# Patient Record
Sex: MALE | Race: WHITE | Employment: PART TIME | URBAN - METROPOLITAN AREA
[De-identification: names, ages, dates, MRNs, and addresses within clinical notes are randomized per-mention and may not be internally consistent; named-entity substitution may affect disease eponyms.]

---

## 2018-10-26 LAB
ALBUMIN SERPL-MCNC: NORMAL G/DL
ALP BLD-CCNC: NORMAL U/L
ALT SERPL-CCNC: NORMAL U/L
ANION GAP SERPL CALCULATED.3IONS-SCNC: NORMAL MMOL/L
AST SERPL-CCNC: NORMAL U/L
BILIRUB SERPL-MCNC: NORMAL MG/DL (ref 0.1–1.4)
BUN BLDV-MCNC: NORMAL MG/DL
CALCIUM SERPL-MCNC: NORMAL MG/DL
CHLORIDE BLD-SCNC: NORMAL MMOL/L
CO2: NORMAL MMOL/L
CREAT SERPL-MCNC: 1 MG/DL
GFR CALCULATED: NORMAL
GLUCOSE BLD-MCNC: NORMAL MG/DL
POTASSIUM SERPL-SCNC: 4.8 MMOL/L
SODIUM BLD-SCNC: NORMAL MMOL/L
TOTAL PROTEIN: NORMAL

## 2018-10-27 LAB
CHOLESTEROL, TOTAL: 221 MG/DL
CHOLESTEROL/HDL RATIO: NORMAL
HDLC SERPL-MCNC: 60 MG/DL (ref 35–70)
LDL CHOLESTEROL CALCULATED: 145 MG/DL (ref 0–160)
TRIGL SERPL-MCNC: 81 MG/DL
VLDLC SERPL CALC-MCNC: 16 MG/DL

## 2019-04-30 LAB — TESTOSTERONE TOTAL: 335 NG/DL (ref 220–1000)

## 2019-05-03 ENCOUNTER — TELEPHONE (OUTPATIENT)
Dept: FAMILY MEDICINE CLINIC | Age: 69
End: 2019-05-03

## 2019-05-03 DIAGNOSIS — E29.1 HYPOGONADISM IN MALE: ICD-10-CM

## 2019-05-03 DIAGNOSIS — E34.9 TESTOSTERONE DEFICIENCY: Primary | ICD-10-CM

## 2019-05-03 RX ORDER — TESTOSTERONE CYPIONATE 200 MG/ML
200 VIAL (ML) INTRAMUSCULAR
Qty: 2 ML | Refills: 1 | OUTPATIENT
Start: 2019-05-03 | End: 2019-06-05

## 2019-05-03 NOTE — TELEPHONE ENCOUNTER
----- Message from Clotilde Lewis MD sent at 5/1/2019 11:22 AM EDT -----  Please call pt-his T level is low nml. We can try T supplementation to see if it helps him feel better. P: If he'd like-would try injectable testosterone cypionate 200 mg IM q 2 weeks and thne recheck T level in 4-6 weeks. Thxs. PATIENT INFORMED OF LAB PER DR. Nicolle Burks.  VOICE UNDERSTANDING. WOULD LIKE TO TRY TESTOSTERONE INJECTION. ADVISED THAT IN SOME CASES, INSURANCE WILL REJECT RX AND IT MAY REQUIRE A PRIOR AUTHORIZATION. THIS NURSE WILL BE WATCHING FOR THIS. IF THEY COVER IT, PATIENT CAN  FROM PHARMACY AND SCHEDULE A NURSE VISIT FOR US TO ADMINISTER. WILL NEED TO PUT IN ORDER FOR LAB DRAW THEN. PATIENT VOICED UNDERSTANDING. CALLED TESTOSTERONE TO [facility] AT 9:48.

## 2019-05-08 DIAGNOSIS — R79.89 LOW TESTOSTERONE: Primary | ICD-10-CM

## 2019-05-08 NOTE — TELEPHONE ENCOUNTER
INFORMED PATIENT THAT A PRIOR AUTH FOR TESTOSTERONE CYPIONATE WAS DONE AND THE REJECTION MESSAGE STATED THAT HE NEEDS A SECOND TESTOSTERONE LEVEL DRAWN. PATIENT INFORMED AND ORDER FOR TESTOSTERONE LEVEL ENTERED FOR NEXT WEEK.

## 2019-05-08 NOTE — TELEPHONE ENCOUNTER
PLEASE SIGN LAB ORDER. PER INSURANCE, REQUIRES SECOND DRAW FOR INSURANCE PURPOSES BEFORE THEY WILL IMPROVE INJECTIONS.

## 2019-05-10 RX ORDER — LISINOPRIL AND HYDROCHLOROTHIAZIDE 20; 12.5 MG/1; MG/1
TABLET ORAL
Qty: 30 TABLET | Refills: 5 | Status: SHIPPED | OUTPATIENT
Start: 2019-05-10 | End: 2019-11-13

## 2019-05-15 ENCOUNTER — OFFICE VISIT (OUTPATIENT)
Dept: FAMILY MEDICINE CLINIC | Age: 69
End: 2019-05-15
Payer: COMMERCIAL

## 2019-05-15 VITALS
HEART RATE: 65 BPM | HEIGHT: 68 IN | TEMPERATURE: 97.8 F | WEIGHT: 170.4 LBS | OXYGEN SATURATION: 97 % | SYSTOLIC BLOOD PRESSURE: 118 MMHG | BODY MASS INDEX: 25.82 KG/M2 | DIASTOLIC BLOOD PRESSURE: 68 MMHG

## 2019-05-15 DIAGNOSIS — L25.8 CONTACT DERMATITIS DUE TO OTHER AGENT, UNSPECIFIED CONTACT DERMATITIS TYPE: Primary | ICD-10-CM

## 2019-05-15 DIAGNOSIS — E29.1 TESTICULAR HYPOFUNCTION: ICD-10-CM

## 2019-05-15 DIAGNOSIS — I10 ESSENTIAL HYPERTENSION: ICD-10-CM

## 2019-05-15 DIAGNOSIS — E29.1 HYPOGONADISM IN MALE: ICD-10-CM

## 2019-05-15 DIAGNOSIS — R79.89 LOW TESTOSTERONE: ICD-10-CM

## 2019-05-15 DIAGNOSIS — E29.1 TESTICULAR HYPOFUNCTION: Primary | ICD-10-CM

## 2019-05-15 DIAGNOSIS — E34.9 TESTOSTERONE DEFICIENCY: ICD-10-CM

## 2019-05-15 PROCEDURE — 99213 OFFICE O/P EST LOW 20 MIN: CPT | Performed by: FAMILY MEDICINE

## 2019-05-15 RX ORDER — LISINOPRIL 30 MG/1
30 TABLET ORAL DAILY
Qty: 30 TABLET | Refills: 0 | Status: SHIPPED | OUTPATIENT
Start: 2019-05-15 | End: 2019-06-07 | Stop reason: SDUPTHER

## 2019-05-15 RX ORDER — PREDNISONE 1 MG/1
TABLET ORAL
Qty: 15 TABLET | Refills: 0 | Status: SHIPPED | OUTPATIENT
Start: 2019-05-15 | End: 2019-05-31

## 2019-05-15 RX ORDER — LISINOPRIL AND HYDROCHLOROTHIAZIDE 20; 12.5 MG/1; MG/1
TABLET ORAL
Refills: 5 | COMMUNITY
Start: 2019-05-10 | End: 2019-05-15 | Stop reason: SINTOL

## 2019-05-15 ASSESSMENT — PATIENT HEALTH QUESTIONNAIRE - PHQ9
SUM OF ALL RESPONSES TO PHQ QUESTIONS 1-9: 0
2. FEELING DOWN, DEPRESSED OR HOPELESS: 0
SUM OF ALL RESPONSES TO PHQ9 QUESTIONS 1 & 2: 0
1. LITTLE INTEREST OR PLEASURE IN DOING THINGS: 0
SUM OF ALL RESPONSES TO PHQ QUESTIONS 1-9: 0

## 2019-05-15 ASSESSMENT — ENCOUNTER SYMPTOMS
COUGH: 0
SHORTNESS OF BREATH: 0

## 2019-05-15 NOTE — PROGRESS NOTES
5/15/2019     Audrey [place] Bobbi Daley is a 70 y.o. male who presents today with:  Chief Complaint   Patient presents with    Other     rash bilat lower extremeties x 2 weeks , red raised and itching, pt has tried gold bond powder which did help with the itching. no med refills per pt current pharm [facility]    . /68 (Position: Sitting, Cuff Size: Medium Adult)   Pulse 65   Temp 97.8 °F (36.6 °C) (Temporal)   Ht 5' 7.5\" (1.715 m)   Wt 170 lb 6.4 oz (77.3 kg)   SpO2 97%   BMI 26.29 kg/m²     HPI  History was obtained from the pt. he reports that he developed a rash about 3 weeks ago right after he had been in the office to see Dr. Bala Hoskins for routine follow-up. He states that the rash involved in her thighs of both legs, and his lower extremities. Reports that the rash both itches and burns at different times. Has used hydrocortisone cream and states that it made the burning worse. He has applied Gold-bond eczema powder works best to help alleviate his symptoms. He states that the rash is nowhere else on his body including his trunk and bilateral upper extremities chest and face. He denies use of any new soaps, lotions, detergents or creams. Did have his medication change rather than his previous office visit and was started on hydrochlorothiazide. Did speak with Dr. Jazz Glasgow M.D. about this patient and he believes that the patient may have developed photosensitivity to be due to the hydrochlorothiazide. REVIEW OF SYMPTOMS    Review of Systems   Constitutional: Negative for fatigue. Respiratory: Negative for cough and shortness of breath. Cardiovascular: Negative for chest pain, palpitations and leg swelling. Skin: Positive for rash (BLE - inner thighs). Neurological: Negative for dizziness, light-headedness and headaches. PAST MEDICAL HISTORY  No past medical history on file. FAMILY HISTORY  No family history on file.     SOCIAL HISTORY  Social History Socioeconomic History    Marital status:      Spouse name: None    Number of children: None    Years of education: None    Highest education level: None   Occupational History    None   Social Needs    Financial resource strain: None    Food insecurity:     Worry: None     Inability: None    Transportation needs:     Medical: None     Non-medical: None   Tobacco Use    Smoking status: Current Every Day Smoker     Packs/day: 0.50     Years: 15.00     Pack years: 7.50     Types: Cigars     Start date: 5/15/2005    Smokeless tobacco: Never Used   Substance and Sexual Activity    Alcohol use: Yes    Drug use: No    Sexual activity: None   Lifestyle    Physical activity:     Days per week: None     Minutes per session: None    Stress: None   Relationships    Social connections:     Talks on phone: None     Gets together: None     Attends Mu-ism service: None     Active member of club or organization: None     Attends meetings of clubs or organizations: None     Relationship status: None    Intimate partner violence:     Fear of current or ex partner: None     Emotionally abused: None     Physically abused: None     Forced sexual activity: None   Other Topics Concern    None   Social History Narrative    None        SURGICAL HISTORY  No past surgical history on file. CURRENT MEDICATIONS  Current Outpatient Medications   Medication Sig Dispense Refill    lisinopril (PRINIVIL;ZESTRIL) 30 MG tablet Take 1 tablet by mouth daily 30 tablet 0    predniSONE (DELTASONE) 5 MG tablet Take 5 tabs x1 day, then 4 tabs x 1 day, then 3 tabs x 1 day, then 2 tabs x 1 day, then 1 tab x 1 day 15 tablet 0    Testosterone Cypionate 200 MG/ML SOLN Inject 200 mg as directed every 14 days 2 mL 1     No current facility-administered medications for this visit. ALLERGIES  No Known Allergies    PHYSICAL EXAM    Physical Exam   Constitutional: He is oriented to person, place, and time.  He appears current POC for the assessment and plan dictated above. Electronically signed by TED Merida on 5/15/2019      Please note that this chart was generated using dragon dictation software. Although every effort was made to ensure the accuracy of this automated transcription, some errors in transcription may have occurred.

## 2019-05-15 NOTE — PATIENT INSTRUCTIONS
Take medications as prescribed. Stop taking lisinopril/hydrochlorothiazide and take only the lisinopril 30 mg, 1 tablet by mouth daily. Take the prednisone taper as prescribed. Contact this office should you have worsening of symptoms, or your symptoms do not resolve. Follow up in 2 weeks for rash and a blood pressure check. May use Lubriderm, Cera-Ve, or other lotions to keep skin moisturized. Patient Education        Oral Corticosteroids: Care Instructions  Your Care Instructions    Oral corticosteroids are commonly used medicines. They help calm down the body's response to inflammation. Oral means that they are taken by mouth. This is most often in the form of a pill. They are used for treating many conditions. You may take them for asthma, COPD, back pain, or allergic reactions. They are also used for other conditions such as autoimmune diseases and certain types of cancer. You may have side effects from taking this medicine. These include nausea, headache, dizziness, and anxiety. Pregnant women should not take this medicine unless their doctor tells them to. Follow your doctor's instructions on how to take this medicine. If you are taking it for 2 weeks or more, your doctor may give you special instructions to slowly reduce (taper) the amount you take. Slowly cutting down on the medicine over time helps your body adjust to the change. Follow-up care is a key part of your treatment and safety. Be sure to make and go to all appointments, and call your doctor if you are having problems. It's also a good idea to know your test results and keep a list of the medicines you take. How can you care for yourself at home? · Be safe with medicines. Take your medicines exactly as prescribed. Call your doctor if you think you are having a problem with your medicine. You will get more details on the specific medicines your doctor prescribes.   · Take your medicine after a meal. It may cause nausea if you take it on an empty stomach. · Avoid taking nonsteroidal anti-inflammatory drugs (NSAIDs) while you are taking oral corticosteroids. Taking both of these medicines might cause an upset stomach. NSAIDs include ibuprofen (Advil, Motrin) and naproxen (Aleve). · If you have a history of stomach ulcers, you may want to avoid taking this medicine and an NSAID at the same time. This can cause stomach upset or bleeding. · Follow your doctor's instructions for how to stop taking this medicine. You may need to taper it. This means the medicine should be slowly reduced. Do not stop taking the medicine all at once. When should you call for help? Call 911 if:    · You vomit blood or what looks like coffee grounds.    Call your doctor now or seek immediate medical care if:    · Your symptoms are getting worse.     · You are dizzy or lightheaded, or you feel like you may faint.     · You have new or worse nausea or vomiting.     · You have stomach pain that is getting worse.     · Your stools are black.    Watch closely for changes in your health, and be sure to contact your doctor if:    · You do not get better as expected. Where can you learn more? Go to https://Fifty100.Simple-Fill. org and sign in to your [employer] account. Enter C708 in the PolicyBazaar box to learn more about \"Oral Corticosteroids: Care Instructions. \"     If you do not have an account, please click on the \"Sign Up Now\" link. Current as of: September 5, 2018  Content Version: 12.0  © 1510-8173 Health[employer], [employer]orporated. Care instructions adapted under license by [facility] ([facility]). If you have questions about a medical condition or this instruction, always ask your healthcare professional. [place] [address] any warranty or liability for your use of this information.          Patient Education        prednisone  Pronunciation:  PRED mirta duke  Brand:  Steve, Sterapred, Sterapred 12 DAY, Sterapred DS, Sterapred DS 12 DAY  What is the most important information I should know about prednisone? Prednisone treats many different conditions such as allergic disorders, skin conditions, ulcerative colitis, arthritis, lupus, psoriasis, or breathing disorders. You should not take prednisone if you have a fungal infection anywhere in your body. Steroid medication can weaken your immune system, making it easier for you to get an infection. Avoid being near people who are sick or have infections. Do not receive a \"live\" vaccine while using prednisone. Call your doctor at once if you have shortness of breath, severe pain in your upper stomach, bloody or tarry stools, severe depression, changes in personality or behavior, vision problems, or eye pain. You should not stop using prednisone suddenly. Follow your doctor's instructions about tapering your dose. What is prednisone? Prednisone is a steroid. Prednisone prevents the release of substances in the body that cause inflammation. Prednisone also suppresses the immune system. Prednisone is used as an anti-inflammatory or an immunosuppressant medication. Prednisone treats many different conditions such as allergic disorders, skin conditions, ulcerative colitis, arthritis, lupus, psoriasis, or breathing disorders. Prednisone may also be used for purposes not listed in this medication guide. What should I discuss with my healthcare provider before taking prednisone? You should not use this medication if you are allergic to prednisone, or if you have a fungal infection anywhere in your body. Steroid medication can weaken your immune system, making it easier for you to get an infection or worsening an infection you already have or have recently had. Tell your doctor about any illness or infection you have had within the past several weeks.   To make sure prednisone is safe for you, tell your doctor if you have:  · any illness that causes diarrhea;  · liver disease (such as cirrhosis);  · kidney disease;  · heart disease, high blood pressure, low levels of potassium in your blood;  · a thyroid disorder;  · diabetes;  · a history of malaria;  · tuberculosis;  · osteoporosis;  · glaucoma, cataracts, or herpes infection of the eyes;  · stomach ulcers, ulcerative colitis, or a history of stomach bleeding;  · a muscle disorder such as myasthenia gravis; or  · depression or mental illness. Long-term use of steroids may lead to bone loss (osteoporosis), especially if you smoke, if you do not exercise, if you do not get enough vitamin D or calcium in your diet, or if you have a family history of osteoporosis. Talk with your doctor about your risk of osteoporosis. Prednisone can cause low birth weight or birth defects if you take the medicine during your first trimester. Tell your doctor if you are pregnant or plan to become pregnant while using this medication. Use effective birth control. Prednisone can pass into breast milk and may harm a nursing baby. Tell your doctor if you are breast-feeding a baby. Steroids can affect growth in children. Talk with your doctor if you think your child is not growing at a normal rate while using this medication. How should I take prednisone? Follow all directions on your prescription label. Your doctor may occasionally change your dose to make sure you get the best results. Do not take this medicine in larger or smaller amounts or for longer than recommended. Take with food. Your dosage needs may change if you have any unusual stress such as a serious illness, fever or infection, or if you have surgery or a medical emergency. Do not change your medication dose or schedule without your doctor's advice. Measure liquid medicine with a special dose-measuring spoon or medicine cup. If you do not have a dose-measuring device, ask your pharmacist for one. Do not crush, chew, or break a delayed-release tablet. Swallow it whole.   While using prednisone, you may need frequent blood tests at your doctor's office. Your blood pressure may also need to be checked. This medication can cause unusual results with certain medical tests. Tell any doctor who treats you that you are using prednisone. You should not stop using prednisone suddenly. Follow your doctor's instructions about tapering your dose. Wear a medical alert tag or carry an ID card stating that you take prednisone. Any medical care provider who treats you should know that you are using a steroid. Store at room temperature away from moisture and heat. What happens if I miss a dose? Take the missed dose as soon as you remember. Skip the missed dose if it is almost time for your next scheduled dose. Do not take extra medicine to make up the missed dose. What happens if I overdose? Seek emergency medical attention or call the Poison Help line at 1-727.994.4580. An overdose of prednisone is not expected to produce life threatening symptoms. However, long term use of high steroid doses can lead to symptoms such as thinning skin, easy bruising, changes in the shape or location of body fat (especially in your face, neck, back, and waist), increased acne or facial hair, menstrual problems, impotence, or loss of interest in sex. What should I avoid while taking prednisone? Avoid being near people who are sick or have infections. Call your doctor for preventive treatment if you are exposed to chicken pox or measles. These conditions can be serious or even fatal in people who are using a steroid. Do not receive a \"live\" vaccine while using prednisone. Prednisone may increase your risk of harmful effects from a live vaccine. Live vaccines include measles, mumps, rubella (MMR), rotavirus, typhoid, yellow fever, varicella (chickenpox), zoster (shingles), and nasal flu (influenza) vaccine. Avoid drinking alcohol while you are taking prednisone. What are the possible side effects of prednisone?   Get emergency medical help if you have any of these signs of an allergic reaction: hives; difficult breathing; swelling of your face, lips, tongue, or throat. Call your doctor at once if you have:  · blurred vision, eye pain, or seeing halos around lights;  · swelling, rapid weight gain, feeling short of breath;  · severe depression, feelings of extreme happiness or sadness, changes in personality or behavior, seizure (convulsions);  · bloody or tarry stools, coughing up blood;  · pancreatitis (severe pain in your upper stomach spreading to your back, nausea and vomiting, fast heart rate);  · low potassium (confusion, uneven heart rate, extreme thirst, increased urination, leg discomfort, muscle weakness or limp feeling); or  · dangerously high blood pressure (severe headache, blurred vision, buzzing in your ears, anxiety, confusion, chest pain, shortness of breath, uneven heartbeats, seizure). Other common side effects may include:  · sleep problems (insomnia), mood changes;  · increased appetite, gradual weight gain;  · acne, increased sweating, dry skin, thinning skin, bruising or discoloration;  · slow wound healing;  · headache, dizziness, spinning sensation;  · nausea, stomach pain, bloating; or  · changes in the shape or location of body fat (especially in your arms, legs, face, neck, breasts, and waist). This is not a complete list of side effects and others may occur. Call your doctor for medical advice about side effects. You may report side effects to FDA at 3-865-FDA-8559. What other drugs will affect prednisone? Many drugs can interact with prednisone. Not all possible interactions are listed here.  Tell your doctor about all your medications and any you start or stop using during treatment with prednisone, especially:  · amphotericin B;  · cyclosporine;  · digoxin, digitalis;  · [place]'s wort;  · an antibiotic such as clarithromycin or telithromycin;  · antifungal medication such as itraconazole, ketoconazole, posaconazole, voriconazole;  · birth control pills and other hormones;  · a blood thinner such as warfarin, Coumadin;  · a diuretic or \"water pill\";  · the hepatitis C medications boceprevir or telaprevir;  · HIV or AIDS medicine such as atazanavir, delavirdine, efavirenz, fosamprenavir, indinavir, nelfinavir, nevirapine, ritonavir, saquinavir;  · insulin or diabetes medications you take by mouth;  · a non-steroidal anti-inflammatory drug (NSAID) such as aspirin, ibuprofen (Advil, Motrin), naproxen (Aleve), celecoxib, diclofenac, indomethacin, meloxicam, and others;  · seizure medications such as carbamazepine, fosphenytoin, oxcarbazepine, phenobarbital, phenytoin, primidone; or  · the tuberculosis medications isoniazid, rifabutin, rifapentine, or rifampin. This list is not complete and many other drugs can interact with prednisone. This includes prescription and over-the-counter medicines, vitamins, and herbal products. Give a list of all your medicines to any healthcare provider who treats you. Where can I get more information? Your pharmacist can provide more information about prednisone. Remember, keep this and all other medicines out of the reach of children, never share your medicines with others, and use this medication only for the indication prescribed. Every effort has been made to ensure that the information provided by [address]iana Cordon [address] is accurate, up-to-date, and complete, but no guarantee is made to that effect. Drug information contained herein may be time sensitive. [facility] information has been compiled for use by healthcare practitioners and consumers in the [place] and therefore [facility] does not warrant that uses outside of the [place] are appropriate, unless specifically indicated otherwise. [facility]'s drug information does not endorse drugs, diagnose patients or recommend therapy.  [facility]'s drug information is an informational resource designed to assist licensed healthcare practitioners in caring for their patients and/or to serve consumers viewing this service as a supplement to, and not a substitute for, the expertise, skill, knowledge and judgment of healthcare practitioners. The absence of a warning for a given drug or drug combination in no way should be construed to indicate that the drug or drug combination is safe, effective or appropriate for any given patient. [facility] does not assume any responsibility for any aspect of healthcare administered with the aid of information [facility] provides. The information contained herein is not intended to cover all possible uses, directions, precautions, warnings, drug interactions, allergic reactions, or adverse effects. If you have questions about the drugs you are taking, check with your doctor, nurse or pharmacist.  Copyright 0104-4822 [address]. Version: 9.01. Revision date: 2/13/2013. Care instructions adapted under license by [facility] ([facility]). If you have questions about a medical condition or this instruction, always ask your healthcare professional. [place] [address] any warranty or liability for your use of this information. Patient Education        Rash: Care Instructions  Your Care Instructions  A rash is any irritation or inflammation of the skin. Rashes have many possible causes, including allergy, infection, illness, heat, and emotional stress. Follow-up care is a key part of your treatment and safety. Be sure to make and go to all appointments, and call your doctor if you are having problems. It's also a good idea to know your test results and keep a list of the medicines you take. How can you care for yourself at home? · Wash the area with water only. Soap can make dryness and itching worse. Pat dry. · Put cold, wet cloths on the rash to reduce itching. · Keep cool, and stay out of the sun. · Leave the rash open to the air as much of the time as possible.   · Sometimes petroleum jelly Aquaphor, [place], [facility], Bag Balm, Blistex Lip [place], Carmex, CeraVe, [employer], [employer], [employer], [employer], Eucerin, [employer], Peter Kiewit [facility], [facility], [facility], [place], Moisturel, [employer], Neutrogena [place], [place], Chau, S[place]tuc, Phisoderm, Pretty Feet & Hands, Replens, Soft Sense, [place], Vaseline Intensive Care  What is the most important information I should know about topical emollients? Follow all directions on your medicine label and package. Tell each of your healthcare providers about all your medical conditions, allergies, and all medicines you use. What are topical emollients? Emollients are substances that moisten and soften your skin. Topical (for the skin) emollients are used to treat or prevent dry skin. Topical emollients are sometimes contained in products that also treat acne, chapped lips, diaper rash, cold sores, or other minor skin irritation. There are many brands and forms of topical emollients available and not all are listed on this leaflet. Topical emollients may also be used for purposes not listed in this medication guide. What should I discuss with my healthcare provider before using topical emollients? You should not use a topical emollient if you are allergic to it. Topical emollients will not treat or prevent a skin infection. Ask a doctor or pharmacist if it is safe for you to use this medicine if you have:  · deep wounds or open sores;  · swelling, warmth, redness, oozing, or bleeding;  · large areas of skin irritation;  · any type of allergy; or  · if you are pregnant or breast-feeding. How should I use topical emollients? Use exactly as directed on the label, or as prescribed by your doctor. Do not use in larger or smaller amounts or for longer than recommended. Clean the skin where you will apply the topical emollient. It may help to apply this product when your skin is wet or damp.  Follow directions on the product label. Shake the product container if recommended on the label. Apply a small amount of topical emollient to the affected area and rub in gently. If you are using a stick, pad, or soap form of topical emollient, follow directions for use on the product label. Do not use this product over large area of skin. Do not apply a topical emollient to a deep puncture wound or severe burn without medical advice. If your skin appears white or gray and feels soggy, you may be applying too much topical emollient or using it too often. Some forms of topical emollient may be flammable and should not be used near high heat or open flame, or applied while you are smoking. Store as directed away from moisture, heat, and light. Keep the bottle, tube, or other container tightly closed when not in use. What happens if I miss a dose? Since this product is used as needed, it does not have a daily dosing schedule. Seek medical advice if your condition does not improve after using a topical emollient. What happens if I overdose? Seek emergency medical attention or call the Poison Help line at 1-494.890.3214. What should I avoid while taking topical emollients? Avoid getting topical emollients in your eyes, nose, or mouth. If this does happen, rinse with water. Avoid exposure to sunlight or tanning beds. Some topical emollients can make your skin more sensitive to sunlight or UV rays. What are the possible side effects of topical emollients? Get emergency medical help if you have any of these signs of an allergic reaction: hives; difficult breathing; swelling of your face, lips, tongue, or throat. Stop using the topical emollient and call your doctor if you have severe burning, stinging, redness, or irritation where the product was applied. Less serious side effects are more likely, and you may have none at all. This is not a complete list of side effects and others may occur.  Call your doctor for medical advice about side effects. You may report side effects to FDA at 0-976-FDA-0307. What other drugs will affect topical emollients? It is not likely that other drugs you take orally or inject will have an effect on topically applied emollients. But many drugs can interact with each other. Tell each of your health care providers about all medicines you use, including prescription and over-the-counter medicines, vitamins, and herbal products. Where can I get more information? Your pharmacist can provide more information about topical emollients. Remember, keep this and all other medicines out of the reach of children, never share your medicines with others, and use this medication only for the indication prescribed. Every effort has been made to ensure that the information provided by Fabiana Cordon Dr is accurate, up-to-date, and complete, but no guarantee is made to that effect. Drug information contained herein may be time sensitive. [facility] information has been compiled for use by healthcare practitioners and consumers in the [place] and therefore [facility] does not warrant that uses outside of the [place] are appropriate, unless specifically indicated otherwise. [facility]'s drug information does not endorse drugs, diagnose patients or recommend therapy. [facility] drug information is an informational resource designed to assist licensed healthcare practitioners in caring for their patients and/or to serve consumers viewing this service as a supplement to, and not a substitute for, the expertise, skill, knowledge and judgment of healthcare practitioners. The absence of a warning for a given drug or drug combination in no way should be construed to indicate that the drug or drug combination is safe, effective or appropriate for any given patient. [facility] does not assume any responsibility for any aspect of healthcare administered with the aid of information [facility] provides.  The information contained herein is not intended to cover all possible uses, directions, precautions, warnings, drug interactions, allergic reactions, or adverse effects. If you have questions about the drugs you are taking, check with your doctor, nurse or pharmacist.  Copyright 2506-1781 [address]: 4.02. Revision date: 12/10/2013. Care instructions adapted under license by [facility] ([facility]). If you have questions about a medical condition or this instruction, always ask your healthcare professional. [place] [address] any warranty or liability for your use of this information.

## 2019-05-17 LAB — TESTOSTERONE TOTAL: 330 NG/DL (ref 220–1000)

## 2019-05-17 RX ORDER — LANOLIN ALCOHOL/MO/W.PET/CERES
1000 CREAM (GRAM) TOPICAL DAILY
COMMUNITY

## 2019-05-17 RX ORDER — LORAZEPAM 0.5 MG
1200 TABLET ORAL DAILY
COMMUNITY
End: 2019-11-13

## 2019-05-17 RX ORDER — LORATADINE 10 MG/1
10 TABLET ORAL DAILY
COMMUNITY
End: 2019-05-31

## 2019-05-31 ENCOUNTER — OFFICE VISIT (OUTPATIENT)
Dept: FAMILY MEDICINE CLINIC | Age: 69
End: 2019-05-31
Payer: COMMERCIAL

## 2019-05-31 VITALS
SYSTOLIC BLOOD PRESSURE: 140 MMHG | DIASTOLIC BLOOD PRESSURE: 68 MMHG | OXYGEN SATURATION: 98 % | HEART RATE: 71 BPM | WEIGHT: 172.5 LBS | BODY MASS INDEX: 26.14 KG/M2 | HEIGHT: 68 IN

## 2019-05-31 DIAGNOSIS — L30.9 DERMATITIS: Primary | ICD-10-CM

## 2019-05-31 PROCEDURE — 99213 OFFICE O/P EST LOW 20 MIN: CPT | Performed by: PHYSICIAN ASSISTANT

## 2019-05-31 RX ORDER — PREDNISONE 10 MG/1
TABLET ORAL
Qty: 30 TABLET | Refills: 0 | Status: SHIPPED | OUTPATIENT
Start: 2019-05-31 | End: 2019-06-05

## 2019-05-31 RX ORDER — CETIRIZINE HYDROCHLORIDE 10 MG/1
10 TABLET ORAL DAILY
Refills: 0 | COMMUNITY
Start: 2019-05-31 | End: 2019-06-30

## 2019-05-31 NOTE — PROGRESS NOTES
5/31/2019    [facility]. Chief Complaint   Patient presents with    Follow-up     rash still itchy, and has bruising on left arm , has used otc allergy medication with little help        HPI  History was obtained from the patient. Handy Woodall is a 78 y.o. male who presents today with complaints of ongoing itchy rash to bilateral extremities. He was placed on the 20 mg prednisone taper this helped but the rash returned quickly and to the extent that it was before. It is to his lower legs only. He states it is above the ankle and below the groin. It is red and itchy it is not draining. Patient has tried multiple over-the-counter creams without improvement of his symptoms. Patient otherwise with no fever and drainage no other rash anywhere else. No new detergents or new contacts  Follow-up of hypertension. Patient is tolerating the blood pressure medication change. The hydrochlorothiazide was removed at the last visit due to the possibility of photosensitivity. REVIEW OF SYMPTOMS    Constitutional:  Denies fever, chills, weight loss or weakness  Eyes:  Denies photophobia or discharge  ENT:  Denies sore throat or ear pain  Cardiovascular:  Denies chest pain, palpitations or swelling  Respiratory:  Denies cough or shortness of breath  GI:  Denies abdominal pain, nausea, vomiting, or diarrhea  Skin:   Rash as above  Neurologic:  Denies headache, focal weakness, or sensory changes    All symptoms negative except as marked. PAST MEDICAL HISTORY  History reviewed. No pertinent past medical history.     FAMILY HISTORY  Family History   Problem Relation Age of Onset    Dementia Mother     Heart Failure Father        SOCIAL HISTORY  Social History     Socioeconomic History    Marital status:      Spouse name: None    Number of children: 2    Years of education: None    Highest education level: None   Occupational History     Comment: Retired   Social Needs    Financial resource strain: None  Food insecurity:     Worry: None     Inability: None    Transportation needs:     Medical: None     Non-medical: None   Tobacco Use    Smoking status: Current Every Day Smoker     Packs/day: 0.50     Years: 15.00     Pack years: 7.50     Types: Cigars     Start date: 5/15/2005    Smokeless tobacco: Never Used   Substance and Sexual Activity    Alcohol use: Yes    Drug use: No    Sexual activity: None   Lifestyle    Physical activity:     Days per week: None     Minutes per session: None    Stress: None   Relationships    Social connections:     Talks on phone: None     Gets together: None     Attends Confucianism service: None     Active member of club or organization: None     Attends meetings of clubs or organizations: None     Relationship status: None    Intimate partner violence:     Fear of current or ex partner: None     Emotionally abused: None     Physically abused: None     Forced sexual activity: None   Other Topics Concern    None   Social History Narrative    None        SURGICAL HISTORY  History reviewed. No pertinent surgical history. CURRENT MEDICATIONS  Current Outpatient Medications   Medication Sig Dispense Refill    predniSONE (DELTASONE) 10 MG tablet 4qd,3qd,2qd,1qd each dose x 3 days 30 tablet 0    cetirizine (ZYRTEC) 10 MG tablet Take 1 tablet by mouth daily  0    vitamin B-12 (CYANOCOBALAMIN) 1000 MCG tablet Take 1,000 mcg by mouth daily      Testosterone Cypionate 200 MG/ML SOLN Inject 200 mg as directed every 14 days 2 mL 1    loratadine (CLARITIN) 10 MG tablet Take 10 mg by mouth daily      Omega-3-6-9 CAPS Take 1,200 mg by mouth daily      lisinopril (PRINIVIL;ZESTRIL) 30 MG tablet Take 1 tablet by mouth daily 30 tablet 0     No current facility-administered medications for this visit.         ALLERGIES  No Known Allergies    PHYSICAL EXAM    BP (!) 140/68   Pulse 71   Ht 5' 7.5\" (1.715 m)   Wt 172 lb 8 oz (78.2 kg)   SpO2 98%   BMI 26.62 kg/m² Constitutional:  Well developed, well nourished  HENT:  Normocephalic, atraumatic, bilateral external ears normal, oropharynx moist, nose normal  Eyes:  PERRLA, EOMI, conjunctiva normal, no discharge, no scleral icterus  Neck:  Normal range of motion, no tenderness, supple, no thyromegaly no carotid bruits noted  Lymphatic:  No lymphadenopathy noted  Cardiovascular:  Normal heart rate, normal rhythm, no murmurs, gallops or rubs  Thorax & Lungs:  Normal breath sounds, no respiratory distress, no wheezing  Abdomen:  Soft, no tenderness, no masses, no pulsatile masses, not distended, bowel sounds normal  Skin:  Warm, dry,  Erythema which is blanchable to bilateral lower extremity starting below the groin to just above the ankle. No fever no drainage no streaking  Extremities:  No edema, no tenderness, no cyanosis, no clubbing 1+ DP pulses  Neurologic:  Alert & oriented X 3, normal motor function, normal sensory function, no focal deficits noted  Psychiatric:  Affect normal, mood normal    ASSESSMENT & PLAN    Zachariah Hannon was seen today for follow-up. Diagnoses and all orders for this visit:    Dermatitis  -     cetirizine (ZYRTEC) 10 MG tablet; Take 1 tablet by mouth daily    Other orders  -     predniSONE (DELTASONE) 10 MG tablet; 4qd,3qd,2qd,1qd each dose x 3 days         Medications Discontinued During This Encounter   Medication Reason    predniSONE (DELTASONE) 5 MG tablet LIST CLEANUP        Return in about 5 days (around 6/5/2019). Stop the Claritin. He will try Zyrtec 10 mg by mouth every evening. We will also place him on prednisone taper 40 mg, 30 mg, 20 mg, 10 mg, each dose x 3 days. Possible side effects of medications include but are not limited to bruising bleeding GI upset high sugars palpitations anxiety and fatigue. Patient verbalizes understanding and wishes to continue. Patient will follow-up in the office in 5 days. He will call the ER with worsening symptoms.   Patient verbalizes understanding of the above plan and is in agreement. Plan of care reviewed with patient who verbalizes understanding and wishes to continue. Patient verbalizes understanding with the above plan and is in agreement. Patient will call with  worsening of symptoms, questions or concerns. Please note that this chart was generated using dragon dictation software. Although every effort was made to ensure the accuracy of this automated transcription, some errors in transcription may have occurred.     Electronically signed by Vivienne Fermin PA-C on 5/31/2019

## 2019-06-05 ENCOUNTER — OFFICE VISIT (OUTPATIENT)
Dept: FAMILY MEDICINE CLINIC | Age: 69
End: 2019-06-05
Payer: COMMERCIAL

## 2019-06-05 VITALS
HEART RATE: 61 BPM | HEIGHT: 68 IN | WEIGHT: 171.4 LBS | BODY MASS INDEX: 25.98 KG/M2 | DIASTOLIC BLOOD PRESSURE: 74 MMHG | SYSTOLIC BLOOD PRESSURE: 136 MMHG | OXYGEN SATURATION: 97 %

## 2019-06-05 DIAGNOSIS — L30.9 DERMATITIS: Primary | ICD-10-CM

## 2019-06-05 DIAGNOSIS — J30.9 ALLERGIC RHINITIS, UNSPECIFIED SEASONALITY, UNSPECIFIED TRIGGER: ICD-10-CM

## 2019-06-05 DIAGNOSIS — E78.2 MIXED HYPERLIPIDEMIA: ICD-10-CM

## 2019-06-05 DIAGNOSIS — I10 ESSENTIAL HYPERTENSION: ICD-10-CM

## 2019-06-05 PROCEDURE — 99213 OFFICE O/P EST LOW 20 MIN: CPT | Performed by: PHYSICIAN ASSISTANT

## 2019-06-05 NOTE — PROGRESS NOTES
6/5/2019    [facility]. Chief Complaint   Patient presents with    Follow-up    Other     has had second lab done for low t need to see if insurance will cover injections       HPI  History was obtained from the patient. Yony Clarke is a 78 y.o. male who presents today for follow-up of dermatitis bilateral lower extremities. Patient states it is over 75% better. It barely itches just in the evening. He is tolerating the steroids without problems. Follow-up of testosterone levels. He has it has had it drawn twice and both levels were on the low side of normal range. Patient states he has been staying active. REVIEW OF SYMPTOMS    Constitutional:  Denies fever, chills, weight loss or weakness  Cardiovascular:  Denies chest pain, palpitations or swelling  Respiratory:  Denies cough or shortness of breath  GI:  Denies abdominal pain, nausea, vomiting, or diarrhea  Skin:  Red rash bilateral lower extremities are normal  Neurologic:  Denies headache, focal weakness, or sensory changes  Lymphatic:  Denies swollen glands    All symptoms negative except as marked. PAST MEDICAL HISTORY  History reviewed. No pertinent past medical history.     FAMILY HISTORY  Family History   Problem Relation Age of Onset    Dementia Mother     Heart Failure Father        SOCIAL HISTORY  Social History     Socioeconomic History    Marital status:      Spouse name: None    Number of children: 2    Years of education: None    Highest education level: None   Occupational History     Comment: Retired   Social Needs    Financial resource strain: None    Food insecurity:     Worry: None     Inability: None    Transportation needs:     Medical: None     Non-medical: None   Tobacco Use    Smoking status: Current Every Day Smoker     Packs/day: 0.50     Years: 15.00     Pack years: 7.50     Types: Cigars     Start date: 5/15/2005    Smokeless tobacco: Never Used   Substance and Sexual Activity    Alcohol use: Yes    Drug use: No    Sexual activity: None   Lifestyle    Physical activity:     Days per week: None     Minutes per session: None    Stress: None   Relationships    Social connections:     Talks on phone: None     Gets together: None     Attends Restorationist service: None     Active member of club or organization: None     Attends meetings of clubs or organizations: None     Relationship status: None    Intimate partner violence:     Fear of current or ex partner: None     Emotionally abused: None     Physically abused: None     Forced sexual activity: None   Other Topics Concern    None   Social History Narrative    None        SURGICAL HISTORY  History reviewed. No pertinent surgical history. CURRENT MEDICATIONS  Current Outpatient Medications   Medication Sig Dispense Refill    cetirizine (ZYRTEC) 10 MG tablet Take 1 tablet by mouth daily  0    Omega-3-6-9 CAPS Take 1,200 mg by mouth daily      vitamin B-12 (CYANOCOBALAMIN) 1000 MCG tablet Take 1,000 mcg by mouth daily      lisinopril (PRINIVIL;ZESTRIL) 30 MG tablet Take 1 tablet by mouth daily 30 tablet 0    Testosterone Cypionate 200 MG/ML SOLN Inject 200 mg as directed every 14 days 2 mL 1     No current facility-administered medications for this visit.         ALLERGIES  No Known Allergies    PHYSICAL EXAM    /74   Pulse 61   Ht 5' 7.5\" (1.715 m)   Wt 171 lb 6.4 oz (77.7 kg)   SpO2 97%   BMI 26.45 kg/m²     Constitutional:  Well developed, well nourished  HENT:  Normocephalic, atraumatic, bilateral external ears normal, oropharynx moist, nose normal  Eyes:  PERRLA, EOMI, conjunctiva normal, no discharge, no scleral icterus  Cardiovascular:  Normal heart rate, normal rhythm, no murmurs, gallops or rubs  Thorax & Lungs:  Normal breath sounds, no respiratory distress, no wheezing  Skin:  Warm, dry, red rash bilateral lower extremities is 75-85% better  Back:  No tenderness, No CVA tenderness  Extremities:  No edema, no tenderness, no cyanosis, no clubbing  Musculoskeletal:  Good range of motion  all major joints  Neurologic:  Alert & oriented X 3, normal motor function, normal sensory function, no focal deficits noted  Psychiatric:  Affect normal, mood normal    ASSESSMENT & PLAN    Jm Israel was seen today for follow-up and other. Diagnoses and all orders for this visit:    Dermatitis         Medications Discontinued During This Encounter   Medication Reason    predniSONE (DELTASONE) 10 MG tablet LIST CLEANUP        Return for as scheduled. Patient will continue on the steroids as he currently is doing. He is going to start the 20 mg daily tomorrow. He will call Monday if the rash remains and we will restart the prednisone taper. Patient's testosterone levels were both within the normal limits although they were on the low end of normal.  Patient states his insurance would not cover the medicine. Patient verbalizes understanding of the above plan and is in agreement and will call if questions or concerns    Plan of care reviewed with patient who verbalizes understanding and wishes to continue. Patient verbalizes understanding with the above plan and is in agreement. Patient will call with  worsening of symptoms, questions or concerns. Please note that this chart was generated using dragon dictation software. Although every effort was made to ensure the accuracy of this automated transcription, some errors in transcription may have occurred.     Electronically signed by Radha Castillo PA-C on 6/5/2019

## 2019-06-17 ENCOUNTER — TELEPHONE (OUTPATIENT)
Dept: FAMILY MEDICINE CLINIC | Age: 69
End: 2019-06-17

## 2019-06-17 DIAGNOSIS — L25.8 CONTACT DERMATITIS DUE TO OTHER AGENT, UNSPECIFIED CONTACT DERMATITIS TYPE: Primary | ICD-10-CM

## 2019-06-17 RX ORDER — PREDNISONE 10 MG/1
TABLET ORAL
Qty: 30 TABLET | Refills: 0 | Status: SHIPPED | OUTPATIENT
Start: 2019-06-17 | End: 2019-11-13

## 2019-06-17 NOTE — TELEPHONE ENCOUNTER
----- Message from Alexy Palmer sent at 6/17/2019  1:35 PM EDT -----  Contact: kelly  Has rash on legs that has come back was told to call in if it came back

## 2019-06-17 NOTE — TELEPHONE ENCOUNTER
Please have patient restart the prednisone 10 mg tablet 4 daily, 3 daily, 2 daily, 1 daily each dose x 3 days. Please have him follow-up with us does not take care of the problem. Please ask him if he would like to go see dermatology and we can refer him.

## 2019-11-13 ENCOUNTER — OFFICE VISIT (OUTPATIENT)
Dept: FAMILY MEDICINE CLINIC | Age: 69
End: 2019-11-13
Payer: COMMERCIAL

## 2019-11-13 VITALS
OXYGEN SATURATION: 98 % | DIASTOLIC BLOOD PRESSURE: 79 MMHG | WEIGHT: 173.4 LBS | HEART RATE: 63 BPM | SYSTOLIC BLOOD PRESSURE: 130 MMHG | HEIGHT: 67 IN | BODY MASS INDEX: 27.21 KG/M2

## 2019-11-13 DIAGNOSIS — I10 ESSENTIAL HYPERTENSION: Primary | ICD-10-CM

## 2019-11-13 DIAGNOSIS — I10 ESSENTIAL HYPERTENSION: ICD-10-CM

## 2019-11-13 DIAGNOSIS — E78.2 MIXED HYPERLIPIDEMIA: ICD-10-CM

## 2019-11-13 DIAGNOSIS — E34.9 TESTOSTERONE DEFICIENCY: ICD-10-CM

## 2019-11-13 DIAGNOSIS — Z23 NEED FOR INFLUENZA VACCINATION: ICD-10-CM

## 2019-11-13 PROCEDURE — 99213 OFFICE O/P EST LOW 20 MIN: CPT | Performed by: FAMILY MEDICINE

## 2019-11-13 PROCEDURE — 90653 IIV ADJUVANT VACCINE IM: CPT | Performed by: FAMILY MEDICINE

## 2019-11-13 PROCEDURE — G0008 ADMIN INFLUENZA VIRUS VAC: HCPCS | Performed by: FAMILY MEDICINE

## 2019-11-13 ASSESSMENT — ENCOUNTER SYMPTOMS
DIARRHEA: 0
SHORTNESS OF BREATH: 0
VOMITING: 0
COUGH: 0
NAUSEA: 0
ABDOMINAL PAIN: 0

## 2019-11-15 LAB
A/G RATIO: 1.7 (ref 1.1–2.2)
ALBUMIN SERPL-MCNC: 4.4 G/DL (ref 3.4–5)
ALP BLD-CCNC: 70 U/L (ref 40–129)
ALT SERPL-CCNC: 24 U/L (ref 10–40)
ANION GAP SERPL CALCULATED.3IONS-SCNC: 12 MMOL/L (ref 3–16)
AST SERPL-CCNC: 23 U/L (ref 15–37)
BASOPHILS ABSOLUTE: 0.1 K/UL (ref 0–0.2)
BASOPHILS RELATIVE PERCENT: 1.1 %
BILIRUB SERPL-MCNC: <0.2 MG/DL (ref 0–1)
BUN BLDV-MCNC: 14 MG/DL (ref 7–20)
CALCIUM SERPL-MCNC: 9.5 MG/DL (ref 8.3–10.6)
CHLORIDE BLD-SCNC: 102 MMOL/L (ref 99–110)
CHOLESTEROL, TOTAL: 215 MG/DL (ref 0–199)
CO2: 26 MMOL/L (ref 21–32)
CREAT SERPL-MCNC: 0.9 MG/DL (ref 0.8–1.3)
EOSINOPHILS ABSOLUTE: 0.2 K/UL (ref 0–0.6)
EOSINOPHILS RELATIVE PERCENT: 3.3 %
GFR AFRICAN AMERICAN: >60
GFR NON-AFRICAN AMERICAN: >60
GLOBULIN: 2.6 G/DL
GLUCOSE BLD-MCNC: 86 MG/DL (ref 70–99)
HCT VFR BLD CALC: 45.8 % (ref 40.5–52.5)
HDLC SERPL-MCNC: 59 MG/DL (ref 40–60)
HEMOGLOBIN: 15 G/DL (ref 13.5–17.5)
LDL CHOLESTEROL CALCULATED: 139 MG/DL
LYMPHOCYTES ABSOLUTE: 2.3 K/UL (ref 1–5.1)
LYMPHOCYTES RELATIVE PERCENT: 30.5 %
MCH RBC QN AUTO: 30.2 PG (ref 26–34)
MCHC RBC AUTO-ENTMCNC: 32.8 G/DL (ref 31–36)
MCV RBC AUTO: 92.1 FL (ref 80–100)
MONOCYTES ABSOLUTE: 1 K/UL (ref 0–1.3)
MONOCYTES RELATIVE PERCENT: 13.7 %
NEUTROPHILS ABSOLUTE: 3.8 K/UL (ref 1.7–7.7)
NEUTROPHILS RELATIVE PERCENT: 51.4 %
PDW BLD-RTO: 14.3 % (ref 12.4–15.4)
PLATELET # BLD: 233 K/UL (ref 135–450)
PMV BLD AUTO: 8.9 FL (ref 5–10.5)
POTASSIUM SERPL-SCNC: 4.7 MMOL/L (ref 3.5–5.1)
RBC # BLD: 4.97 M/UL (ref 4.2–5.9)
SODIUM BLD-SCNC: 140 MMOL/L (ref 136–145)
TOTAL PROTEIN: 7 G/DL (ref 6.4–8.2)
TRIGL SERPL-MCNC: 85 MG/DL (ref 0–150)
VLDLC SERPL CALC-MCNC: 17 MG/DL
WBC # BLD: 7.5 K/UL (ref 4–11)

## 2019-11-18 ENCOUNTER — TELEPHONE (OUTPATIENT)
Dept: FAMILY MEDICINE CLINIC | Age: 69
End: 2019-11-18

## 2019-11-18 RX ORDER — AMLODIPINE BESYLATE 10 MG/1
10 TABLET ORAL DAILY
COMMUNITY
End: 2019-11-18 | Stop reason: SDUPTHER

## 2019-11-19 ENCOUNTER — TELEPHONE (OUTPATIENT)
Dept: FAMILY MEDICINE CLINIC | Age: 69
End: 2019-11-19

## 2019-11-19 LAB — TESTOSTERONE TOTAL: 467 NG/DL (ref 220–1000)

## 2019-11-19 RX ORDER — AMLODIPINE BESYLATE 10 MG/1
10 TABLET ORAL DAILY
Qty: 30 TABLET | Refills: 5 | Status: SHIPPED | OUTPATIENT
Start: 2019-11-19 | End: 2019-11-20 | Stop reason: CLARIF

## 2019-11-20 ENCOUNTER — TELEPHONE (OUTPATIENT)
Dept: FAMILY MEDICINE CLINIC | Age: 69
End: 2019-11-20

## 2019-11-20 DIAGNOSIS — E78.2 MIXED HYPERLIPIDEMIA: Primary | ICD-10-CM

## 2019-11-20 RX ORDER — ATORVASTATIN CALCIUM 10 MG/1
10 TABLET, FILM COATED ORAL DAILY
Qty: 30 TABLET | Refills: 1 | Status: SHIPPED | OUTPATIENT
Start: 2019-11-20 | End: 2019-12-13 | Stop reason: SDUPTHER

## 2019-12-01 DIAGNOSIS — I10 ESSENTIAL HYPERTENSION: ICD-10-CM

## 2019-12-02 RX ORDER — LISINOPRIL 30 MG/1
TABLET ORAL
Qty: 90 TABLET | Refills: 1 | Status: SHIPPED | OUTPATIENT
Start: 2019-12-02 | End: 2019-12-11

## 2019-12-11 DIAGNOSIS — I10 ESSENTIAL HYPERTENSION: ICD-10-CM

## 2019-12-11 RX ORDER — LISINOPRIL 30 MG/1
TABLET ORAL
Qty: 90 TABLET | Refills: 1 | Status: SHIPPED | OUTPATIENT
Start: 2019-12-11 | End: 2020-05-15 | Stop reason: SDUPTHER

## 2019-12-12 ENCOUNTER — TELEPHONE (OUTPATIENT)
Dept: FAMILY MEDICINE CLINIC | Age: 69
End: 2019-12-12

## 2019-12-12 DIAGNOSIS — I10 ESSENTIAL HYPERTENSION: ICD-10-CM

## 2019-12-13 DIAGNOSIS — E78.2 MIXED HYPERLIPIDEMIA: ICD-10-CM

## 2019-12-13 RX ORDER — ATORVASTATIN CALCIUM 10 MG/1
TABLET, FILM COATED ORAL
Qty: 30 TABLET | Refills: 1 | Status: SHIPPED | OUTPATIENT
Start: 2019-12-13 | End: 2020-01-14

## 2020-05-15 ENCOUNTER — OFFICE VISIT (OUTPATIENT)
Dept: FAMILY MEDICINE CLINIC | Age: 70
End: 2020-05-15
Payer: COMMERCIAL

## 2020-05-15 VITALS
BODY MASS INDEX: 27.1 KG/M2 | DIASTOLIC BLOOD PRESSURE: 60 MMHG | SYSTOLIC BLOOD PRESSURE: 110 MMHG | WEIGHT: 173 LBS | HEART RATE: 59 BPM | TEMPERATURE: 98.3 F | OXYGEN SATURATION: 96 %

## 2020-05-15 DIAGNOSIS — I10 ESSENTIAL HYPERTENSION: ICD-10-CM

## 2020-05-15 DIAGNOSIS — E78.2 MIXED HYPERLIPIDEMIA: ICD-10-CM

## 2020-05-15 LAB
A/G RATIO: 1.4 (ref 1.1–2.2)
ALBUMIN SERPL-MCNC: 4.2 G/DL (ref 3.4–5)
ALP BLD-CCNC: 71 U/L (ref 40–129)
ALT SERPL-CCNC: 23 U/L (ref 10–40)
ANION GAP SERPL CALCULATED.3IONS-SCNC: 8 MMOL/L (ref 3–16)
AST SERPL-CCNC: 21 U/L (ref 15–37)
BILIRUB SERPL-MCNC: 0.5 MG/DL (ref 0–1)
BUN BLDV-MCNC: 16 MG/DL (ref 7–20)
CALCIUM SERPL-MCNC: 9.2 MG/DL (ref 8.3–10.6)
CHLORIDE BLD-SCNC: 104 MMOL/L (ref 99–110)
CHOLESTEROL, TOTAL: 150 MG/DL (ref 0–199)
CO2: 29 MMOL/L (ref 21–32)
CREAT SERPL-MCNC: 0.8 MG/DL (ref 0.8–1.3)
GFR AFRICAN AMERICAN: >60
GFR NON-AFRICAN AMERICAN: >60
GLOBULIN: 2.9 G/DL
GLUCOSE BLD-MCNC: 85 MG/DL (ref 70–99)
HDLC SERPL-MCNC: 65 MG/DL (ref 40–60)
LDL CHOLESTEROL CALCULATED: 71 MG/DL
POTASSIUM SERPL-SCNC: 5.2 MMOL/L (ref 3.5–5.1)
SODIUM BLD-SCNC: 141 MMOL/L (ref 136–145)
TOTAL PROTEIN: 7.1 G/DL (ref 6.4–8.2)
TRIGL SERPL-MCNC: 70 MG/DL (ref 0–150)
VLDLC SERPL CALC-MCNC: 14 MG/DL

## 2020-05-15 PROCEDURE — 99213 OFFICE O/P EST LOW 20 MIN: CPT | Performed by: FAMILY MEDICINE

## 2020-05-15 RX ORDER — LISINOPRIL 30 MG/1
TABLET ORAL
Qty: 90 TABLET | Refills: 1 | Status: SHIPPED | OUTPATIENT
Start: 2020-05-15 | End: 2020-11-06

## 2020-05-15 ASSESSMENT — ENCOUNTER SYMPTOMS
ABDOMINAL PAIN: 0
BLOOD IN STOOL: 0
NAUSEA: 0
CHEST TIGHTNESS: 0
TROUBLE SWALLOWING: 0
WHEEZING: 0
EYE PAIN: 0
SHORTNESS OF BREATH: 0
DIARRHEA: 0
VOMITING: 0

## 2020-05-15 ASSESSMENT — PATIENT HEALTH QUESTIONNAIRE - PHQ9
2. FEELING DOWN, DEPRESSED OR HOPELESS: 0
SUM OF ALL RESPONSES TO PHQ9 QUESTIONS 1 & 2: 0
SUM OF ALL RESPONSES TO PHQ QUESTIONS 1-9: 0
SUM OF ALL RESPONSES TO PHQ QUESTIONS 1-9: 0
1. LITTLE INTEREST OR PLEASURE IN DOING THINGS: 0

## 2020-05-15 NOTE — PROGRESS NOTES
education: None    Highest education level: None   Occupational History     Comment: Retired   Social Needs    Financial resource strain: None    Food insecurity     Worry: None     Inability: None    Transportation needs     Medical: None     Non-medical: None   Tobacco Use    Smoking status: Current Every Day Smoker     Packs/day: 0.50     Years: 54.00     Pack years: 27.00     Types: Cigars     Start date: 5/15/1965    Smokeless tobacco: Never Used   Substance and Sexual Activity    Alcohol use: Yes    Drug use: No    Sexual activity: None   Lifestyle    Physical activity     Days per week: None     Minutes per session: None    Stress: None   Relationships    Social connections     Talks on phone: None     Gets together: None     Attends Lutheran service: None     Active member of club or organization: None     Attends meetings of clubs or organizations: None     Relationship status: None    Intimate partner violence     Fear of current or ex partner: None     Emotionally abused: None     Physically abused: None     Forced sexual activity: None   Other Topics Concern    None   Social History Narrative    None        SURGICAL HISTORY  No past surgical history on file. CURRENT MEDICATIONS  Current Outpatient Medications   Medication Sig Dispense Refill    lisinopril (PRINIVIL;ZESTRIL) 30 MG tablet PLEASE SEE ATTACHED FOR DETAILED DIRECTIONS 90 tablet 1    atorvastatin (LIPITOR) 10 MG tablet TAKE 1 TABLET BY MOUTH EVERY DAY 90 tablet 1    vitamin B-12 (CYANOCOBALAMIN) 1000 MCG tablet Take 1,000 mcg by mouth daily       No current facility-administered medications for this visit. ALLERGIES  No Known Allergies    PHYSICAL EXAM    /60 (Site: Right Upper Arm, Position: Sitting, Cuff Size: Large Adult)   Pulse 59   Temp 98.3 °F (36.8 °C)   Wt 173 lb (78.5 kg)   SpO2 96%   BMI 27.10 kg/m²     Physical Exam  Vitals signs and nursing note reviewed.    Constitutional:

## 2020-07-02 RX ORDER — ATORVASTATIN CALCIUM 10 MG/1
TABLET, FILM COATED ORAL
Qty: 90 TABLET | Refills: 1 | Status: SHIPPED | OUTPATIENT
Start: 2020-07-02 | End: 2020-12-21 | Stop reason: SDUPTHER

## 2020-11-04 ASSESSMENT — LIFESTYLE VARIABLES
HAVE YOU OR SOMEONE ELSE BEEN INJURED AS A RESULT OF YOUR DRINKING: NO
HOW OFTEN DURING THE LAST YEAR HAVE YOU FOUND THAT YOU WERE NOT ABLE TO STOP DRINKING ONCE YOU HAD STARTED: 0
HOW OFTEN DURING THE LAST YEAR HAVE YOU BEEN UNABLE TO REMEMBER WHAT HAPPENED THE NIGHT BEFORE BECAUSE YOU HAD BEEN DRINKING: NEVER
HOW OFTEN DO YOU HAVE A DRINK CONTAINING ALCOHOL: 4
HOW MANY STANDARD DRINKS CONTAINING ALCOHOL DO YOU HAVE ON A TYPICAL DAY: ONE OR TWO
HOW OFTEN DURING THE LAST YEAR HAVE YOU HAD A FEELING OF GUILT OR REMORSE AFTER DRINKING: 0
AUDIT TOTAL SCORE: 5
HOW OFTEN DURING THE LAST YEAR HAVE YOU HAD A FEELING OF GUILT OR REMORSE AFTER DRINKING: NEVER
HOW OFTEN DURING THE LAST YEAR HAVE YOU BEEN UNABLE TO REMEMBER WHAT HAPPENED THE NIGHT BEFORE BECAUSE YOU HAD BEEN DRINKING: 0
AUDIT-C TOTAL SCORE: 5
HAS A RELATIVE, FRIEND, DOCTOR, OR ANOTHER HEALTH PROFESSIONAL EXPRESSED CONCERN ABOUT YOUR DRINKING OR SUGGESTED YOU CUT DOWN: NO
HOW OFTEN DURING THE LAST YEAR HAVE YOU FOUND THAT YOU WERE NOT ABLE TO STOP DRINKING ONCE YOU HAD STARTED: NEVER
HOW OFTEN DURING THE LAST YEAR HAVE YOU FAILED TO DO WHAT WAS NORMALLY EXPECTED FROM YOU BECAUSE OF DRINKING: 0
HOW OFTEN DURING THE LAST YEAR HAVE YOU FAILED TO DO WHAT WAS NORMALLY EXPECTED FROM YOU BECAUSE OF DRINKING: NEVER
HOW OFTEN DURING THE LAST YEAR HAVE YOU NEEDED AN ALCOHOLIC DRINK FIRST THING IN THE MORNING TO GET YOURSELF GOING AFTER A NIGHT OF HEAVY DRINKING: 0
HOW OFTEN DURING THE LAST YEAR HAVE YOU NEEDED AN ALCOHOLIC DRINK FIRST THING IN THE MORNING TO GET YOURSELF GOING AFTER A NIGHT OF HEAVY DRINKING: NEVER
AUDIT TOTAL SCORE: 0
HOW OFTEN DO YOU HAVE SIX OR MORE DRINKS ON ONE OCCASION: LESS THAN MONTHLY
HOW MANY STANDARD DRINKS CONTAINING ALCOHOL DO YOU HAVE ON A TYPICAL DAY: 0
HAS A RELATIVE, FRIEND, DOCTOR, OR ANOTHER HEALTH PROFESSIONAL EXPRESSED CONCERN ABOUT YOUR DRINKING OR SUGGESTED YOU CUT DOWN: 0
HAVE YOU OR SOMEONE ELSE BEEN INJURED AS A RESULT OF YOUR DRINKING: 0
HOW OFTEN DO YOU HAVE A DRINK CONTAINING ALCOHOL: FOUR OR MORE TIMES A WEEK
AUDIT-C TOTAL SCORE: 0
HOW OFTEN DO YOU HAVE SIX OR MORE DRINKS ON ONE OCCASION: 1

## 2020-11-04 ASSESSMENT — PATIENT HEALTH QUESTIONNAIRE - PHQ9
SUM OF ALL RESPONSES TO PHQ QUESTIONS 1-9: 0
SUM OF ALL RESPONSES TO PHQ QUESTIONS 1-9: 0
2. FEELING DOWN, DEPRESSED OR HOPELESS: 0
SUM OF ALL RESPONSES TO PHQ QUESTIONS 1-9: 0
SUM OF ALL RESPONSES TO PHQ9 QUESTIONS 1 & 2: 0
1. LITTLE INTEREST OR PLEASURE IN DOING THINGS: 0

## 2020-11-06 RX ORDER — LISINOPRIL 30 MG/1
TABLET ORAL
Qty: 90 TABLET | Refills: 1 | Status: SHIPPED | OUTPATIENT
Start: 2020-11-06 | End: 2021-05-07

## 2020-11-11 ENCOUNTER — OFFICE VISIT (OUTPATIENT)
Dept: FAMILY MEDICINE CLINIC | Age: 70
End: 2020-11-11
Payer: COMMERCIAL

## 2020-11-11 ENCOUNTER — VIRTUAL VISIT (OUTPATIENT)
Dept: FAMILY MEDICINE CLINIC | Age: 70
End: 2020-11-11
Payer: COMMERCIAL

## 2020-11-11 VITALS
TEMPERATURE: 97.2 F | DIASTOLIC BLOOD PRESSURE: 68 MMHG | HEIGHT: 67 IN | BODY MASS INDEX: 27.91 KG/M2 | OXYGEN SATURATION: 95 % | WEIGHT: 177.8 LBS | SYSTOLIC BLOOD PRESSURE: 122 MMHG | HEART RATE: 65 BPM

## 2020-11-11 VITALS
HEART RATE: 65 BPM | OXYGEN SATURATION: 95 % | HEIGHT: 67 IN | WEIGHT: 177 LBS | BODY MASS INDEX: 27.78 KG/M2 | SYSTOLIC BLOOD PRESSURE: 122 MMHG | DIASTOLIC BLOOD PRESSURE: 68 MMHG | TEMPERATURE: 97.2 F

## 2020-11-11 PROBLEM — F17.200 SMOKING: Status: ACTIVE | Noted: 2020-11-11

## 2020-11-11 PROCEDURE — 90732 PPSV23 VACC 2 YRS+ SUBQ/IM: CPT | Performed by: FAMILY MEDICINE

## 2020-11-11 PROCEDURE — 99214 OFFICE O/P EST MOD 30 MIN: CPT | Performed by: FAMILY MEDICINE

## 2020-11-11 PROCEDURE — G0438 PPPS, INITIAL VISIT: HCPCS | Performed by: FAMILY MEDICINE

## 2020-11-11 PROCEDURE — G0009 ADMIN PNEUMOCOCCAL VACCINE: HCPCS | Performed by: FAMILY MEDICINE

## 2020-11-11 ASSESSMENT — LIFESTYLE VARIABLES
AUDIT TOTAL SCORE: 4
HOW OFTEN DURING THE LAST YEAR HAVE YOU HAD A FEELING OF GUILT OR REMORSE AFTER DRINKING: 0
HAVE YOU OR SOMEONE ELSE BEEN INJURED AS A RESULT OF YOUR DRINKING: 0
HOW OFTEN DURING THE LAST YEAR HAVE YOU BEEN UNABLE TO REMEMBER WHAT HAPPENED THE NIGHT BEFORE BECAUSE YOU HAD BEEN DRINKING: 0
HOW OFTEN DURING THE LAST YEAR HAVE YOU FOUND THAT YOU WERE NOT ABLE TO STOP DRINKING ONCE YOU HAD STARTED: 0
HAS A RELATIVE, FRIEND, DOCTOR, OR ANOTHER HEALTH PROFESSIONAL EXPRESSED CONCERN ABOUT YOUR DRINKING OR SUGGESTED YOU CUT DOWN: 0
HOW OFTEN DO YOU HAVE SIX OR MORE DRINKS ON ONE OCCASION: 0
HOW MANY STANDARD DRINKS CONTAINING ALCOHOL DO YOU HAVE ON A TYPICAL DAY: 0
HOW OFTEN DO YOU HAVE A DRINK CONTAINING ALCOHOL: 4
HOW OFTEN DURING THE LAST YEAR HAVE YOU NEEDED AN ALCOHOLIC DRINK FIRST THING IN THE MORNING TO GET YOURSELF GOING AFTER A NIGHT OF HEAVY DRINKING: 0
AUDIT-C TOTAL SCORE: 4
HOW OFTEN DURING THE LAST YEAR HAVE YOU FAILED TO DO WHAT WAS NORMALLY EXPECTED FROM YOU BECAUSE OF DRINKING: 0

## 2020-11-11 ASSESSMENT — PATIENT HEALTH QUESTIONNAIRE - PHQ9
SUM OF ALL RESPONSES TO PHQ9 QUESTIONS 1 & 2: 0
2. FEELING DOWN, DEPRESSED OR HOPELESS: 0
SUM OF ALL RESPONSES TO PHQ QUESTIONS 1-9: 0
1. LITTLE INTEREST OR PLEASURE IN DOING THINGS: 0

## 2020-11-11 ASSESSMENT — ENCOUNTER SYMPTOMS
SHORTNESS OF BREATH: 0
ABDOMINAL PAIN: 0
WHEEZING: 0
VOMITING: 0
DIARRHEA: 0
TROUBLE SWALLOWING: 0
EYE PAIN: 0
BLOOD IN STOOL: 0
CHEST TIGHTNESS: 0
NAUSEA: 0

## 2020-11-11 NOTE — PROGRESS NOTES
11/11/2020    [facility] [place] Chief Complaint   Patient presents with    6 Month Follow-Up    Wrist Pain     rght,  pt states started over the summer while golfing    Other     dry, scaly patch on rght ankle       HPI  History was obtained from the patient. Fredo Santos is a 69 y.o. male who presents today with follow-up on hypertension and hyperlipidemia. Patient remains active denies acute issues or change. Review of CMP and lipid panel have been essentially normal.  He did have a colonoscopy in February 2019 that was reasonable. On review of immunizations he is to get a Pneumovax 23 today. He received his high-dose flu shot a little over a month ago. Will consider Shingrix shot also. Patient encouraged to continue with healthy lifestyle and to stop all smoking. REVIEW OF SYMPTOMS    Review of Systems   Constitutional: Negative for activity change and fatigue. HENT: Negative for congestion, hearing loss, mouth sores and trouble swallowing. Eyes: Negative for pain and visual disturbance. Respiratory: Negative for chest tightness, shortness of breath and wheezing. Cardiovascular: Negative for chest pain and palpitations. Gastrointestinal: Negative for abdominal pain, blood in stool, diarrhea, nausea and vomiting. Endocrine: Negative. Negative for polydipsia and polyphagia. Genitourinary: Negative for dysuria, frequency and urgency. Musculoskeletal: Negative for arthralgias, gait problem and neck stiffness. Skin: Negative for rash. Allergic/Immunologic: Negative for environmental allergies. Neurological: Negative for dizziness, seizures, speech difficulty and weakness. Hematological: Does not bruise/bleed easily. Psychiatric/Behavioral: Negative for agitation, confusion, hallucinations and suicidal ideas. The patient is not nervous/anxious. PAST MEDICAL HISTORY  No past medical history on file.     FAMILY HISTORY  Family History   Problem Relation Age of Onset    Dementia Mother     Heart Failure Father        SOCIAL HISTORY  Social History     Socioeconomic History    Marital status:      Spouse name: Not on file    Number of children: 2    Years of education: Not on file    Highest education level: Not on file   Occupational History     Comment: Retired   Social Needs    Financial resource strain: Not on file    Food insecurity     Worry: Not on file     Inability: Not on file   [employer] needs     Medical: Not on file     Non-medical: Not on file   Tobacco Use    Smoking status: Current Every Day Smoker     Packs/day: 0.50     Years: 54.00     Pack years: 27.00     Types: Cigars     Start date: 5/15/1965    Smokeless tobacco: Never Used   Substance and Sexual Activity    Alcohol use: Yes    Drug use: No    Sexual activity: Not on file   Lifestyle    Physical activity     Days per week: Not on file     Minutes per session: Not on file    Stress: Not on file   Relationships    Social connections     Talks on phone: Not on file     Gets together: Not on file     Attends Taoism service: Not on file     Active member of club or organization: Not on file     Attends meetings of clubs or organizations: Not on file     Relationship status: Not on file    Intimate partner violence     Fear of current or ex partner: Not on file     Emotionally abused: Not on file     Physically abused: Not on file     Forced sexual activity: Not on file   Other Topics Concern    Not on file   Social History Narrative    Not on file        SURGICAL HISTORY  No past surgical history on file.               CURRENT MEDICATIONS  Current Outpatient Medications   Medication Sig Dispense Refill    lisinopril (PRINIVIL;ZESTRIL) 30 MG tablet PLEASE SEE ATTACHED FOR DETAILED DIRECTIONS 90 tablet 1    atorvastatin (LIPITOR) 10 MG tablet TAKE 1 TABLET BY MOUTH EVERY DAY 90 tablet 1    vitamin B-12 (CYANOCOBALAMIN) 1000 MCG tablet Take 1,000 mcg by mouth daily Pt states he is taking 2500 mcg       No current facility-administered medications for this visit. ALLERGIES  No Known Allergies    PHYSICAL EXAM    /68   Pulse 65   Temp 97.2 °F (36.2 °C)   Ht 5' 7\" (1.702 m)   Wt 177 lb 12.8 oz (80.6 kg)   SpO2 95%   BMI 27.85 kg/m²     Physical Exam  Vitals signs and nursing note reviewed. Constitutional:       General: He is not in acute distress. Appearance: He is well-developed and normal weight. He is not ill-appearing. HENT:      Head: Normocephalic and atraumatic. Nose: Nose normal.      Mouth/Throat:      Mouth: Mucous membranes are moist.      Pharynx: Oropharynx is clear. Eyes:      Pupils: Pupils are equal, round, and reactive to light. Neck:      Musculoskeletal: Normal range of motion and neck supple. Cardiovascular:      Rate and Rhythm: Normal rate and regular rhythm. Heart sounds: Normal heart sounds. Pulmonary:      Effort: Pulmonary effort is normal.      Breath sounds: Normal breath sounds. Abdominal:      Palpations: Abdomen is soft. Musculoskeletal: Normal range of motion. Skin:     General: Skin is warm and dry. Neurological:      General: No focal deficit present. Mental Status: He is alert and oriented to person, place, and time. Mental status is at baseline. Cranial Nerves: No cranial nerve deficit. Motor: No weakness. Psychiatric:         Mood and Affect: Mood normal.         Behavior: Behavior normal.         Thought Content: Thought content normal.         ASSESSMENT & PLAN     Diagnosis Orders   1. Essential hypertension     2. Mixed hyperlipidemia     3. Smoking     Patient received Pneumovax 23 today will consider Shingrix shot. Encouraged to stay physically active stay on regular medication and stop all smoking. He is to continue to socially distance as able. Call with questions or problems. Return in about 6 months (around 5/11/2021).          Electronically signed by Dennis Hough Brayan Han MD on 11/11/2020

## 2020-11-11 NOTE — PROGRESS NOTES
Medicare Annual Wellness Visit  Name: Hernandez Ramirez [id number] Date: 2020   MRN: [id number] Sex: Male   Age: 69 y.o. Ethnicity: Non-/Non    : 1950 Race: Anselmo Cook. is here for Medicare AWV    Screenings for behavioral, psychosocial and functional/safety risks, and cognitive dysfunction are all negative except as indicated below. These results, as well as other patient data from the [address] form, are documented in Flowsheets linked to this Encounter. No Known Allergies    Prior to Visit Medications    Medication Sig Taking? Authorizing Provider   lisinopril (PRINIVIL;ZESTRIL) 30 MG tablet PLEASE SEE ATTACHED FOR DETAILED DIRECTIONS Yes Augusta Correa MD   atorvastatin (LIPITOR) 10 MG tablet TAKE 1 TABLET BY MOUTH EVERY DAY Yes Augusta Correa MD   vitamin B-12 (CYANOCOBALAMIN) 1000 MCG tablet Take 1,000 mcg by mouth daily Pt states he is taking 2500 mcg Yes Historical Provider, MD       No past medical history on file. No past surgical history on file. Family History   Problem Relation Age of Onset    Dementia Mother     Heart Failure Father        CareTeam (Including outside providers/suppliers regularly involved in providing care):   Patient Care Team:  Augusta Correa MD as PCP - General (Family Medicine)  Augusta Correa MD as PCP - [facility] [place]    Wt Readings from Last 3 Encounters:   20 177 lb (80.3 kg)   20 177 lb 12.8 oz (80.6 kg)   05/15/20 173 lb (78.5 kg)     Vitals:    20 1434   BP: 122/68   Pulse: 65   Temp: 97.2 °F (36.2 °C)   SpO2: 95%   Weight: 177 lb (80.3 kg)   Height: 5' 7\" (1.702 m)     Body mass index is 27.72 kg/m². Based upon direct observation of the patient, evaluation of cognition reveals recent and remote memory intact. Patient's complete Health Risk Assessment and screening values have been reviewed and are found in Flowsheets.  The following problems were specialist    Personalized Preventive Plan   Current Health Maintenance Status  Immunization History   Administered Date(s) Administered    Influenza, High Dose (Fluzone 65 yrs and older) 10/26/2018    Influenza, Triv, inactivated, subunit, adjuvanted, IM (Fluad 65 yrs and older) 11/13/2019, 10/02/2020    Pneumococcal Conjugate 13-valent (Jazmine Gamboa) 04/25/2018    Tdap (Boostrix, Adacel) 04/25/2018    Zoster Recombinant (Shingrix) 04/25/2018        Health Maintenance   Topic Date Due    AAA screen  1950    Hepatitis C screen  1950    Shingles Vaccine (2 of 2) 06/20/2018    Pneumococcal 65+ years Vaccine (2 of 2 - PPSV23) 04/25/2019    Annual Wellness Visit (AWV)  05/29/2019    Lipid screen  05/15/2021    Potassium monitoring  05/15/2021    Creatinine monitoring  05/15/2021    DTaP/Tdap/Td vaccine (2 - Td) 04/25/2028    Colon cancer screen colonoscopy  02/20/2029    Flu vaccine  Completed    Hepatitis A vaccine  Aged Out    Hepatitis B vaccine  Aged Out    Hib vaccine  Aged Out    Meningococcal (ACWY) vaccine  Aged Out     Recommendations for tritrue Due: see orders and patient instructions/AVS.  . Recommended screening schedule for the next 5-10 years is provided to the patient in written form: see Patient Instructions/AVS.    Abel Mcdaniels is a 69 y.o. male being evaluated by a Virtual Visit (audio visit) encounter to address concerns as mentioned above. A caregiver was present when appropriate. Due to this being a TeleHealth encounter (During [facility] public health emergency), evaluation of the following organ systems was limited: Vitals/Constitutional/EENT/Resp/CV/GI//MS/Neuro/Skin/Heme-Lymph-Imm.   Pursuant to the emergency declaration under the [facility] waiver authority and the [employer] Act, this Virtual Visit was conducted with patient's (and/or legal guardian's) consent, to reduce the patient's risk of exposure to COVID-19 and provide necessary medical care. The patient (and/or legal guardian) has also been advised to contact this office for worsening conditions or problems, and seek emergency medical treatment and/or call 911 if deemed necessary. Patient identification was verified at the start of the visit: Yes    Total time spent for this encounter: Not billed by time    Services were provided through a audio synchronous discussion virtually to substitute for in-person clinic visit. Patient and provider were located at their individual homes. --Mere Johns on 11/11/2020 at 2:37 PM    An electronic signature was used to authenticate this note. Radhames Artis, 11/11/2020, performed the documented evaluation under the direct supervision of the attending physician. This encounter was performed under Rosa Elena castro MDs, direct supervision, 11/11/2020.

## 2020-12-22 RX ORDER — ATORVASTATIN CALCIUM 10 MG/1
10 TABLET, FILM COATED ORAL DAILY
Qty: 90 TABLET | Refills: 1 | Status: SHIPPED | OUTPATIENT
Start: 2020-12-22 | End: 2021-06-18

## 2021-05-07 DIAGNOSIS — I10 ESSENTIAL HYPERTENSION: ICD-10-CM

## 2021-05-07 RX ORDER — LISINOPRIL 30 MG/1
TABLET ORAL
Qty: 90 TABLET | Refills: 1 | Status: SHIPPED | OUTPATIENT
Start: 2021-05-07 | End: 2021-05-10

## 2021-05-10 DIAGNOSIS — I10 ESSENTIAL HYPERTENSION: ICD-10-CM

## 2021-05-10 RX ORDER — LISINOPRIL 30 MG/1
30 TABLET ORAL DAILY
Qty: 90 TABLET | Refills: 1 | Status: SHIPPED | OUTPATIENT
Start: 2021-05-10 | End: 2021-11-05

## 2021-05-12 ENCOUNTER — OFFICE VISIT (OUTPATIENT)
Dept: FAMILY MEDICINE CLINIC | Age: 71
End: 2021-05-12
Payer: COMMERCIAL

## 2021-05-12 ENCOUNTER — HOSPITAL ENCOUNTER (OUTPATIENT)
Age: 71
Discharge: HOME | End: 2021-05-12
Payer: COMMERCIAL

## 2021-05-12 ENCOUNTER — HOSPITAL ENCOUNTER (OUTPATIENT)
Dept: GENERAL RADIOLOGY | Age: 71
Discharge: HOME | End: 2021-05-12
Payer: COMMERCIAL

## 2021-05-12 VITALS
WEIGHT: 174.7 LBS | OXYGEN SATURATION: 95 % | HEIGHT: 67 IN | HEART RATE: 51 BPM | DIASTOLIC BLOOD PRESSURE: 78 MMHG | SYSTOLIC BLOOD PRESSURE: 120 MMHG | BODY MASS INDEX: 27.42 KG/M2

## 2021-05-12 DIAGNOSIS — E78.5 HYPERLIPIDEMIA, UNSPECIFIED HYPERLIPIDEMIA TYPE: ICD-10-CM

## 2021-05-12 DIAGNOSIS — M54.41 CHRONIC RIGHT-SIDED LOW BACK PAIN WITH RIGHT-SIDED SCIATICA: Primary | ICD-10-CM

## 2021-05-12 DIAGNOSIS — G89.29 CHRONIC LEFT-SIDED LOW BACK PAIN WITH LEFT-SIDED SCIATICA: ICD-10-CM

## 2021-05-12 DIAGNOSIS — J30.9 ALLERGIC RHINITIS, UNSPECIFIED SEASONALITY, UNSPECIFIED TRIGGER: ICD-10-CM

## 2021-05-12 DIAGNOSIS — M54.42 CHRONIC LEFT-SIDED LOW BACK PAIN WITH LEFT-SIDED SCIATICA: ICD-10-CM

## 2021-05-12 DIAGNOSIS — M54.42 LEFT-SIDED LOW BACK PAIN WITH LEFT-SIDED SCIATICA, UNSPECIFIED CHRONICITY: Primary | ICD-10-CM

## 2021-05-12 DIAGNOSIS — I10 ESSENTIAL HYPERTENSION: ICD-10-CM

## 2021-05-12 DIAGNOSIS — G89.29 CHRONIC RIGHT-SIDED LOW BACK PAIN WITH RIGHT-SIDED SCIATICA: Primary | ICD-10-CM

## 2021-05-12 LAB
A/G RATIO: 1.8 (ref 1.1–2.2)
ALBUMIN SERPL-MCNC: 4.5 G/DL (ref 3.4–5)
ALP BLD-CCNC: 58 U/L (ref 40–129)
ALT SERPL-CCNC: 20 U/L (ref 10–40)
ANION GAP SERPL CALCULATED.3IONS-SCNC: 11 MMOL/L (ref 3–16)
AST SERPL-CCNC: 18 U/L (ref 15–37)
BILIRUB SERPL-MCNC: 0.4 MG/DL (ref 0–1)
BUN BLDV-MCNC: 19 MG/DL (ref 7–20)
CALCIUM SERPL-MCNC: 9.9 MG/DL (ref 8.3–10.6)
CHLORIDE BLD-SCNC: 106 MMOL/L (ref 99–110)
CHOLESTEROL, TOTAL: 160 MG/DL (ref 0–199)
CO2: 28 MMOL/L (ref 21–32)
CREAT SERPL-MCNC: 0.8 MG/DL (ref 0.8–1.3)
GFR AFRICAN AMERICAN: >60
GFR NON-AFRICAN AMERICAN: >60
GLOBULIN: 2.5 G/DL
GLUCOSE BLD-MCNC: 81 MG/DL (ref 70–99)
HCT VFR BLD CALC: 46.9 % (ref 40.5–52.5)
HDLC SERPL-MCNC: 63 MG/DL (ref 40–60)
HEMOGLOBIN: 15.7 G/DL (ref 13.5–17.5)
LDL CHOLESTEROL CALCULATED: 85 MG/DL
MCH RBC QN AUTO: 31 PG (ref 26–34)
MCHC RBC AUTO-ENTMCNC: 33.5 G/DL (ref 31–36)
MCV RBC AUTO: 92.6 FL (ref 80–100)
PDW BLD-RTO: 13.6 % (ref 12.4–15.4)
PLATELET # BLD: 216 K/UL (ref 135–450)
PMV BLD AUTO: 9.1 FL (ref 5–10.5)
POTASSIUM SERPL-SCNC: 5.6 MMOL/L (ref 3.5–5.1)
RBC # BLD: 5.06 M/UL (ref 4.2–5.9)
SODIUM BLD-SCNC: 145 MMOL/L (ref 136–145)
TOTAL PROTEIN: 7 G/DL (ref 6.4–8.2)
TRIGL SERPL-MCNC: 62 MG/DL (ref 0–150)
VLDLC SERPL CALC-MCNC: 12 MG/DL
WBC # BLD: 8.4 K/UL (ref 4–11)

## 2021-05-12 PROCEDURE — 99213 OFFICE O/P EST LOW 20 MIN: CPT | Performed by: FAMILY MEDICINE

## 2021-05-12 PROCEDURE — 72100 X-RAY EXAM L-S SPINE 2/3 VWS: CPT

## 2021-05-12 RX ORDER — METHYLPREDNISOLONE 4 MG/1
TABLET ORAL
Qty: 1 KIT | Refills: 0 | Status: SHIPPED | OUTPATIENT
Start: 2021-05-12 | End: 2021-05-18

## 2021-05-12 ASSESSMENT — ENCOUNTER SYMPTOMS
NAUSEA: 0
VOMITING: 0
WHEEZING: 0
SHORTNESS OF BREATH: 0
BLOOD IN STOOL: 0
BACK PAIN: 1
EYE PAIN: 0
CHEST TIGHTNESS: 0
TROUBLE SWALLOWING: 0
ABDOMINAL PAIN: 0
DIARRHEA: 0

## 2021-05-12 ASSESSMENT — PATIENT HEALTH QUESTIONNAIRE - PHQ9
SUM OF ALL RESPONSES TO PHQ9 QUESTIONS 1 & 2: 0
SUM OF ALL RESPONSES TO PHQ QUESTIONS 1-9: 0
1. LITTLE INTEREST OR PLEASURE IN DOING THINGS: 0
2. FEELING DOWN, DEPRESSED OR HOPELESS: 0

## 2021-05-12 NOTE — PROGRESS NOTES
5/12/2021    [address] [place] [address] Chief Complaint   Patient presents with    6 Month Follow-Up    Lower Back Pain     radiates into hips and butt, on going for a while, more severe over the last 3 months, puts topical ointment, also takes ibuprofen in the morning. HPI  History was obtained from the patient. Shital Platt is a 69 y.o. male who presents today with complaints of low back pain and left leg pain pretty much continuously for 3 to 4 months. No definite history of injury. To the point he is unable to golf or do a lot of his normal activities. Bladder and bowel function have been acceptable. No obvious history of injury he has been trying topical ice and ibuprofen and Tylenol with minimal to slight improvement. This patient has had his Covid virus vaccination. Other diagnoses include hypertension hyperlipidemia and allergic rhinitis these are actually doing fairly well at this point for him. On review he does need some refills. Please note his pain is worse with walking certain positions and weightbearing. Sueellen Payment REVIEW OF SYMPTOMS    Review of Systems   Constitutional: Negative for activity change and fatigue. HENT: Negative for congestion, hearing loss, mouth sores and trouble swallowing. Eyes: Negative for pain and visual disturbance. Respiratory: Negative for chest tightness, shortness of breath and wheezing. Cardiovascular: Negative for chest pain and palpitations. Gastrointestinal: Negative for abdominal pain, blood in stool, diarrhea, nausea and vomiting. Endocrine: Negative for cold intolerance, polydipsia and polyuria. Genitourinary: Negative for dysuria, frequency and urgency. Musculoskeletal: Positive for arthralgias, back pain and gait problem. Negative for neck stiffness. Patient with severe low back and especially right low back and right leg pain causing trouble getting out of a chair and limping. Skin: Negative for rash.    Allergic/Immunologic: Negative for environmental allergies. Neurological: Negative for dizziness, seizures, speech difficulty and weakness. Hematological: Does not bruise/bleed easily. Psychiatric/Behavioral: Negative for agitation, confusion and hallucinations. PAST MEDICAL HISTORY  No past medical history on file. FAMILY HISTORY  Family History   Problem Relation Age of Onset    Dementia Mother     Heart Failure Father        SOCIAL HISTORY  Social History     Socioeconomic History    Marital status:      Spouse name: Not on file    Number of children: 2    Years of education: Not on file    Highest education level: Not on file   Occupational History     Comment: Retired   Social Needs    Financial resource strain: Not on file    Food insecurity     Worry: Not on file     Inability: Not on file   [place] needs     Medical: Not on file     Non-medical: Not on file   Tobacco Use    Smoking status: Current Every Day Smoker     Packs/day: 0.50     Years: 54.00     Pack years: 27.00     Types: Cigars     Start date: 5/15/1965    Smokeless tobacco: Never Used   Substance and Sexual Activity    Alcohol use: Yes    Drug use: No    Sexual activity: Not on file   Lifestyle    Physical activity     Days per week: Not on file     Minutes per session: Not on file    Stress: Not on file   Relationships    Social connections     Talks on phone: Not on file     Gets together: Not on file     Attends Gnosticist service: Not on file     Active member of club or organization: Not on file     Attends meetings of clubs or organizations: Not on file     Relationship status: Not on file    Intimate partner violence     Fear of current or ex partner: Not on file     Emotionally abused: Not on file     Physically abused: Not on file     Forced sexual activity: Not on file   Other Topics Concern    Not on file   Social History Narrative    Not on file        SURGICAL HISTORY  No past surgical history on file. CURRENT MEDICATIONS  Current Outpatient Medications   Medication Sig Dispense Refill    methylPREDNISolone (MEDROL DOSEPACK) 4 MG tablet Take by mouth. 1 kit 0    lisinopril (PRINIVIL;ZESTRIL) 30 MG tablet Take 1 tablet by mouth daily 90 tablet 1    atorvastatin (LIPITOR) 10 MG tablet Take 1 tablet by mouth daily 90 tablet 1    vitamin B-12 (CYANOCOBALAMIN) 1000 MCG tablet Take 1,000 mcg by mouth daily Pt states he is taking 2500 mcg       No current facility-administered medications for this visit. ALLERGIES  No Known Allergies    PHYSICAL EXAM    /78 (Site: Right Upper Arm, Position: Sitting, Cuff Size: Medium Adult)   Pulse 51   Ht 5' 7\" (1.702 m)   Wt 174 lb 11.2 oz (79.2 kg)   SpO2 95%   BMI 27.36 kg/m²     Physical Exam  Vitals signs and nursing note reviewed. Constitutional:       General: He is in acute distress. Appearance: Normal appearance. He is well-developed. He is not ill-appearing or toxic-appearing. HENT:      Head: Normocephalic and atraumatic. Nose: Nose normal.   Eyes:      Pupils: Pupils are equal, round, and reactive to light. Neck:      Musculoskeletal: Normal range of motion and neck supple. No neck rigidity. Cardiovascular:      Rate and Rhythm: Normal rate and regular rhythm. Heart sounds: Normal heart sounds. No murmur. Pulmonary:      Effort: Pulmonary effort is normal. No respiratory distress. Breath sounds: Normal breath sounds. No wheezing, rhonchi or rales. Abdominal:      Palpations: Abdomen is soft. Musculoskeletal: Normal range of motion. General: No swelling. Comments: Patient does have pain in low back has definite pain getting out of a chair has an antalgic gait no tenderness to palpation no obvious atrophy or rash seen. Much discomfort in her right lower extremity especially with weightbearing and certain movements. Lymphadenopathy:      Cervical: No cervical adenopathy.    Skin:     General: Skin is warm and dry. Neurological:      General: No focal deficit present. Mental Status: He is alert and oriented to person, place, and time. Mental status is at baseline. Cranial Nerves: No cranial nerve deficit. Psychiatric:         Mood and Affect: Mood normal.         Behavior: Behavior normal.         Thought Content: Thought content normal.         ASSESSMENT & PLAN     Diagnosis Orders   1. Chronic right-sided low back pain with right-sided sciatica  XR LUMBAR SPINE (MIN 6 VIEWS)   2. Essential hypertension  CBC    COMPREHENSIVE METABOLIC PANEL   3. Hyperlipidemia, unspecified hyperlipidemia type  LIPID PANEL   4. Allergic rhinitis, unspecified seasonality, unspecified trigger     Will get LS spine x-rays and is treated with a Medrol 4 mg Dosepak. May do some gentle stretches. Alternate ice with heat to area of pain if it helps him. Use topical lidocaine if he like we will check CBC, CMP, and lipid panel. Follow-up for these results. He is to call with status of back and left leg pain in a few days. Also check on results of the LS spine x-rays. Depending on he is how he is doing we may need to titrate treatment with addition of physical therapy, consider epidural steroid injections, or get MRI(LS spine). Return in about 6 months (around 11/12/2021), or if symptoms worsen or fail to improve.          Electronically signed by Nayana Lynch MD on 5/12/2021

## 2021-06-18 DIAGNOSIS — E78.2 MIXED HYPERLIPIDEMIA: ICD-10-CM

## 2021-06-18 RX ORDER — ATORVASTATIN CALCIUM 10 MG/1
TABLET, FILM COATED ORAL
Qty: 90 TABLET | Refills: 1 | Status: SHIPPED | OUTPATIENT
Start: 2021-06-18 | End: 2022-01-04

## 2021-11-05 DIAGNOSIS — I10 ESSENTIAL HYPERTENSION: ICD-10-CM

## 2021-11-05 RX ORDER — LISINOPRIL 30 MG/1
TABLET ORAL
Qty: 30 TABLET | Refills: 0 | Status: SHIPPED | OUTPATIENT
Start: 2021-11-05 | End: 2021-11-15 | Stop reason: SDUPTHER

## 2021-11-15 ENCOUNTER — OFFICE VISIT (OUTPATIENT)
Dept: FAMILY MEDICINE CLINIC | Age: 71
End: 2021-11-15
Payer: COMMERCIAL

## 2021-11-15 VITALS
SYSTOLIC BLOOD PRESSURE: 110 MMHG | DIASTOLIC BLOOD PRESSURE: 72 MMHG | OXYGEN SATURATION: 99 % | BODY MASS INDEX: 27.47 KG/M2 | HEART RATE: 61 BPM | HEIGHT: 67 IN | WEIGHT: 175 LBS

## 2021-11-15 DIAGNOSIS — J30.9 ALLERGIC RHINITIS, UNSPECIFIED SEASONALITY, UNSPECIFIED TRIGGER: ICD-10-CM

## 2021-11-15 DIAGNOSIS — I10 ESSENTIAL HYPERTENSION: Primary | ICD-10-CM

## 2021-11-15 DIAGNOSIS — E78.2 MIXED HYPERLIPIDEMIA: ICD-10-CM

## 2021-11-15 DIAGNOSIS — M19.041 PRIMARY OSTEOARTHRITIS OF RIGHT HAND: ICD-10-CM

## 2021-11-15 DIAGNOSIS — I10 ESSENTIAL HYPERTENSION: ICD-10-CM

## 2021-11-15 LAB
ANION GAP SERPL CALCULATED.3IONS-SCNC: 13 MMOL/L (ref 3–16)
BUN BLDV-MCNC: 19 MG/DL (ref 7–20)
CALCIUM SERPL-MCNC: 9.6 MG/DL (ref 8.3–10.6)
CHLORIDE BLD-SCNC: 104 MMOL/L (ref 99–110)
CO2: 24 MMOL/L (ref 21–32)
CREAT SERPL-MCNC: 0.9 MG/DL (ref 0.8–1.3)
GFR AFRICAN AMERICAN: >60
GFR NON-AFRICAN AMERICAN: >60
GLUCOSE BLD-MCNC: 82 MG/DL (ref 70–99)
POTASSIUM SERPL-SCNC: 5.5 MMOL/L (ref 3.5–5.1)
SODIUM BLD-SCNC: 141 MMOL/L (ref 136–145)

## 2021-11-15 PROCEDURE — 90694 VACC AIIV4 NO PRSRV 0.5ML IM: CPT | Performed by: FAMILY MEDICINE

## 2021-11-15 PROCEDURE — G0008 ADMIN INFLUENZA VIRUS VAC: HCPCS | Performed by: FAMILY MEDICINE

## 2021-11-15 PROCEDURE — 99213 OFFICE O/P EST LOW 20 MIN: CPT | Performed by: FAMILY MEDICINE

## 2021-11-15 RX ORDER — LISINOPRIL 30 MG/1
TABLET ORAL
Qty: 90 TABLET | Refills: 1 | Status: SHIPPED | OUTPATIENT
Start: 2021-11-15 | End: 2022-05-09

## 2021-11-15 ASSESSMENT — LIFESTYLE VARIABLES
HOW OFTEN DURING THE LAST YEAR HAVE YOU HAD A FEELING OF GUILT OR REMORSE AFTER DRINKING: 0
AUDIT TOTAL SCORE: 0
HOW OFTEN DO YOU HAVE A DRINK CONTAINING ALCOHOL: FOUR OR MORE TIMES A WEEK
AUDIT-C TOTAL SCORE: 4
HOW OFTEN DURING THE LAST YEAR HAVE YOU BEEN UNABLE TO REMEMBER WHAT HAPPENED THE NIGHT BEFORE BECAUSE YOU HAD BEEN DRINKING: NEVER
HOW OFTEN DURING THE LAST YEAR HAVE YOU FOUND THAT YOU WERE NOT ABLE TO STOP DRINKING ONCE YOU HAD STARTED: 0
AUDIT TOTAL SCORE: 4
HOW MANY STANDARD DRINKS CONTAINING ALCOHOL DO YOU HAVE ON A TYPICAL DAY: 0
HOW OFTEN DO YOU HAVE A DRINK CONTAINING ALCOHOL: 4
HOW MANY STANDARD DRINKS CONTAINING ALCOHOL DO YOU HAVE ON A TYPICAL DAY: ONE OR TWO
HOW OFTEN DO YOU HAVE SIX OR MORE DRINKS ON ONE OCCASION: 0
HOW OFTEN DO YOU HAVE SIX OR MORE DRINKS ON ONE OCCASION: NEVER
HAS A RELATIVE, FRIEND, DOCTOR, OR ANOTHER HEALTH PROFESSIONAL EXPRESSED CONCERN ABOUT YOUR DRINKING OR SUGGESTED YOU CUT DOWN: 0
HOW OFTEN DURING THE LAST YEAR HAVE YOU FAILED TO DO WHAT WAS NORMALLY EXPECTED FROM YOU BECAUSE OF DRINKING: NEVER
HOW OFTEN DURING THE LAST YEAR HAVE YOU NEEDED AN ALCOHOLIC DRINK FIRST THING IN THE MORNING TO GET YOURSELF GOING AFTER A NIGHT OF HEAVY DRINKING: 0
HOW OFTEN DURING THE LAST YEAR HAVE YOU HAD A FEELING OF GUILT OR REMORSE AFTER DRINKING: NEVER
AUDIT-C TOTAL SCORE: 0
HOW OFTEN DURING THE LAST YEAR HAVE YOU NEEDED AN ALCOHOLIC DRINK FIRST THING IN THE MORNING TO GET YOURSELF GOING AFTER A NIGHT OF HEAVY DRINKING: NEVER
HOW OFTEN DURING THE LAST YEAR HAVE YOU FOUND THAT YOU WERE NOT ABLE TO STOP DRINKING ONCE YOU HAD STARTED: NEVER
HOW OFTEN DURING THE LAST YEAR HAVE YOU BEEN UNABLE TO REMEMBER WHAT HAPPENED THE NIGHT BEFORE BECAUSE YOU HAD BEEN DRINKING: 0
HAVE YOU OR SOMEONE ELSE BEEN INJURED AS A RESULT OF YOUR DRINKING: NO
HAS A RELATIVE, FRIEND, DOCTOR, OR ANOTHER HEALTH PROFESSIONAL EXPRESSED CONCERN ABOUT YOUR DRINKING OR SUGGESTED YOU CUT DOWN: NO
HAVE YOU OR SOMEONE ELSE BEEN INJURED AS A RESULT OF YOUR DRINKING: 0
HOW OFTEN DURING THE LAST YEAR HAVE YOU FAILED TO DO WHAT WAS NORMALLY EXPECTED FROM YOU BECAUSE OF DRINKING: 0

## 2021-11-15 ASSESSMENT — ENCOUNTER SYMPTOMS
VOMITING: 0
WHEEZING: 0
ABDOMINAL PAIN: 0
BACK PAIN: 1
TROUBLE SWALLOWING: 0
SHORTNESS OF BREATH: 0
DIARRHEA: 0
CHEST TIGHTNESS: 0
EYE PAIN: 0
NAUSEA: 0
BLOOD IN STOOL: 0

## 2021-11-15 ASSESSMENT — PATIENT HEALTH QUESTIONNAIRE - PHQ9
SUM OF ALL RESPONSES TO PHQ QUESTIONS 1-9: 0
2. FEELING DOWN, DEPRESSED OR HOPELESS: 0
SUM OF ALL RESPONSES TO PHQ9 QUESTIONS 1 & 2: 0
1. LITTLE INTEREST OR PLEASURE IN DOING THINGS: 0
SUM OF ALL RESPONSES TO PHQ QUESTIONS 1-9: 0
SUM OF ALL RESPONSES TO PHQ QUESTIONS 1-9: 0

## 2021-11-15 NOTE — PROGRESS NOTES
11/15/2021    [address] [place] [address] Chief Complaint   Patient presents with    6 Month Follow-Up    Hand Problem     right thumb area pain. x2 months. HPI  History was obtained from the patient. Aleksey Cyr is a 66 y.o. male who presents today with follow-up on hypertension, hyperlipidemia, allergies, history of rare smoking cigar has not smoked cigarettes for over 25 years I believe. Low back pain is actually better overall. He is trying to exercise works part-time at [employer] does a lot of walking there. Has some arthritic pain in the base of his right thumb. Slightly tender to the touch range normal range of motion seen. We discussed the importance of ice and Voltaren gel topically. Dinesh Miranda REVIEW OF SYMPTOMS    Review of Systems   Constitutional: Negative for activity change and fatigue. HENT: Negative for congestion, hearing loss, mouth sores and trouble swallowing. Eyes: Negative for pain and visual disturbance. Respiratory: Negative for chest tightness, shortness of breath and wheezing. Cardiovascular: Negative for chest pain and palpitations. Gastrointestinal: Negative for abdominal pain, blood in stool, diarrhea, nausea and vomiting. Genitourinary: Negative for dysuria, frequency and urgency. Musculoskeletal: Positive for arthralgias and back pain. Negative for gait problem and neck stiffness. Skin: Negative for rash. Allergic/Immunologic: Positive for environmental allergies. Neurological: Negative for dizziness, seizures, speech difficulty and weakness. Hematological: Does not bruise/bleed easily. Psychiatric/Behavioral: Negative for agitation, confusion, dysphoric mood, hallucinations and suicidal ideas. The patient is not nervous/anxious. PAST MEDICAL HISTORY  No past medical history on file.     FAMILY HISTORY  Family History   Problem Relation Age of Onset    Dementia Mother     Heart Failure Father        SOCIAL HISTORY  Social History     Socioeconomic History  Marital status:      Spouse name: None    Number of children: 2    Years of education: None    Highest education level: None   Occupational History     Comment: Retired   Tobacco Use    Smoking status: Current Every Day Smoker     Packs/day: 0.50     Years: 54.00     Pack years: 27.00     Types: Cigars     Start date: 5/15/1965    Smokeless tobacco: Never Used   Substance and Sexual Activity    Alcohol use: Yes    Drug use: No    Sexual activity: None   Other Topics Concern    None   Social History Narrative    None     Social Determinants of Health     Financial Resource Strain:     Difficulty of Paying Living Expenses: Not on file   Food Insecurity:     Worried About Running Out of Food in the Last Year: Not on file    Gaby of Food in the Last Year: Not on file   Transportation Needs:     Lack of Transportation (Medical): Not on file    Lack of Transportation (Non-Medical): Not on file   Physical Activity:     Days of Exercise per Week: Not on file    Minutes of Exercise per Session: Not on file   Stress:     Feeling of Stress : Not on file   Social Connections:     Frequency of Communication with Friends and Family: Not on file    Frequency of Social Gatherings with Friends and Family: Not on file    Attends Gnosticism Services: Not on file    Active Member of [address] [place] [address] or Organizations: Not on file    Attends Club or Organization Meetings: Not on file    Marital Status: Not on file   Intimate Partner Violence:     Fear of Current or Ex-Partner: Not on file    Emotionally Abused: Not on file    Physically Abused: Not on file    Sexually Abused: Not on file   Housing Stability:     Unable to Pay for Housing in the Last Year: Not on file    Number of [place] in the Last Year: Not on file    Unstable Housing in the Last Year: Not on file        SURGICAL HISTORY  No past surgical history on file.               CURRENT MEDICATIONS  Current Outpatient Medications Medication Sig Dispense Refill    lisinopril (PRINIVIL;ZESTRIL) 30 MG tablet TAKE 1 TABLET BY MOUTH EVERY DAY 90 tablet 1    LORATADINE PO Take 5 mg by mouth daily as needed      atorvastatin (LIPITOR) 10 MG tablet TAKE 1 TABLET BY MOUTH EVERY DAY 90 tablet 1    vitamin B-12 (CYANOCOBALAMIN) 1000 MCG tablet Take 1,000 mcg by mouth daily Pt states he is taking 2500 mcg       No current facility-administered medications for this visit. ALLERGIES  No Known Allergies    PHYSICAL EXAM    /72 (Site: Right Upper Arm, Position: Sitting, Cuff Size: Medium Adult)   Pulse 61   Ht 5' 7\" (1.702 m)   Wt 175 lb (79.4 kg)   SpO2 99%   BMI 27.41 kg/m²     Physical Exam  Vitals and nursing note reviewed. Constitutional:       General: He is not in acute distress. Appearance: Normal appearance. He is well-developed. He is not ill-appearing or toxic-appearing. HENT:      Head: Normocephalic and atraumatic. Nose: Nose normal. No congestion. Mouth/Throat:      Mouth: Mucous membranes are moist.      Pharynx: Oropharynx is clear. No oropharyngeal exudate. Eyes:      Pupils: Pupils are equal, round, and reactive to light. Cardiovascular:      Rate and Rhythm: Normal rate and regular rhythm. Heart sounds: Normal heart sounds. No murmur heard. Pulmonary:      Effort: Pulmonary effort is normal. No respiratory distress. Breath sounds: Normal breath sounds. No rhonchi or rales. Abdominal:      General: There is no distension. Palpations: Abdomen is soft. Musculoskeletal:         General: No swelling or deformity. Normal range of motion. Cervical back: Normal range of motion and neck supple. No rigidity. Skin:     General: Skin is warm and dry. Coloration: Skin is not jaundiced. Neurological:      General: No focal deficit present. Mental Status: He is alert and oriented to person, place, and time. Mental status is at baseline. Cranial Nerves:  No cranial nerve deficit. Motor: No weakness. Psychiatric:         Mood and Affect: Mood normal.         Behavior: Behavior normal.         Thought Content: Thought content normal.         ASSESSMENT & PLAN     Diagnosis Orders   1. Essential hypertension  lisinopril (PRINIVIL;ZESTRIL) 30 MG tablet    BASIC METABOLIC PANEL   2. Mixed hyperlipidemia     3. Primary osteoarthritis of right hand     4. Allergic rhinitis, unspecified seasonality, unspecified trigger     At this point patient did receive a high-dose flu shot. We will check BMP and med list reviewed with refills provided. Continue to work on healthy lifestyle and regular exercise. Apply ice to the area of pain right thumb and use Voltaren gel over-the-counter Aleve twice daily see how that helps I believe this pain is probably arthritic in nature. He is to call with issues or problems. He is to follow-up for test results. Return in about 6 months (around 5/15/2022).          Electronically signed by Jonel Cheney MD on 11/15/2021

## 2021-11-17 ENCOUNTER — VIRTUAL VISIT (OUTPATIENT)
Dept: FAMILY MEDICINE CLINIC | Age: 71
End: 2021-11-17
Payer: COMMERCIAL

## 2021-11-17 DIAGNOSIS — Z00.00 ROUTINE GENERAL MEDICAL EXAMINATION AT A HEALTH CARE FACILITY: Primary | ICD-10-CM

## 2021-11-17 PROCEDURE — G0439 PPPS, SUBSEQ VISIT: HCPCS | Performed by: FAMILY MEDICINE

## 2021-11-17 SDOH — ECONOMIC STABILITY: FOOD INSECURITY: WITHIN THE PAST 12 MONTHS, YOU WORRIED THAT YOUR FOOD WOULD RUN OUT BEFORE YOU GOT MONEY TO BUY MORE.: NEVER TRUE

## 2021-11-17 SDOH — ECONOMIC STABILITY: FOOD INSECURITY: WITHIN THE PAST 12 MONTHS, THE FOOD YOU BOUGHT JUST DIDN'T LAST AND YOU DIDN'T HAVE MONEY TO GET MORE.: NEVER TRUE

## 2021-11-17 ASSESSMENT — LIFESTYLE VARIABLES
HOW OFTEN DURING THE LAST YEAR HAVE YOU NEEDED AN ALCOHOLIC DRINK FIRST THING IN THE MORNING TO GET YOURSELF GOING AFTER A NIGHT OF HEAVY DRINKING: 0
HOW OFTEN DO YOU HAVE A DRINK CONTAINING ALCOHOL: 3
AUDIT TOTAL SCORE: 3
HOW OFTEN DURING THE LAST YEAR HAVE YOU FOUND THAT YOU WERE NOT ABLE TO STOP DRINKING ONCE YOU HAD STARTED: 0
HOW OFTEN DURING THE LAST YEAR HAVE YOU BEEN UNABLE TO REMEMBER WHAT HAPPENED THE NIGHT BEFORE BECAUSE YOU HAD BEEN DRINKING: 0
HOW OFTEN DURING THE LAST YEAR HAVE YOU FAILED TO DO WHAT WAS NORMALLY EXPECTED FROM YOU BECAUSE OF DRINKING: 0
HAS A RELATIVE, FRIEND, DOCTOR, OR ANOTHER HEALTH PROFESSIONAL EXPRESSED CONCERN ABOUT YOUR DRINKING OR SUGGESTED YOU CUT DOWN: 0
AUDIT-C TOTAL SCORE: 3
HAVE YOU OR SOMEONE ELSE BEEN INJURED AS A RESULT OF YOUR DRINKING: 0
HOW MANY STANDARD DRINKS CONTAINING ALCOHOL DO YOU HAVE ON A TYPICAL DAY: 0
HOW OFTEN DO YOU HAVE SIX OR MORE DRINKS ON ONE OCCASION: 0
HOW OFTEN DURING THE LAST YEAR HAVE YOU HAD A FEELING OF GUILT OR REMORSE AFTER DRINKING: 0

## 2021-11-17 ASSESSMENT — PATIENT HEALTH QUESTIONNAIRE - PHQ9
SUM OF ALL RESPONSES TO PHQ QUESTIONS 1-9: 0
SUM OF ALL RESPONSES TO PHQ QUESTIONS 1-9: 0
1. LITTLE INTEREST OR PLEASURE IN DOING THINGS: 0
SUM OF ALL RESPONSES TO PHQ QUESTIONS 1-9: 0
2. FEELING DOWN, DEPRESSED OR HOPELESS: 0
SUM OF ALL RESPONSES TO PHQ9 QUESTIONS 1 & 2: 0

## 2021-11-17 ASSESSMENT — SOCIAL DETERMINANTS OF HEALTH (SDOH): HOW HARD IS IT FOR YOU TO PAY FOR THE VERY BASICS LIKE FOOD, HOUSING, MEDICAL CARE, AND HEATING?: NOT HARD AT ALL

## 2021-11-17 NOTE — PATIENT INSTRUCTIONS
Personalized Preventive Plan for Nat Martin. - 11/17/2021  Medicare offers a range of preventive health benefits. Some of the tests and screenings are paid in full while other may be subject to a deductible, co-insurance, and/or copay. Some of these benefits include a comprehensive review of your medical history including lifestyle, illnesses that may run in your family, and various assessments and screenings as appropriate. After reviewing your medical record and screening and assessments performed today your provider may have ordered immunizations, labs, imaging, and/or referrals for you. A list of these orders (if applicable) as well as your Preventive Care list are included within your After Visit Summary for your review. Other Preventive Recommendations:    · A preventive eye exam performed by an eye specialist is recommended every 1-2 years to screen for glaucoma; cataracts, macular degeneration, and other eye disorders. · A preventive dental visit is recommended every 6 months. · Try to get at least 150 minutes of exercise per week or 10,000 steps per day on a pedometer . · Order or download the FREE \"Exercise & Physical Activity: Your Everyday Guide\" from [employer]. Call 6-654.802.5621 or search [employer] online. · You need 8621-4957 mg of calcium and 1784-8490 IU of vitamin D per day. It is possible to meet your calcium requirement with diet alone, but a vitamin D supplement is usually necessary to meet this goal.  · When exposed to the sun, use a sunscreen that protects against both UVA and UVB radiation with an SPF of 30 or greater. Reapply every 2 to 3 hours or after sweating, drying off with a towel, or swimming. · Always wear a seat belt when traveling in a car. Always wear a helmet when riding a bicycle or motorcycle.

## 2021-11-17 NOTE — PROGRESS NOTES
Medicare Annual Wellness Visit  Name: Paul MEDINA Date: 2021   MRN: [id number] Sex: Male   Age: 66 y.o. Ethnicity: Non- / Non    : 1950 Race: White (non-)      Paul Melgoza. is here for Medicare AWV    Screenings for behavioral, psychosocial and functional/safety risks, and cognitive dysfunction are all negative except as indicated below. These results, as well as other patient data from the [address] form, are documented in Flowsheets linked to this Encounter. No Known Allergies    Prior to Visit Medications    Medication Sig Taking? Authorizing Provider   lisinopril (PRINIVIL;ZESTRIL) 30 MG tablet TAKE 1 TABLET BY MOUTH EVERY DAY Yes Alejandro Ruelas MD   LORATADINE PO Take 5 mg by mouth daily as needed Yes Historical Provider, MD   atorvastatin (LIPITOR) 10 MG tablet TAKE 1 TABLET BY MOUTH EVERY DAY Yes Alejandro Ruelas MD   vitamin B-12 (CYANOCOBALAMIN) 1000 MCG tablet Take 1,000 mcg by mouth daily Pt states he is taking 2500 mcg Yes Historical Provider, MD       History reviewed. No pertinent past medical history. History reviewed. No pertinent surgical history. Family History   Problem Relation Age of Onset    Dementia Mother     Heart Failure Father        CareTeam (Including outside providers/suppliers regularly involved in providing care):   Patient Care Team:  Alejandro Ruelas MD as PCP - General (Family Medicine)  Alejandro Ruelas MD as PCP - [facility] Empaneled Provider    Wt Readings from Last 3 Encounters:   11/15/21 175 lb (79.4 kg)   21 174 lb 11.2 oz (79.2 kg)   20 177 lb (80.3 kg)     There were no vitals filed for this visit. There is no height or weight on file to calculate BMI. Based upon direct observation of the patient, evaluation of cognition reveals recent and remote memory intact.     Patient's complete Health Risk Assessment and screening values have been reviewed and are found in [address] [facility]. The following problems were reviewed today and where indicated follow up appointments were made and/or referrals ordered. Positive Risk Factor Screenings with Interventions:         Substance History:  Social History     Tobacco History     Smoking Status  Current Every Day Smoker Smoking Start Date  5/15/1965 Smoking Frequency  0.5 packs/day for 47 years (27 pk yrs) Smoking Tobacco Type  Cigars    Smokeless Tobacco Use  Never Used          Alcohol History     Alcohol Use Status  Yes          Drug Use     Drug Use Status  No          Sexual Activity     Sexually Active  Not Asked               Alcohol Screening: Audit-C Score: 3  Total Score: 3    A score of 8 or more is associated with harmful or hazardous drinking. A score of 13 or more in women, and 15 or more in men, is likely to indicate alcohol dependence. Substance Abuse Interventions:  · Tobacco abuse:  patient is not ready to work toward tobacco cessation at this time    [address] [facility] and ACP:  General  In general, how would you say your health is?: Very Good  In the past 7 days, have you experienced any of the following?  New or Increased Pain, New or Increased Fatigue, Loneliness, Social Isolation, Stress or Anger?: None of These  Do you get the social and emotional support that you need?: Yes  Do you have a Living Will?: (!) No  Advance Directives     Power of  Living Will ACP-Advance Directive ACP-Power of     Not on File Not on File Not on File Not on File      General Health Risk Interventions:  · No Living Will: in the process of making Living Will        Personalized Preventive Plan   Current Health Maintenance Status  Immunization History   Administered Date(s) Administered    COVID-19, Pena Peter, PF, 30mcg/0.3mL 02/02/2021, 02/23/2021, 10/27/2021    Influenza, High Dose (Fluzone 65 yrs and older) 10/26/2018    Influenza, Quadv, adjuvanted, 65 yrs +, IM, PF (Fluad) 10/02/2020, 11/15/2021    Influenza, Triv, and Response Supplemental Appropriations Act. Patient identification was verified, and a caregiver was present when appropriate. The patient was located in the state of [place] where the provider was credentialed to provide care. Total time spent for this encounter: Not billed by time    --Luci Carrillo LPN on 26/21/8996 at 2:26 PM    An electronic signature was used to authenticate this note. This encounter was performed under myEugenio MDs, direct supervision, 11/17/2021.

## 2022-01-03 DIAGNOSIS — E78.2 MIXED HYPERLIPIDEMIA: ICD-10-CM

## 2022-01-04 RX ORDER — ATORVASTATIN CALCIUM 10 MG/1
TABLET, FILM COATED ORAL
Qty: 90 TABLET | Refills: 1 | Status: SHIPPED | OUTPATIENT
Start: 2022-01-04 | End: 2022-04-07 | Stop reason: SDUPTHER

## 2022-02-03 ENCOUNTER — VIRTUAL VISIT (OUTPATIENT)
Dept: FAMILY MEDICINE CLINIC | Age: 72
End: 2022-02-03
Payer: COMMERCIAL

## 2022-02-03 DIAGNOSIS — M60.811 MYOSITIS OF RIGHT SHOULDER, UNSPECIFIED MYOSITIS TYPE: Primary | ICD-10-CM

## 2022-02-03 PROCEDURE — 99442 PR PHYS/QHP TELEPHONE EVALUATION 11-20 MIN: CPT | Performed by: FAMILY MEDICINE

## 2022-02-03 NOTE — PROGRESS NOTES
Jeancarlos Vasques is a 66 y.o. male evaluated via telephone on 2/3/2022. Consent:  He and/or health care decision maker is aware that that he may receive a bill for this telephone service, which includes applicable co-pays, depending on his insurance coverage, and has provided verbal consent to proceed. Documentation:  I communicated with the patient and/or health care decision maker about about 1 week of right shoulder girdle pain. A lot of it seems to be centered in the right rhomboid group. No definite history of injury reported. Patient does not have much of a history in the past.  Put some heat on it went to urgent care I think he had Depo-Medrol shot and a Medrol Dosepak helped some but still has persistent pain. Does not get a lot of relief with any position change. No associated cough or shortness of breath. We discussed the situation I think this is still symptomatic I would have him switch to ice for 10 to 15 minutes and have aggressive massage. if possible we will also send referral to orthopedic specialist to see if they feel any other work-up or treatment is indicated. 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 Really severe may even need a local injection. .  Otherwise see him back in 3 to 4 months or as needed. At this point I do not believe any refills are needed. Physicians are up-to-date other than Shingrix shots. He has had Covid shots and booster along with flu shot. He will call with questions or problems. Details of this discussion including any medical advice provided as above. I affirm this is a Patient Initiated Episode with a Patient who has not had a related appointment within my department in the past 7 days or scheduled within the next 24 hours.     Patient identification was verified at the start of the visit: Yes    Total Time: minutes: 11-20 minutes    Jeancarlos Yu. was evaluated through a synchronous (real-time) audio encounter. The patient was located at home in a state where the provider was licensed to provide care.     Note: not billable if this call serves to triage the patient into an appointment for the relevant concern      Lissett Goodman MD

## 2022-02-11 ENCOUNTER — OFFICE VISIT (OUTPATIENT)
Dept: ORTHOPEDIC SURGERY | Age: 72
End: 2022-02-11

## 2022-02-11 VITALS
HEART RATE: 75 BPM | WEIGHT: 175 LBS | OXYGEN SATURATION: 98 % | HEIGHT: 67 IN | BODY MASS INDEX: 27.47 KG/M2 | RESPIRATION RATE: 16 BRPM

## 2022-02-11 DIAGNOSIS — M75.81 ROTATOR CUFF TENDINITIS, RIGHT: Primary | ICD-10-CM

## 2022-02-11 PROCEDURE — 99202 OFFICE O/P NEW SF 15 MIN: CPT | Performed by: PHYSICIAN ASSISTANT

## 2022-02-11 RX ORDER — PREDNISONE 20 MG/1
20 TABLET ORAL 2 TIMES DAILY
Qty: 14 TABLET | Refills: 0 | Status: SHIPPED | OUTPATIENT
Start: 2022-02-11 | End: 2022-02-18

## 2022-02-11 NOTE — PATIENT INSTRUCTIONS
Prednisone called into pharmacy. Start taking before your trip. Continue to weight bear as tolerated  Continue range of motion  Ice and elevate as needed  Tylenol or Motrin for pain  If you have anymore worsen pain. Please call back and make an appointment. Patient Education        Rotator Cuff: Exercises  Introduction  Here are some examples of exercises for you to try. The exercises may be suggested for a condition or for rehabilitation. Start each exercise slowly. Ease off the exercises if you start to have pain. You will be told when to start these exercises and which ones will work best for you. How to do the exercises  Pendulum swing    If you have pain in your back, do not do this exercise. 1. Hold on to a table or the back of a chair with your good arm. Then bend forward a little and let your sore arm hang straight down. This exercise does not use the arm muscles. Rather, use your legs and your hips to create movement that makes your arm swing freely. 2. Use the movement from your hips and legs to guide the slightly swinging arm back and forth like a pendulum (or elephant trunk). Then guide it in circles that start small (about the size of a dinner plate). Make the circles a bit larger each day, as your pain allows. 3. Do this exercise for 5 minutes, 5 to 7 times each day. 4. As you have less pain, try bending over a little farther to do this exercise. This will increase the amount of movement at your shoulder. Posterior stretching exercise    1. Hold the elbow of your injured arm with your other hand. 2. Use your hand to pull your injured arm gently up and across your body. You will feel a gentle stretch across the back of your injured shoulder. 3. Hold for at least 15 to 30 seconds. Then slowly lower your arm. 4. Repeat 2 to 4 times.   Up-the-back stretch    Your doctor or physical therapist may want you to wait to do this stretch until you have regained most of your range of motion and strength. You can do this stretch in different ways. Hold any of these stretches for at least 15 to 30 seconds. Repeat them 2 to 4 times. 1. Light stretch: Put your hand in your back pocket. Let it rest there to stretch your shoulder. 2. Moderate stretch: With your other hand, hold your injured arm (palm outward) behind your back by the wrist. Pull your arm up gently to stretch your shoulder. 3. Advanced stretch: Put a towel over your other shoulder. Put the hand of your injured arm behind your back. Now hold the back end of the towel. With the other hand, hold the front end of the towel in front of your body. Pull gently on the front end of the towel. This will bring your hand farther up your back to stretch your shoulder. Overhead stretch    1. Standing about an arm's length away, grasp onto a solid surface. You could use a countertop, a doorknob, or the back of a sturdy chair. 2. With your knees slightly bent, bend forward with your arms straight. Lower your upper body, and let your shoulders stretch. 3. As your shoulders are able to stretch farther, you may need to take a step or two backward. 4. Hold for at least 15 to 30 seconds. Then stand up and relax. If you had stepped back during your stretch, step forward so you can keep your hands on the solid surface. 5. Repeat 2 to 4 times. Shoulder flexion (lying down)    To make a wand for this exercise, use a piece of PVC pipe or a broom handle with the broom removed. Make the wand about a foot wider than your shoulders. 1. Lie on your back, holding a wand with both hands. Your palms should face down as you hold the wand. 2. Keeping your elbows straight, slowly raise your arms over your head. Raise them until you feel a stretch in your shoulders, upper back, and chest.  3. Hold for 15 to 30 seconds. 4. Repeat 2 to 4 times.   Shoulder rotation (lying down)    To make a wand for this exercise, use a piece of PVC pipe or a broom handle with the broom removed. Make the wand about a foot wider than your shoulders. 1. Lie on your back. Hold a wand with both hands with your elbows bent and palms up. 2. Keep your elbows close to your body, and move the wand across your body toward the sore arm. 3. Hold for 8 to 12 seconds. 4. Repeat 2 to 4 times. Wall climbing (to the side)    Avoid any movement that is straight to your side, and be careful not to arch your back. Your arm should stay about 30 degrees to the front of your side. 1. Stand with your side to a wall so that your fingers can just touch it at an angle about 30 degrees toward the front of your body. 2. Walk the fingers of your injured arm up the wall as high as pain permits. Try not to shrug your shoulder up toward your ear as you move your arm up. 3. Hold that position for a count of at least 15 to 20.  4. Walk your fingers back down to the starting position. 5. Repeat at least 2 to 4 times. Try to reach higher each time. Wall climbing (to the front)    During this stretching exercise, be careful not to arch your back. 1. Face a wall, and stand so your fingers can just touch it. 2. Keeping your shoulder down, walk the fingers of your injured arm up the wall as high as pain permits. (Don't shrug your shoulder up toward your ear.)  3. Hold your arm in that position for at least 15 to 30 seconds. 4. Slowly walk your fingers back down to where you started. 5. Repeat at least 2 to 4 times. Try to reach higher each time. Shoulder blade squeeze    1. Stand with your arms at your sides, and squeeze your shoulder blades together. Do not raise your shoulders up as you squeeze. 2. Hold 6 seconds. 3. Repeat 8 to 12 times. Scapular exercise: Arm reach    1. Lie flat on your back. This exercise is a very slight motion that starts with your arms raised (elbows straight, arms straight). 2. From this position, reach higher toward the jason or ceiling. Keep your elbows straight.  All motion should be from your shoulder blade only. 3. Relax your arms back to where you started. 4. Repeat 8 to 12 times. Arm raise to the side    During this strengthening exercise, your arm should stay about 30 degrees to the front of your side. 1. Slowly raise your injured arm to the side, with your thumb facing up. Raise your arm 60 degrees at the most (shoulder level is 90 degrees). 2. Hold the position for 3 to 5 seconds. Then lower your arm back to your side. If you need to, bring your \"good\" arm across your body and place it under the elbow as you lower your injured arm. Use your good arm to keep your injured arm from dropping down too fast.  3. Repeat 8 to 12 times. 4. When you first start out, don't hold any extra weight in your hand. As you get stronger, you may use a 1-pound to 2-pound dumbbell or a small can of food. Shoulder flexor and extensor exercise    These are isometric exercises. That means you contract your muscles without actually moving. 1. Push forward (flex): Stand facing a wall or doorjamb, about 6 inches or less back. Hold your injured arm against your body. Make a closed fist with your thumb on top. Then gently push your hand forward into the wall with about 25% to 50% of your strength. Don't let your body move backward as you push. Hold for about 6 seconds. Relax for a few seconds. Repeat 8 to 12 times. 2. Push backward (extend): Stand with your back flat against a wall. Your upper arm should be against the wall, with your elbow bent 90 degrees (your hand straight ahead). Push your elbow gently back against the wall with about 25% to 50% of your strength. Don't let your body move forward as you push. Hold for about 6 seconds. Relax for a few seconds. Repeat 8 to 12 times. Scapular exercise: Wall push-ups    This exercise is best done with your fingers somewhat turned out, rather than straight up and down. 1. Stand facing a wall, about 12 inches to 18 inches away.   2. Place your hands on the wall at shoulder height. 3. Slowly bend your elbows and bring your face to the wall. Keep your back and hips straight. 4. Push back to where you started. 5. Repeat 8 to 12 times. 6. When you can do this exercise against a wall comfortably, you can try it against a counter. You can then slowly progress to the end of a couch, then to a sturdy chair, and finally to the floor. Scapular exercise: Retraction    For this exercise, you will need elastic exercise material, such as surgical tubing or Thera-Band. 1. Put the band around a solid object at about waist level. (A bedpost will work well.) Each hand should hold an end of the band. 2. With your elbows at your sides and bent to 90 degrees, pull the band back. Your shoulder blades should move toward each other. Then move your arms back where you started. 3. Repeat 8 to 12 times. 4. If you have good range of motion in your shoulders, try this exercise with your arms lifted out to the sides. Keep your elbows at a 90-degree angle. Raise the elastic band up to about shoulder level. Pull the band back to move your shoulder blades toward each other. Then move your arms back where you started. Internal rotator strengthening exercise    1. Start by tying a piece of elastic exercise material to a doorknob. You can use surgical tubing or Thera-Band. 2. Stand or sit with your shoulder relaxed and your elbow bent 90 degrees. Your upper arm should rest comfortably against your side. Squeeze a rolled towel between your elbow and your body for comfort. This will help keep your arm at your side. 3. Hold one end of the elastic band in the hand of the painful arm. 4. Slowly rotate your forearm toward your body until it touches your belly. Slowly move it back to where you started. 5. Keep your elbow and upper arm firmly tucked against the towel roll or at your side. 6. Repeat 8 to 12 times. External rotator strengthening exercise    1.  Start by tying a piece of elastic exercise material to a doorknob. You can use surgical tubing or Thera-Band. (You may also hold one end of the band in each hand.)  2. Stand or sit with your shoulder relaxed and your elbow bent 90 degrees. Your upper arm should rest comfortably against your side. Squeeze a rolled towel between your elbow and your body for comfort. This will help keep your arm at your side. 3. Hold one end of the elastic band with the hand of the painful arm. 4. Start with your forearm across your belly. Slowly rotate the forearm out away from your body. Keep your elbow and upper arm tucked against the towel roll or the side of your body until you begin to feel tightness in your shoulder. Slowly move your arm back to where you started. 5. Repeat 8 to 12 times. Follow-up care is a key part of your treatment and safety. Be sure to make and go to all appointments, and call your doctor if you are having problems. It's also a good idea to know your test results and keep a list of the medicines you take. Where can you learn more? Go to https://[employer]. org and sign in to your [employer] account. Enter Lorenamikebecka Hank in the [facility] box to learn more about \"Rotator Cuff: Exercises. \"     If you do not have an account, please click on the \"Sign Up Now\" link. Current as of: July 1, 2021               Content Version: 13.1  © 2006-2021 [employer]. Care instructions adapted under license by [facility] ([facility]). If you have questions about a medical condition or this instruction, always ask your healthcare professional. [address] any warranty or liability for your use of this information.

## 2022-02-11 NOTE — PROGRESS NOTES
Review of Systems   Constitutional: Negative. HENT: Negative. Eyes: Negative. Respiratory: Negative. Cardiovascular: Negative. Gastrointestinal: Negative. Genitourinary: Negative. Musculoskeletal: Positive for arthralgias and myalgias. Skin: Negative. Neurological: Negative. Psychiatric/Behavioral: Negative. Vane Canada is a 66 y.o. male who comes then to have his right shoulder evaluated. He states that prior to coming in today he was having more significant pain in the right shoulder with no known injury but it has slowly gotten better. Today he rates his pain at a 2 or 3/10. He did see his family doctor or at least virtually saw his family doctor regarding this pain. He also did go to an urgent care and had an intramuscular injection and also a Medrol Dosepak. He states that that medication actually has helped some. He is just concerned because he is getting ready to go on a trip and the last time he went out of town on a trip he had a flareup of some arthritis and was unable to continue doing activities on the trip. Patient was referred by  Jez Bhat, *  For right shoulder pain    History reviewed. No pertinent past medical history. History reviewed. No pertinent surgical history. Family History   Problem Relation Age of Onset    Dementia Mother     Heart Failure Father        Social History     Socioeconomic History    Marital status:      Spouse name: None    Number of children: 2    Years of education: None    Highest education level: None   Occupational History     Comment: Retired   Tobacco Use    Smoking status: Current Every Day Smoker     Packs/day: 0.50     Years: 54.00     Pack years: 27.00     Types: Cigars     Start date: 5/15/1965    Smokeless tobacco: Never Used   Substance and Sexual Activity    Alcohol use:  Yes    Drug use: No    Sexual activity: None   Other Topics Concern    None   Social History Narrative    None     Social Determinants of Health     Financial Resource Strain: Low Risk     Difficulty of Paying Living Expenses: Not hard at all   Food Insecurity: No Food Insecurity    Worried About Running Out of Food in the Last Year: Never true    Gaby of Food in the Last Year: Never true   Transportation Needs:     Lack of Transportation (Medical): Not on file    Lack of Transportation (Non-Medical): Not on file   Physical Activity:     Days of Exercise per Week: Not on file    Minutes of Exercise per Session: Not on file   Stress:     Feeling of Stress : Not on file   Social Connections:     Frequency of Communication with Friends and Family: Not on file    Frequency of Social Gatherings with Friends and Family: Not on file    Attends [employer] Services: Not on file    Active Member of [address] [place] [address] [place] or Organizations: Not on file    Attends Club or Organization Meetings: Not on file    Marital Status: Not on file   Intimate Partner Violence:     Fear of Current or Ex-Partner: Not on file    Emotionally Abused: Not on file    Physically Abused: Not on file    Sexually Abused: Not on file   Housing Stability:     Unable to Pay for Housing in the Last Year: Not on file    Number of [place] in the Last Year: Not on file    Unstable Housing in the Last Year: Not on file       Current Outpatient Medications   Medication Sig Dispense Refill    atorvastatin (LIPITOR) 10 MG tablet TAKE 1 TABLET BY MOUTH EVERY DAY 90 tablet 1    lisinopril (PRINIVIL;ZESTRIL) 30 MG tablet TAKE 1 TABLET BY MOUTH EVERY DAY 90 tablet 1    LORATADINE PO Take 5 mg by mouth daily as needed      vitamin B-12 (CYANOCOBALAMIN) 1000 MCG tablet Take 1,000 mcg by mouth daily Pt states he is taking 2500 mcg       No current facility-administered medications for this visit.        No Known Allergies    Review of Systems:  See above      Physical Exam:   Pulse 75   Resp 16   Ht 5' 7\" (1.702 m)   Wt 175 lb (79.4 kg) SpO2 98%   BMI 27.41 kg/m²        Gait is Normal.     Gen/Psych:Examination reveals a pleasant individual in no acute distress. The patient is oriented to time, place and person. The patient's mood and affect are appropriate. Patient appears well nourished. Body habitus is normal    Head: Head is atraumatic normocephalic,  ears are symmetric,     Eyes: Eyes show equal pupils bilaterally, extraocular muscles intact    Ears:  Hearing is intact to normal voice at 5 feet    Nose: Nares are patent bilaterally, no epistaxis,no rhinorrhea     Lymph:  No obvious lymphedema in bilateral upper extremities     Skin intact in bilateral upper extremities with no ulcerations, lesions, rash, erythema. Vascular: There are no varicosities in bilateral upper  extremities, sensation intact to light touch over bilateral upper extremities. Right shoulder exam:  Skin:  Clear with no erythema, there is no significant joint effusion  Deformity:  none  Atrophy:  none  Tenderness:  posterior acromial, trapezius muscle  Active ROM:   FE:180    IR side: L2           ER side: 40     Strength: FE:5-/5     ER:5/5 IR:5/5      Elbow flexion: 5/5  Neer:  mildly positive  Costa:  mildly positive  Yergason:not positive  Dwight:  not positive              Outsiderecord review: Office notes reviewed. Imaging studies:  X-rays of the right shoulder were taken and reviewed in the office today. X-rays show mild degenerative changes within the [facility] joint and within the glenohumeral joint. There are no acute fractures or dislocations. The official read and interpretation of these x-rays will be done by the the [facility]         Impression:     Diagnosis Orders   1. Rotator cuff tendinitis, right             Plan: At this point given the fact that he is improving I do not want to put an injection in his shoulder but should he have a flareup of the shoulder while he is on his trip I did give him oral steroid to take. Should his shoulder pain be more persistent then I will have him come back and get a local injection in the subacromial space. Patient Instructions   Prednisone called into pharmacy. Start taking before your trip. Continue to weight bear as tolerated  Continue range of motion  Ice and elevate as needed  Tylenol or Motrin for pain  If you have anymore worsen pain. Please call back and make an appointment. Patient Education        Rotator Cuff: Exercises  Introduction  Here are some examples of exercises for you to try. The exercises may be suggested for a condition or for rehabilitation. Start each exercise slowly. Ease off the exercises if you start to have pain. You will be told when to start these exercises and which ones will work best for you. How to do the exercises  Pendulum swing    If you have pain in your back, do not do this exercise. 1. Hold on to a table or the back of a chair with your good arm. Then bend forward a little and let your sore arm hang straight down. This exercise does not use the arm muscles. Rather, use your legs and your hips to create movement that makes your arm swing freely. 2. Use the movement from your hips and legs to guide the slightly swinging arm back and forth like a pendulum (or elephant trunk). Then guide it in circles that start small (about the size of a dinner plate). Make the circles a bit larger each day, as your pain allows. 3. Do this exercise for 5 minutes, 5 to 7 times each day. 4. As you have less pain, try bending over a little farther to do this exercise. This will increase the amount of movement at your shoulder. Posterior stretching exercise    1. Hold the elbow of your injured arm with your other hand. 2. Use your hand to pull your injured arm gently up and across your body. You will feel a gentle stretch across the back of your injured shoulder. 3. Hold for at least 15 to 30 seconds. Then slowly lower your arm.   4. Repeat 2 to 4 times. Up-the-back stretch    Your doctor or physical therapist may want you to wait to do this stretch until you have regained most of your range of motion and strength. You can do this stretch in different ways. Hold any of these stretches for at least 15 to 30 seconds. Repeat them 2 to 4 times. 1. Light stretch: Put your hand in your back pocket. Let it rest there to stretch your shoulder. 2. Moderate stretch: With your other hand, hold your injured arm (palm outward) behind your back by the wrist. Pull your arm up gently to stretch your shoulder. 3. Advanced stretch: Put a towel over your other shoulder. Put the hand of your injured arm behind your back. Now hold the back end of the towel. With the other hand, hold the front end of the towel in front of your body. Pull gently on the front end of the towel. This will bring your hand farther up your back to stretch your shoulder. Overhead stretch    1. Standing about an arm's length away, grasp onto a solid surface. You could use a countertop, a doorknob, or the back of a sturdy chair. 2. With your knees slightly bent, bend forward with your arms straight. Lower your upper body, and let your shoulders stretch. 3. As your shoulders are able to stretch farther, you may need to take a step or two backward. 4. Hold for at least 15 to 30 seconds. Then stand up and relax. If you had stepped back during your stretch, step forward so you can keep your hands on the solid surface. 5. Repeat 2 to 4 times. Shoulder flexion (lying down)    To make a wand for this exercise, use a piece of PVC pipe or a broom handle with the broom removed. Make the wand about a foot wider than your shoulders. 1. Lie on your back, holding a wand with both hands. Your palms should face down as you hold the wand. 2. Keeping your elbows straight, slowly raise your arms over your head.  Raise them until you feel a stretch in your shoulders, upper back, and chest.  3. Hold for 15 to 30 seconds. 4. Repeat 2 to 4 times. Shoulder rotation (lying down)    To make a wand for this exercise, use a piece of PVC pipe or a broom handle with the broom removed. Make the wand about a foot wider than your shoulders. 1. Lie on your back. Hold a wand with both hands with your elbows bent and palms up. 2. Keep your elbows close to your body, and move the wand across your body toward the sore arm. 3. Hold for 8 to 12 seconds. 4. Repeat 2 to 4 times. Wall climbing (to the side)    Avoid any movement that is straight to your side, and be careful not to arch your back. Your arm should stay about 30 degrees to the front of your side. 1. Stand with your side to a wall so that your fingers can just touch it at an angle about 30 degrees toward the front of your body. 2. Walk the fingers of your injured arm up the wall as high as pain permits. Try not to shrug your shoulder up toward your ear as you move your arm up. 3. Hold that position for a count of at least 15 to 20.  4. Walk your fingers back down to the starting position. 5. Repeat at least 2 to 4 times. Try to reach higher each time. Wall climbing (to the front)    During this stretching exercise, be careful not to arch your back. 1. Face a wall, and stand so your fingers can just touch it. 2. Keeping your shoulder down, walk the fingers of your injured arm up the wall as high as pain permits. (Don't shrug your shoulder up toward your ear.)  3. Hold your arm in that position for at least 15 to 30 seconds. 4. Slowly walk your fingers back down to where you started. 5. Repeat at least 2 to 4 times. Try to reach higher each time. Shoulder blade squeeze    1. Stand with your arms at your sides, and squeeze your shoulder blades together. Do not raise your shoulders up as you squeeze. 2. Hold 6 seconds. 3. Repeat 8 to 12 times. Scapular exercise: Arm reach    1. Lie flat on your back.  This exercise is a very slight motion that starts with your arms raised (elbows straight, arms straight). 2. From this position, reach higher toward the jason or ceiling. Keep your elbows straight. All motion should be from your shoulder blade only. 3. Relax your arms back to where you started. 4. Repeat 8 to 12 times. Arm raise to the side    During this strengthening exercise, your arm should stay about 30 degrees to the front of your side. 1. Slowly raise your injured arm to the side, with your thumb facing up. Raise your arm 60 degrees at the most (shoulder level is 90 degrees). 2. Hold the position for 3 to 5 seconds. Then lower your arm back to your side. If you need to, bring your \"good\" arm across your body and place it under the elbow as you lower your injured arm. Use your good arm to keep your injured arm from dropping down too fast.  3. Repeat 8 to 12 times. 4. When you first start out, don't hold any extra weight in your hand. As you get stronger, you may use a 1-pound to 2-pound dumbbell or a small can of food. Shoulder flexor and extensor exercise    These are isometric exercises. That means you contract your muscles without actually moving. 1. Push forward (flex): Stand facing a wall or doorjamb, about 6 inches or less back. Hold your injured arm against your body. Make a closed fist with your thumb on top. Then gently push your hand forward into the wall with about 25% to 50% of your strength. Don't let your body move backward as you push. Hold for about 6 seconds. Relax for a few seconds. Repeat 8 to 12 times. 2. Push backward (extend): Stand with your back flat against a wall. Your upper arm should be against the wall, with your elbow bent 90 degrees (your hand straight ahead). Push your elbow gently back against the wall with about 25% to 50% of your strength. Don't let your body move forward as you push. Hold for about 6 seconds. Relax for a few seconds. Repeat 8 to 12 times.   Scapular exercise: Wall push-ups    This exercise is best done with your fingers somewhat turned out, rather than straight up and down. 1. Stand facing a wall, about 12 inches to 18 inches away. 2. Place your hands on the wall at shoulder height. 3. Slowly bend your elbows and bring your face to the wall. Keep your back and hips straight. 4. Push back to where you started. 5. Repeat 8 to 12 times. 6. When you can do this exercise against a wall comfortably, you can try it against a counter. You can then slowly progress to the end of a couch, then to a sturdy chair, and finally to the floor. Scapular exercise: Retraction    For this exercise, you will need elastic exercise material, such as surgical tubing or Thera-Band. 1. Put the band around a solid object at about waist level. (A bedpost will work well.) Each hand should hold an end of the band. 2. With your elbows at your sides and bent to 90 degrees, pull the band back. Your shoulder blades should move toward each other. Then move your arms back where you started. 3. Repeat 8 to 12 times. 4. If you have good range of motion in your shoulders, try this exercise with your arms lifted out to the sides. Keep your elbows at a 90-degree angle. Raise the elastic band up to about shoulder level. Pull the band back to move your shoulder blades toward each other. Then move your arms back where you started. Internal rotator strengthening exercise    1. Start by tying a piece of elastic exercise material to a doorknob. You can use surgical tubing or Thera-Band. 2. Stand or sit with your shoulder relaxed and your elbow bent 90 degrees. Your upper arm should rest comfortably against your side. Squeeze a rolled towel between your elbow and your body for comfort. This will help keep your arm at your side. 3. Hold one end of the elastic band in the hand of the painful arm. 4. Slowly rotate your forearm toward your body until it touches your belly. Slowly move it back to where you started.   5. Keep your elbow and upper arm firmly tucked against the towel roll or at your side. 6. Repeat 8 to 12 times. External rotator strengthening exercise    1. Start by tying a piece of elastic exercise material to a doorknob. You can use surgical tubing or Thera-Band. (You may also hold one end of the band in each hand.)  2. Stand or sit with your shoulder relaxed and your elbow bent 90 degrees. Your upper arm should rest comfortably against your side. Squeeze a rolled towel between your elbow and your body for comfort. This will help keep your arm at your side. 3. Hold one end of the elastic band with the hand of the painful arm. 4. Start with your forearm across your belly. Slowly rotate the forearm out away from your body. Keep your elbow and upper arm tucked against the towel roll or the side of your body until you begin to feel tightness in your shoulder. Slowly move your arm back to where you started. 5. Repeat 8 to 12 times. Follow-up care is a key part of your treatment and safety. Be sure to make and go to all appointments, and call your doctor if you are having problems. It's also a good idea to know your test results and keep a list of the medicines you take. Where can you learn more? Go to https://[employer].Medlert. org and sign in to your [employer] account. Enter Hemant Flores in the [facility] box to learn more about \"Rotator Cuff: Exercises. \"     If you do not have an account, please click on the \"Sign Up Now\" link. Current as of: July 1, 2021               Content Version: 13.1  © 2006-2021 [employer]. Care instructions adapted under license by [facility] ([facility]). If you have questions about a medical condition or this instruction, always ask your healthcare professional. [place] [address] any warranty or liability for your use of this information.                      Occupation: [employer] [facility] [employer]  Dominant Hand: Right

## 2022-02-24 ASSESSMENT — ENCOUNTER SYMPTOMS
RESPIRATORY NEGATIVE: 1
EYES NEGATIVE: 1
GASTROINTESTINAL NEGATIVE: 1

## 2022-04-07 DIAGNOSIS — E78.2 MIXED HYPERLIPIDEMIA: ICD-10-CM

## 2022-04-08 RX ORDER — ATORVASTATIN CALCIUM 10 MG/1
10 TABLET, FILM COATED ORAL DAILY
Qty: 90 TABLET | Refills: 1 | Status: SHIPPED | OUTPATIENT
Start: 2022-04-08 | End: 2022-10-07

## 2022-05-08 DIAGNOSIS — I10 ESSENTIAL HYPERTENSION: ICD-10-CM

## 2022-05-09 RX ORDER — LISINOPRIL 30 MG/1
TABLET ORAL
Qty: 90 TABLET | Refills: 1 | Status: SHIPPED | OUTPATIENT
Start: 2022-05-09

## 2022-05-16 ENCOUNTER — OFFICE VISIT (OUTPATIENT)
Dept: FAMILY MEDICINE CLINIC | Age: 72
End: 2022-05-16
Payer: COMMERCIAL

## 2022-05-16 VITALS
HEART RATE: 59 BPM | WEIGHT: 176.8 LBS | BODY MASS INDEX: 27.75 KG/M2 | OXYGEN SATURATION: 100 % | HEIGHT: 67 IN | SYSTOLIC BLOOD PRESSURE: 128 MMHG | DIASTOLIC BLOOD PRESSURE: 68 MMHG

## 2022-05-16 DIAGNOSIS — M25.511 CHRONIC PAIN OF BOTH SHOULDERS: ICD-10-CM

## 2022-05-16 DIAGNOSIS — J30.9 ALLERGIC RHINITIS, UNSPECIFIED SEASONALITY, UNSPECIFIED TRIGGER: ICD-10-CM

## 2022-05-16 DIAGNOSIS — I10 ESSENTIAL HYPERTENSION: Primary | ICD-10-CM

## 2022-05-16 DIAGNOSIS — G89.29 CHRONIC MIDLINE LOW BACK PAIN WITHOUT SCIATICA: ICD-10-CM

## 2022-05-16 DIAGNOSIS — E87.5 HYPERKALEMIA: ICD-10-CM

## 2022-05-16 DIAGNOSIS — E78.2 MIXED HYPERLIPIDEMIA: ICD-10-CM

## 2022-05-16 DIAGNOSIS — M25.512 CHRONIC PAIN OF BOTH SHOULDERS: ICD-10-CM

## 2022-05-16 DIAGNOSIS — I10 ESSENTIAL HYPERTENSION: ICD-10-CM

## 2022-05-16 DIAGNOSIS — M54.50 CHRONIC MIDLINE LOW BACK PAIN WITHOUT SCIATICA: ICD-10-CM

## 2022-05-16 DIAGNOSIS — G89.29 CHRONIC PAIN OF BOTH SHOULDERS: ICD-10-CM

## 2022-05-16 LAB
A/G RATIO: 1.5 (ref 1.1–2.2)
ALBUMIN SERPL-MCNC: 4.3 G/DL (ref 3.4–5)
ALP BLD-CCNC: 68 U/L (ref 40–129)
ALT SERPL-CCNC: 16 U/L (ref 10–40)
ANION GAP SERPL CALCULATED.3IONS-SCNC: 11 MMOL/L (ref 3–16)
AST SERPL-CCNC: 15 U/L (ref 15–37)
BILIRUB SERPL-MCNC: 0.5 MG/DL (ref 0–1)
BUN BLDV-MCNC: 14 MG/DL (ref 7–20)
CALCIUM SERPL-MCNC: 9.9 MG/DL (ref 8.3–10.6)
CHLORIDE BLD-SCNC: 101 MMOL/L (ref 99–110)
CHOLESTEROL, TOTAL: 174 MG/DL (ref 0–199)
CO2: 27 MMOL/L (ref 21–32)
CREAT SERPL-MCNC: 1.1 MG/DL (ref 0.8–1.3)
GFR AFRICAN AMERICAN: >60
GFR NON-AFRICAN AMERICAN: >60
GLUCOSE BLD-MCNC: 85 MG/DL (ref 70–99)
HCT VFR BLD CALC: 45.2 % (ref 40.5–52.5)
HDLC SERPL-MCNC: 63 MG/DL (ref 40–60)
HEMOGLOBIN: 14.8 G/DL (ref 13.5–17.5)
LDL CHOLESTEROL CALCULATED: 90 MG/DL
MCH RBC QN AUTO: 30.3 PG (ref 26–34)
MCHC RBC AUTO-ENTMCNC: 32.6 G/DL (ref 31–36)
MCV RBC AUTO: 92.7 FL (ref 80–100)
PDW BLD-RTO: 13.7 % (ref 12.4–15.4)
PLATELET # BLD: 214 K/UL (ref 135–450)
PMV BLD AUTO: 8.3 FL (ref 5–10.5)
POTASSIUM SERPL-SCNC: 5.1 MMOL/L (ref 3.5–5.1)
RBC # BLD: 4.88 M/UL (ref 4.2–5.9)
SODIUM BLD-SCNC: 139 MMOL/L (ref 136–145)
TOTAL PROTEIN: 7.1 G/DL (ref 6.4–8.2)
TRIGL SERPL-MCNC: 104 MG/DL (ref 0–150)
VLDLC SERPL CALC-MCNC: 21 MG/DL
WBC # BLD: 8.4 K/UL (ref 4–11)

## 2022-05-16 PROCEDURE — 99214 OFFICE O/P EST MOD 30 MIN: CPT | Performed by: FAMILY MEDICINE

## 2022-05-16 ASSESSMENT — ENCOUNTER SYMPTOMS
NAUSEA: 0
ABDOMINAL PAIN: 0
EYE PAIN: 0
DIARRHEA: 0
BLOOD IN STOOL: 0
SHORTNESS OF BREATH: 0
BACK PAIN: 1
TROUBLE SWALLOWING: 0
VOMITING: 0
WHEEZING: 0
CHEST TIGHTNESS: 0

## 2022-05-16 NOTE — PROGRESS NOTES
5/16/2022    [address] [place] [address] Chief Complaint   Patient presents with    6 Month Follow-Up    Shoulder Pain     Shoulder pain. Left worse. X 2 weeks ago began hurting bad. Takes prednisone as needed.  Back Pain     Back pain still. HPI  History was obtained from the patient. Babita Kim is a 66 y.o. male who presents today with follow-up on hypertension, allergies, hyperlipidemia, and chronic low back pain. He saw orthopedics for shoulder pain took a burst of steroids when he was on a golf trip that seemed to help still have a little bit of bilateral shoulder pain now. Patient is being good on potassium in his diet. He has had COVID shot x2 and a COVID booster x1 back pain still comes and goes a little bit. Flared currently he is trying to golf about 2 times / week. Denies mood disturbance. Allergies are mild meds are overall well-tolerated. On review he will need labs and COVID booster    REVIEW OF SYMPTOMS    Review of Systems   Constitutional: Negative for activity change and fatigue. HENT: Negative for congestion, hearing loss, mouth sores and trouble swallowing. Eyes: Negative for pain and visual disturbance. Respiratory: Negative for chest tightness, shortness of breath and wheezing. Cardiovascular: Negative for chest pain and palpitations. Gastrointestinal: Negative for abdominal pain, blood in stool, diarrhea, nausea and vomiting. Genitourinary: Negative for dysuria, frequency and urgency. Musculoskeletal: Positive for arthralgias and back pain. Negative for gait problem and neck stiffness. Skin: Negative for rash. Allergic/Immunologic: Positive for environmental allergies. Neurological: Negative for dizziness, seizures, speech difficulty and weakness. Hematological: Does not bruise/bleed easily. Psychiatric/Behavioral: Negative for agitation, confusion, dysphoric mood, hallucinations and suicidal ideas. The patient is not nervous/anxious.         PAST MEDICAL HISTORY  No past medical history on file. FAMILY HISTORY  Family History   Problem Relation Age of Onset    Dementia Mother     Heart Failure Father        SOCIAL HISTORY  Social History     Socioeconomic History    Marital status:      Spouse name: Not on file    Number of children: 2    Years of education: Not on file    Highest education level: Not on file   Occupational History     Comment: Retired   Tobacco Use    Smoking status: Current Every Day Smoker     Packs/day: 0.50     Years: 54.00     Pack years: 27.00     Types: Cigars     Start date: 5/15/1965    Smokeless tobacco: Never Used   Substance and Sexual Activity    Alcohol use: Yes    Drug use: No    Sexual activity: Not on file   Other Topics Concern    Not on file   Social History Narrative    Not on file     Social Determinants of Health     Financial Resource Strain: Low Risk     Difficulty of Paying Living Expenses: Not hard at all   Food Insecurity: No Food Insecurity    Worried About Running Out of Food in the Last Year: Never true    [address] in the Last Year: Never true   Transportation Needs:     Lack of Transportation (Medical): Not on file    Lack of Transportation (Non-Medical):  Not on file   Physical Activity:     Days of Exercise per Week: Not on file    Minutes of Exercise per Session: Not on file   Stress:     Feeling of Stress : Not on file   Social Connections:     Frequency of Communication with Friends and Family: Not on file    Frequency of Social Gatherings with Friends and Family: Not on file    Attends Restoration Services: Not on file    Active Member of Clubs or Organizations: Not on file    Attends Club or Organization Meetings: Not on file    Marital Status: Not on file   Intimate Partner Violence:     Fear of Current or Ex-Partner: Not on file    Emotionally Abused: Not on file    Physically Abused: Not on file    Sexually Abused: Not on file   Housing Stability:     Unable to Pay for Housing in the Last Year: Not on file    Number of Places Lived in the Last Year: Not on file    Unstable Housing in the Last Year: Not on file        SURGICAL HISTORY  No past surgical history on file. CURRENT MEDICATIONS  Current Outpatient Medications   Medication Sig Dispense Refill    lisinopril (PRINIVIL;ZESTRIL) 30 MG tablet TAKE 1 TABLET BY MOUTH EVERY DAY 90 tablet 1    atorvastatin (LIPITOR) 10 MG tablet Take 1 tablet by mouth daily TAKE 1 TABLET BY MOUTH EVERY DAY 90 tablet 1    LORATADINE PO Take 5 mg by mouth daily as needed      vitamin B-12 (CYANOCOBALAMIN) 1000 MCG tablet Take 1,000 mcg by mouth daily Pt states he is taking 2500 mcg       No current facility-administered medications for this visit. ALLERGIES  Allergies   Allergen Reactions    Seasonal        PHYSICAL EXAM    /68 (Site: Right Upper Arm, Position: Sitting, Cuff Size: Medium Adult)   Pulse 59   Ht 5' 7\" (1.702 m)   Wt 176 lb 12.8 oz (80.2 kg)   SpO2 100%   BMI 27.69 kg/m²     Physical Exam  Vitals and nursing note reviewed. Constitutional:       General: He is not in acute distress. Appearance: Normal appearance. He is well-developed. He is not ill-appearing or toxic-appearing. HENT:      Head: Normocephalic and atraumatic. Nose: Nose normal.      Mouth/Throat:      Mouth: Mucous membranes are moist.      Pharynx: Oropharynx is clear. Eyes:      Pupils: Pupils are equal, round, and reactive to light. Cardiovascular:      Rate and Rhythm: Normal rate and regular rhythm. Heart sounds: Normal heart sounds. No murmur heard. No gallop. Pulmonary:      Effort: Pulmonary effort is normal. No respiratory distress. Breath sounds: Normal breath sounds. No wheezing or rales. Abdominal:      Palpations: Abdomen is soft. Musculoskeletal:         General: No deformity. Normal range of motion. Cervical back: Normal range of motion and neck supple. No rigidity. Comments: Patient with current back pain and bilateral shoulder pain with limited range of motion. Especially painful with elevation external rotation of shoulders   Lymphadenopathy:      Cervical: No cervical adenopathy. Skin:     General: Skin is warm and dry. Coloration: Skin is not jaundiced. Findings: No bruising or lesion. Neurological:      General: No focal deficit present. Mental Status: He is alert and oriented to person, place, and time. Mental status is at baseline. Cranial Nerves: No cranial nerve deficit. Motor: No weakness. Gait: Gait normal.   Psychiatric:         Mood and Affect: Mood normal.         Behavior: Behavior normal.         Thought Content: Thought content normal.         ASSESSMENT & PLAN     Diagnosis Orders   1. Essential hypertension  CBC    Comprehensive Metabolic Panel   2. Mixed hyperlipidemia  LIPID PANEL   3. Allergic rhinitis, unspecified seasonality, unspecified trigger     4. Chronic midline low back pain without sciatica     5. Hyperkalemia     6. Chronic pain of both shoulders     Advised that the patient get COVID booster in near future. We will check CBC, CMP and lipid panel-he is to follow-up for these results. He was given back stretches to do ,alternate ice with heat to area of pain,and he is to use the prednisone that he has at home and an as-needed basis if shoulder pain persists he is to recheck with orthopedics. Return in about 6 months (around 11/16/2022).          Electronically signed by Alejandra Rebollar MD on 5/16/2022

## 2022-05-23 ENCOUNTER — OFFICE VISIT (OUTPATIENT)
Dept: ORTHOPEDIC SURGERY | Age: 72
End: 2022-05-23
Payer: COMMERCIAL

## 2022-05-23 VITALS
RESPIRATION RATE: 16 BRPM | DIASTOLIC BLOOD PRESSURE: 66 MMHG | BODY MASS INDEX: 27.5 KG/M2 | SYSTOLIC BLOOD PRESSURE: 149 MMHG | HEIGHT: 67 IN | WEIGHT: 175.2 LBS | HEART RATE: 65 BPM | OXYGEN SATURATION: 97 %

## 2022-05-23 DIAGNOSIS — M75.42 ROTATOR CUFF IMPINGEMENT SYNDROME OF LEFT SHOULDER: Primary | ICD-10-CM

## 2022-05-23 PROCEDURE — 20610 DRAIN/INJ JOINT/BURSA W/O US: CPT | Performed by: PHYSICIAN ASSISTANT

## 2022-05-23 PROCEDURE — 99212 OFFICE O/P EST SF 10 MIN: CPT | Performed by: PHYSICIAN ASSISTANT

## 2022-05-23 ASSESSMENT — ENCOUNTER SYMPTOMS
GASTROINTESTINAL NEGATIVE: 1
EYES NEGATIVE: 1
RESPIRATORY NEGATIVE: 1

## 2022-05-23 NOTE — PROGRESS NOTES
[facility]      HPI:  Moira Jain is a 66 y.o. male that presents the office today complaining of left shoulder pain. He has been seen in this office before for his right shoulder back in February and at that time he was already improving so he did not get a steroid injection. He got an oral steroid to take just in case it flared up on him while he was on vacation. He states that the left shoulder has began to hurt and he rates his pain today at a 3/10 but with motion he can get up to 8/10. I reviewed and agree with the portions of the HPI, review of systems, vital documentation and plan performed by my staff and have added/addended where appropriate. No past medical history on file. No past surgical history on file. Family History   Problem Relation Age of Onset    Dementia Mother     Heart Failure Father        Social History     Socioeconomic History    Marital status:      Spouse name: None    Number of children: 2    Years of education: None    Highest education level: None   Occupational History     Comment: Retired   Tobacco Use    Smoking status: Current Every Day Smoker     Packs/day: 0.50     Years: 54.00     Pack years: 27.00     Types: Cigars     Start date: 5/15/1965    Smokeless tobacco: Never Used   Substance and Sexual Activity    Alcohol use: Yes    Drug use: No    Sexual activity: None   Other Topics Concern    None   Social History Narrative    None     Social Determinants of Health     Financial Resource Strain: Low Risk     Difficulty of Paying Living Expenses: Not hard at all   Food Insecurity: No Food Insecurity    Worried About Running Out of Food in the Last Year: Never true    Gaby of Food in the Last Year: Never true   Transportation Needs:     Lack of Transportation (Medical): Not on file    Lack of Transportation (Non-Medical):  Not on file   Physical Activity:     Days of Exercise per Week: Not on file  Minutes of Exercise per Session: Not on file   Stress:     Feeling of Stress : Not on file   Social Connections:     Frequency of Communication with Friends and Family: Not on file    Frequency of Social Gatherings with Friends and Family: Not on file    Attends Qu[employer] Services: Not on file    Active Member of [address] [place] [address] or Organizations: Not on file    Attends Club or Organization Meetings: Not on file    Marital Status: Not on file   Intimate Partner Violence:     Fear of Current or Ex-Partner: Not on file    Emotionally Abused: Not on file    Physically Abused: Not on file    Sexually Abused: Not on file   Housing Stability:     Unable to Pay for Housing in the Last Year: Not on file    Number of [place] in the Last Year: Not on file    Unstable Housing in the Last Year: Not on file       Current Outpatient Medications   Medication Sig Dispense Refill    lisinopril (PRINIVIL;ZESTRIL) 30 MG tablet TAKE 1 TABLET BY MOUTH EVERY DAY 90 tablet 1    atorvastatin (LIPITOR) 10 MG tablet Take 1 tablet by mouth daily TAKE 1 TABLET BY MOUTH EVERY DAY 90 tablet 1    LORATADINE PO Take 5 mg by mouth daily as needed      vitamin B-12 (CYANOCOBALAMIN) 1000 MCG tablet Take 1,000 mcg by mouth daily Pt states he is taking 2500 mcg       No current facility-administered medications for this visit. Allergies   Allergen Reactions    Seasonal        Vitals:    05/23/22 1417   BP: (!) 149/66   Pulse: 65   Resp: 16   SpO2: 97%   Weight: 175 lb 3.2 oz (79.5 kg)   Height: 5' 7\" (1.702 m)         Review of Systems:   Review of Systems   Constitutional: Negative. HENT: Negative. Eyes: Negative. Respiratory: Negative. Cardiovascular: Negative. Gastrointestinal: Negative. Genitourinary: Negative. Musculoskeletal: Positive for arthralgias and myalgias. Skin: Negative. Neurological: Negative. Psychiatric/Behavioral: Negative.            Physical Exam:   Gen/Psych:Examination reveals a pleasant individual in no acute distress. The patient is oriented to time, place and person. The patient's mood and affect are appropriate. Patient appears well nourished. Body habitus is overweight    HEENT: Head is atraumatic normocephalic, ears are symmetric, eyes show equal pupils bilaterally, extraocular muscles intact. Hearing is intact to normal voice at 5 feet. Nares are patent bilaterally, no epistaxis, no rhinorrhea. Lymph: no obvious lymphedema in bilateral upper extremities     Skin: Intact in bilateral upper extremities with no ulcerations, lesions, rash, erythema. Vascular: There are no varicosities in bilateral upper  extremities, sensation intact to light touch over bilateral upper extremities. Musculoskeletal:  Left shoulder exam:  Skin:  Clear with no erythema, there is no significant joint effusion  Deformity:  none  Atrophy:  none  Tenderness:  posterior acromial, anterior shoulder, subacromial space  Active ROM:   FE:150    IR side: L4           ER side: 50    Ad/Abduction: 160        Strength: FE:5/5     ER:5/5 IR:5/5      Elbow flexion: 5/5  Neer:  moderately positive  Costa:  mildly positive     Imagin views of the left shoulder taken and reviewed in the office today show mild to moderate degenerative change of the [facility] joint, no significant degenerative change glenohumeral joint, no acute fractures or dislocations. The official read and interpretation of these x-rays will be done by the the [facility]       Assessment:    Diagnosis Orders   1. Rotator cuff impingement syndrome of left shoulder  [place] ARTHROCENTESIS ASPIR&/INJ MAJOR JT/BURSA W/O US         Plan:   Patient Instructions   Left shoulder injection given today  Follow-up in 6 weeks but if improved then he may cancel appointment. LEft Subacromial Bursa Aspiration / Injection Procedure:  Multiple treatment options were discussed.   This injection was recommended as a part of the overall treatment plan.  Details of the procedure, potential risks, and potential benefits were discussed. Patient's questions were answered. Patient elected to proceed with procedure. Medication: 1 mL Kenalog 40 mg/ML, 1 mL 0.5% bupivacaine, 1 mL 1% lidocaine  Procedure:  Sterile technique was used as the skin over the injection site was prepped with alcohol. The left subacromial bursa was then injected with the above listed medication. A sterile bandage was placed over the injection site. The patient tolerated the procedure well without complication. *Please note this report has been partially produced using speech recognition Dragon software and may contain errors related to that system including errors in grammar, punctuation, and spelling, as well as words and phrases that may be inappropriate.  If there are any questions or concerns please feel free to contact the dictating provider for clarification

## 2022-05-23 NOTE — PROGRESS NOTES
Cha Goncalves is a 66 y.o. male returning to the office complaining of bilateral shoulder pain L>R. Pt was seen in the office on 2.11.22 for is right should and states that he is having minimal pain since the visit. He states the onset of left shoulder pain in 3 weeks ago. He states the pain is located on ine anterior aspect of with intermittent posterior pain. The pain is aching and constant in nature. Pt states he is an avid golfer and played baseball and softball for many year. He denies any new injuries or surgeries.

## 2022-07-03 DIAGNOSIS — E78.2 MIXED HYPERLIPIDEMIA: ICD-10-CM

## 2022-07-05 RX ORDER — ATORVASTATIN CALCIUM 10 MG/1
10 TABLET, FILM COATED ORAL DAILY
Qty: 90 TABLET | Refills: 1 | OUTPATIENT
Start: 2022-07-05

## 2022-08-04 ENCOUNTER — OFFICE VISIT (OUTPATIENT)
Dept: ORTHOPEDIC SURGERY | Age: 72
End: 2022-08-04
Payer: COMMERCIAL

## 2022-08-04 VITALS
WEIGHT: 178.4 LBS | DIASTOLIC BLOOD PRESSURE: 72 MMHG | BODY MASS INDEX: 27.94 KG/M2 | OXYGEN SATURATION: 95 % | SYSTOLIC BLOOD PRESSURE: 138 MMHG | HEART RATE: 80 BPM

## 2022-08-04 DIAGNOSIS — M75.42 ROTATOR CUFF IMPINGEMENT SYNDROME OF LEFT SHOULDER: Primary | ICD-10-CM

## 2022-08-04 PROCEDURE — 20610 DRAIN/INJ JOINT/BURSA W/O US: CPT | Performed by: PHYSICIAN ASSISTANT

## 2022-08-04 RX ORDER — PREDNISONE 20 MG/1
20 TABLET ORAL 2 TIMES DAILY
Qty: 14 TABLET | Refills: 0 | Status: SHIPPED | OUTPATIENT
Start: 2022-08-04 | End: 2022-08-11

## 2022-08-04 ASSESSMENT — ENCOUNTER SYMPTOMS
RESPIRATORY NEGATIVE: 1
GASTROINTESTINAL NEGATIVE: 1
EYES NEGATIVE: 1

## 2022-08-04 NOTE — PROGRESS NOTES
[place] [address] [facility]      HPI:  Randy Min is a 67 y.o. male that presents the office today complaining of left shoulder pain. He has been seen in this office before for his right shoulder back in February and at that time he was already improving so he did not get a steroid injection. He got an oral steroid to take just in case it flared up on him while he was on vacation. His last steroid injection in his left shoulder did not give him any relief and that was given through the posterior approach. Today he is wondering if we could try from the anterior approach which I said would be okay. His right shoulder is not bothering him however when it does bother him it is usually along the medial scapular border. I reviewed and agree with the portions of the HPI, review of systems, vital documentation and plan performed by my staff and have added/addended where appropriate. No past medical history on file. No past surgical history on file. Family History   Problem Relation Age of Onset    Dementia Mother     Heart Failure Father        Social History     Socioeconomic History    Marital status:     Number of children: 2   Occupational History     Comment: Retired   Tobacco Use    Smoking status: Every Day     Packs/day: 0.50     Years: 54.00     Pack years: 27.00     Types: Cigars, Cigarettes     Start date: 5/15/1965    Smokeless tobacco: Never   Substance and Sexual Activity    Alcohol use: Yes    Drug use: No     Social Determinants of Health     Financial Resource Strain: Low Risk     Difficulty of Paying Living Expenses: Not hard at all   Food Insecurity: No Food Insecurity    Worried About Running Out of Food in the Last Year: Never true    Ran Out of Food in the Last Year: Never true       Current Outpatient Medications   Medication Sig Dispense Refill    predniSONE (DELTASONE) 20 MG tablet Take 1 tablet by mouth in the morning and 1 tablet before bedtime.  Do all this for 7 days. 14 tablet 0    lisinopril (PRINIVIL;ZESTRIL) 30 MG tablet TAKE 1 TABLET BY MOUTH EVERY DAY 90 tablet 1    atorvastatin (LIPITOR) 10 MG tablet Take 1 tablet by mouth daily TAKE 1 TABLET BY MOUTH EVERY DAY 90 tablet 1    LORATADINE PO Take 5 mg by mouth daily as needed      vitamin B-12 (CYANOCOBALAMIN) 1000 MCG tablet Take 1,000 mcg by mouth daily Pt states he is taking 2500 mcg       No current facility-administered medications for this visit. Allergies   Allergen Reactions    Seasonal        Vitals:    08/04/22 0923   BP: 138/72   Site: Right Lower Arm   Position: Sitting   Cuff Size: Medium Adult   Pulse: 80   SpO2: 95%   Weight: 178 lb 6.4 oz (80.9 kg)         Review of Systems:   Review of Systems   Constitutional: Negative. HENT: Negative. Eyes: Negative. Respiratory: Negative. Cardiovascular: Negative. Gastrointestinal: Negative. Genitourinary: Negative. Musculoskeletal:  Positive for arthralgias and myalgias. Skin: Negative. Neurological: Negative. Psychiatric/Behavioral: Negative. Physical Exam:   Gen/Psych:Examination reveals a pleasant individual in no acute distress. The patient is oriented to time, place and person. The patient's mood and affect are appropriate. Patient appears well nourished. Body habitus is overweight    HEENT: Head is atraumatic normocephalic, ears are symmetric, eyes show equal pupils bilaterally, extraocular muscles intact. Hearing is intact to normal voice at 5 feet. Nares are patent bilaterally, no epistaxis, no rhinorrhea. Lymph: no obvious lymphedema in bilateral upper extremities     Skin: Intact in bilateral upper extremities with no ulcerations, lesions, rash, erythema. Vascular: There are no varicosities in bilateral upper  extremities, sensation intact to light touch over bilateral upper extremities.     Musculoskeletal:  Left shoulder exam:  Skin:  Clear with no erythema, there is no significant joint effusion  Deformity:  none  Atrophy:  none  Tenderness:  posterior acromial, anterior shoulder, subacromial space  Active ROM:   FE:150    IR side: L4           ER side: 50    Ad/Abduction: 160        Strength: FE:5/5     ER:5/5 IR:5/5      Elbow flexion: 5/5  Neer:  moderately positive  Costa:  mildly positive     Imagin views of the left shoulder reviewed in the office today show mild to moderate degenerative change of the [facility] joint, no significant degenerative change glenohumeral joint, no acute fractures or dislocations. Assessment:    Diagnosis Orders   1. Rotator cuff impingement syndrome of left shoulder  [place] ARTHROCENTESIS ASPIR&/INJ MAJOR JT/BURSA W/O US            Plan:   Patient Instructions   Follow-up as needed. If no relief from this injection in the left shoulder then call the office and we will order an MRI for the left shoulder. Left shoulder injection given to the anterior shoulder. LEft Subacromial Bursa Aspiration / Injection Procedure:  Multiple treatment options were discussed. This injection was recommended as a part of the overall treatment plan. Details of the procedure, potential risks, and potential benefits were discussed. Patient's questions were answered. Patient elected to proceed with procedure. Medication: 1 mL Kenalog 40 mg/ML, 1 mL 0.5% bupivacaine, 1 mL 1% lidocaine  Procedure:  Sterile technique was used as the skin over the injection site was prepped with alcohol. The left subacromial bursa was then injected with the above listed medication through an anterior approach. A sterile bandage was placed over the injection site. The patient tolerated the procedure well without complication. *Please note this report has been partially produced using speech recognition Dragon software and may contain errors related to that system including errors in grammar, punctuation, and spelling, as well as words and phrases that may be inappropriate.  If there are any questions or concerns please feel free to contact the dictating provider for clarification

## 2022-08-04 NOTE — PATIENT INSTRUCTIONS
Follow-up as needed. If no relief from this injection in the left shoulder then call the office and we will order an MRI for the left shoulder. Left shoulder injection given to the anterior shoulder.

## 2022-10-07 DIAGNOSIS — E78.2 MIXED HYPERLIPIDEMIA: ICD-10-CM

## 2022-10-07 RX ORDER — ATORVASTATIN CALCIUM 10 MG/1
TABLET, FILM COATED ORAL
Qty: 90 TABLET | Refills: 1 | Status: SHIPPED | OUTPATIENT
Start: 2022-10-07

## 2022-11-12 DIAGNOSIS — I10 ESSENTIAL HYPERTENSION: ICD-10-CM

## 2022-11-14 RX ORDER — LISINOPRIL 30 MG/1
TABLET ORAL
Qty: 90 TABLET | Refills: 1 | Status: SHIPPED | OUTPATIENT
Start: 2022-11-14

## 2022-11-21 ENCOUNTER — OFFICE VISIT (OUTPATIENT)
Dept: FAMILY MEDICINE CLINIC | Age: 72
End: 2022-11-21
Payer: COMMERCIAL

## 2022-11-21 VITALS
OXYGEN SATURATION: 99 % | HEIGHT: 67 IN | BODY MASS INDEX: 27.8 KG/M2 | HEART RATE: 48 BPM | DIASTOLIC BLOOD PRESSURE: 68 MMHG | WEIGHT: 177.1 LBS | SYSTOLIC BLOOD PRESSURE: 111 MMHG

## 2022-11-21 DIAGNOSIS — M25.512 CHRONIC LEFT SHOULDER PAIN: Primary | ICD-10-CM

## 2022-11-21 DIAGNOSIS — G89.29 CHRONIC LEFT SHOULDER PAIN: Primary | ICD-10-CM

## 2022-11-21 DIAGNOSIS — I10 ESSENTIAL HYPERTENSION: ICD-10-CM

## 2022-11-21 DIAGNOSIS — E78.2 MIXED HYPERLIPIDEMIA: ICD-10-CM

## 2022-11-21 PROCEDURE — 3078F DIAST BP <80 MM HG: CPT | Performed by: FAMILY MEDICINE

## 2022-11-21 PROCEDURE — G0008 ADMIN INFLUENZA VIRUS VAC: HCPCS | Performed by: FAMILY MEDICINE

## 2022-11-21 PROCEDURE — 3074F SYST BP LT 130 MM HG: CPT | Performed by: FAMILY MEDICINE

## 2022-11-21 PROCEDURE — 99213 OFFICE O/P EST LOW 20 MIN: CPT | Performed by: FAMILY MEDICINE

## 2022-11-21 PROCEDURE — 90694 VACC AIIV4 NO PRSRV 0.5ML IM: CPT | Performed by: FAMILY MEDICINE

## 2022-11-21 PROCEDURE — 1123F ACP DISCUSS/DSCN MKR DOCD: CPT | Performed by: FAMILY MEDICINE

## 2022-11-21 SDOH — ECONOMIC STABILITY: FOOD INSECURITY: WITHIN THE PAST 12 MONTHS, THE FOOD YOU BOUGHT JUST DIDN'T LAST AND YOU DIDN'T HAVE MONEY TO GET MORE.: NEVER TRUE

## 2022-11-21 SDOH — HEALTH STABILITY: PHYSICAL HEALTH: ON AVERAGE, HOW MANY DAYS PER WEEK DO YOU ENGAGE IN MODERATE TO STRENUOUS EXERCISE (LIKE A BRISK WALK)?: 5 DAYS

## 2022-11-21 SDOH — HEALTH STABILITY: PHYSICAL HEALTH: ON AVERAGE, HOW MANY MINUTES DO YOU ENGAGE IN EXERCISE AT THIS LEVEL?: 30 MIN

## 2022-11-21 SDOH — ECONOMIC STABILITY: FOOD INSECURITY: WITHIN THE PAST 12 MONTHS, YOU WORRIED THAT YOUR FOOD WOULD RUN OUT BEFORE YOU GOT MONEY TO BUY MORE.: NEVER TRUE

## 2022-11-21 ASSESSMENT — PATIENT HEALTH QUESTIONNAIRE - PHQ9
SUM OF ALL RESPONSES TO PHQ QUESTIONS 1-9: 0
SUM OF ALL RESPONSES TO PHQ9 QUESTIONS 1 & 2: 0
SUM OF ALL RESPONSES TO PHQ QUESTIONS 1-9: 0
1. LITTLE INTEREST OR PLEASURE IN DOING THINGS: 0
2. FEELING DOWN, DEPRESSED OR HOPELESS: 0
SUM OF ALL RESPONSES TO PHQ QUESTIONS 1-9: 0
SUM OF ALL RESPONSES TO PHQ QUESTIONS 1-9: 0
2. FEELING DOWN, DEPRESSED OR HOPELESS: 0
1. LITTLE INTEREST OR PLEASURE IN DOING THINGS: 0
SUM OF ALL RESPONSES TO PHQ QUESTIONS 1-9: 0
SUM OF ALL RESPONSES TO PHQ9 QUESTIONS 1 & 2: 0

## 2022-11-21 ASSESSMENT — SOCIAL DETERMINANTS OF HEALTH (SDOH): HOW HARD IS IT FOR YOU TO PAY FOR THE VERY BASICS LIKE FOOD, HOUSING, MEDICAL CARE, AND HEATING?: NOT HARD AT ALL

## 2022-11-21 ASSESSMENT — ENCOUNTER SYMPTOMS
EYE PAIN: 0
CHEST TIGHTNESS: 0
TROUBLE SWALLOWING: 0
DIARRHEA: 0
BLOOD IN STOOL: 0
ABDOMINAL PAIN: 0
VOMITING: 0
NAUSEA: 0
SHORTNESS OF BREATH: 0
WHEEZING: 0

## 2022-11-21 ASSESSMENT — LIFESTYLE VARIABLES
HOW OFTEN DURING THE LAST YEAR HAVE YOU FOUND THAT YOU WERE NOT ABLE TO STOP DRINKING ONCE YOU HAD STARTED: 0
HOW OFTEN DURING THE LAST YEAR HAVE YOU NEEDED AN ALCOHOLIC DRINK FIRST THING IN THE MORNING TO GET YOURSELF GOING AFTER A NIGHT OF HEAVY DRINKING: NEVER
HAVE YOU OR SOMEONE ELSE BEEN INJURED AS A RESULT OF YOUR DRINKING: NO
HOW OFTEN DO YOU HAVE A DRINK CONTAINING ALCOHOL: 5
HAS A RELATIVE, FRIEND, DOCTOR, OR ANOTHER HEALTH PROFESSIONAL EXPRESSED CONCERN ABOUT YOUR DRINKING OR SUGGESTED YOU CUT DOWN: 0
HOW OFTEN DURING THE LAST YEAR HAVE YOU FAILED TO DO WHAT WAS NORMALLY EXPECTED FROM YOU BECAUSE OF DRINKING: 0
HOW OFTEN DURING THE LAST YEAR HAVE YOU FAILED TO DO WHAT WAS NORMALLY EXPECTED FROM YOU BECAUSE OF DRINKING: NEVER
HOW OFTEN DURING THE LAST YEAR HAVE YOU FOUND THAT YOU WERE NOT ABLE TO STOP DRINKING ONCE YOU HAD STARTED: NEVER
HOW OFTEN DO YOU HAVE SIX OR MORE DRINKS ON ONE OCCASION: 1
HOW MANY STANDARD DRINKS CONTAINING ALCOHOL DO YOU HAVE ON A TYPICAL DAY: 1 OR 2
HAS A RELATIVE, FRIEND, DOCTOR, OR ANOTHER HEALTH PROFESSIONAL EXPRESSED CONCERN ABOUT YOUR DRINKING OR SUGGESTED YOU CUT DOWN: NO
HOW MANY STANDARD DRINKS CONTAINING ALCOHOL DO YOU HAVE ON A TYPICAL DAY: 1
HOW OFTEN DO YOU HAVE A DRINK CONTAINING ALCOHOL: 4 OR MORE TIMES A WEEK
HAVE YOU OR SOMEONE ELSE BEEN INJURED AS A RESULT OF YOUR DRINKING: 0
HOW OFTEN DURING THE LAST YEAR HAVE YOU HAD A FEELING OF GUILT OR REMORSE AFTER DRINKING: NEVER
HOW OFTEN DURING THE LAST YEAR HAVE YOU BEEN UNABLE TO REMEMBER WHAT HAPPENED THE NIGHT BEFORE BECAUSE YOU HAD BEEN DRINKING: NEVER
HOW OFTEN DURING THE LAST YEAR HAVE YOU HAD A FEELING OF GUILT OR REMORSE AFTER DRINKING: 0
HOW OFTEN DURING THE LAST YEAR HAVE YOU NEEDED AN ALCOHOLIC DRINK FIRST THING IN THE MORNING TO GET YOURSELF GOING AFTER A NIGHT OF HEAVY DRINKING: 0
HOW OFTEN DURING THE LAST YEAR HAVE YOU BEEN UNABLE TO REMEMBER WHAT HAPPENED THE NIGHT BEFORE BECAUSE YOU HAD BEEN DRINKING: 0

## 2022-11-21 NOTE — PROGRESS NOTES
11/21/2022    Audrey Martines Chief Complaint   Patient presents with    6 Month Follow-Up    Shoulder Pain     States you are aware of this issue. Going to [place] at ortho. Flu Vaccine       HPI  History was obtained from the patient. Kerri Loya is a 67 y.o. male who presents today with routine recheck on hyperlipidemia, hypertension, allergies, and smoking. Also having increasing left shoulder pain status post Ortho visit with injection with only limited help. Starting to interfere with his ADLs and interfere with his golfing. X-rays of left shoulder showed only arthritic changes. He had labs in May 2022 these include a CBC, CMP and lipid panel and they look good. In regards to smoking-he quit 1992. Smokes only a rare cigar and does not inhale. No increase shortness of breath reported at this time. Patient does need a high-dose flu shot today. He has had fairly recent pneumonia shot and his most recent COVID booster. REVIEW OF SYMPTOMS    Review of Systems   Constitutional:  Negative for activity change and fatigue. HENT:  Negative for congestion, hearing loss, mouth sores and trouble swallowing. Eyes:  Negative for pain and visual disturbance. Respiratory:  Negative for chest tightness, shortness of breath and wheezing. Cardiovascular:  Negative for chest pain and palpitations. Gastrointestinal:  Negative for abdominal pain, blood in stool, diarrhea, nausea and vomiting. Endocrine: Negative for polydipsia and polyuria. Genitourinary:  Negative for dysuria, frequency and urgency. Musculoskeletal:  Negative for arthralgias, gait problem and neck stiffness. Patient with intractable left shoulder pain increased pain with abduction and elevation. No true radicular symptoms at this point. Skin:  Negative for rash. Allergic/Immunologic: Negative for environmental allergies. Neurological:  Negative for dizziness, seizures, speech difficulty and weakness.    Hematological: Does not bruise/bleed easily. Psychiatric/Behavioral:  Negative for agitation, confusion, hallucinations, self-injury and suicidal ideas. PAST MEDICAL HISTORY  No past medical history on file. FAMILY HISTORY  Family History   Problem Relation Age of Onset    Dementia Mother     Heart Failure Father        SOCIAL HISTORY  Social History     Socioeconomic History    Marital status:     Number of children: 2   Occupational History     Comment: Retired   Tobacco Use    Smoking status: Every Day     Packs/day: 0.50     Years: 54.00     Pack years: 27.00     Types: Cigars, Cigarettes     Start date: 5/15/1965    Smokeless tobacco: Never   Substance and Sexual Activity    Alcohol use: Yes    Drug use: No     Social Determinants of Health     Financial Resource Strain: Low Risk     Difficulty of Paying Living Expenses: Not hard at all   Food Insecurity: No Food Insecurity    Worried About Running Out of Food in the Last Year: Never true    Ran Out of Food in the Last Year: Never true   Physical Activity: Sufficiently Active    Days of Exercise per Week: 5 days    Minutes of Exercise per Session: 30 min        SURGICAL HISTORY  No past surgical history on file. CURRENT MEDICATIONS  Current Outpatient Medications   Medication Sig Dispense Refill    VITAMIN D PO Take by mouth      FIBER PO Take by mouth in the morning and at bedtime      lisinopril (PRINIVIL;ZESTRIL) 30 MG tablet TAKE 1 TABLET BY MOUTH EVERY DAY 90 tablet 1    atorvastatin (LIPITOR) 10 MG tablet TAKE 1 TABLET BY MOUTH EVERY DAY 90 tablet 1    LORATADINE PO Take 5 mg by mouth daily as needed      vitamin B-12 (CYANOCOBALAMIN) 1000 MCG tablet Take 1,000 mcg by mouth daily Pt states he is taking 2500 mcg       No current facility-administered medications for this visit.        ALLERGIES  Allergies   Allergen Reactions    Seasonal        PHYSICAL EXAM    /68 (Site: Right Upper Arm, Position: Sitting, Cuff Size: Medium Adult) Pulse (!) 48   Ht 5' 7\" (1.702 m)   Wt 177 lb 1.6 oz (80.3 kg)   SpO2 99%   BMI 27.74 kg/m²     Physical Exam  Vitals and nursing note reviewed. Constitutional:       General: He is not in acute distress. Appearance: Normal appearance. He is well-developed. He is not ill-appearing or toxic-appearing. HENT:      Head: Normocephalic and atraumatic. Nose: Nose normal.      Mouth/Throat:      Mouth: Mucous membranes are moist.   Eyes:      Pupils: Pupils are equal, round, and reactive to light. Cardiovascular:      Rate and Rhythm: Normal rate and regular rhythm. Heart sounds: Normal heart sounds. No murmur heard. No gallop. Pulmonary:      Effort: Pulmonary effort is normal. No respiratory distress. Breath sounds: Normal breath sounds. No wheezing, rhonchi or rales. Abdominal:      Palpations: Abdomen is soft. Musculoskeletal:         General: Normal range of motion. Cervical back: Normal range of motion and neck supple. No rigidity or tenderness. Comments: Patient with painful range of motion especially with elevation and abduction. No rash or atrophy noted at this point.-   Lymphadenopathy:      Cervical: No cervical adenopathy. Skin:     General: Skin is warm and dry. Neurological:      General: No focal deficit present. Mental Status: He is alert and oriented to person, place, and time. Cranial Nerves: No cranial nerve deficit. Motor: No weakness. Gait: Gait normal.   Psychiatric:         Mood and Affect: Mood normal.         Behavior: Behavior normal.         Thought Content: Thought content normal.       ASSESSMENT & PLAN     Diagnosis Orders   1. Chronic left shoulder pain  MRI SHOULDER LEFT WO CONTRAST      2. Essential hypertension        3. Mixed hyperlipidemia        Will get MRI of the left shoulder and then plan treatment and further eval from there. He did receive high-dose flu shot today.   And would keep him on the same regimen. Return in about 6 months (around 5/21/2023), or if symptoms worsen or fail to improve.          Electronically signed by Susan Alfaro MD on 11/21/2022

## 2022-11-23 ENCOUNTER — TELEMEDICINE (OUTPATIENT)
Dept: FAMILY MEDICINE CLINIC | Age: 72
End: 2022-11-23
Payer: COMMERCIAL

## 2022-11-23 DIAGNOSIS — Z00.00 MEDICARE ANNUAL WELLNESS VISIT, SUBSEQUENT: Primary | ICD-10-CM

## 2022-11-23 PROCEDURE — G0439 PPPS, SUBSEQ VISIT: HCPCS | Performed by: FAMILY MEDICINE

## 2022-11-23 PROCEDURE — 1123F ACP DISCUSS/DSCN MKR DOCD: CPT | Performed by: FAMILY MEDICINE

## 2022-11-23 ASSESSMENT — LIFESTYLE VARIABLES
HOW OFTEN DURING THE LAST YEAR HAVE YOU FAILED TO DO WHAT WAS NORMALLY EXPECTED FROM YOU BECAUSE OF DRINKING: 0
HOW MANY STANDARD DRINKS CONTAINING ALCOHOL DO YOU HAVE ON A TYPICAL DAY: 1 OR 2
HAS A RELATIVE, FRIEND, DOCTOR, OR ANOTHER HEALTH PROFESSIONAL EXPRESSED CONCERN ABOUT YOUR DRINKING OR SUGGESTED YOU CUT DOWN: 0
HOW OFTEN DURING THE LAST YEAR HAVE YOU NEEDED AN ALCOHOLIC DRINK FIRST THING IN THE MORNING TO GET YOURSELF GOING AFTER A NIGHT OF HEAVY DRINKING: 0
HOW OFTEN DURING THE LAST YEAR HAVE YOU BEEN UNABLE TO REMEMBER WHAT HAPPENED THE NIGHT BEFORE BECAUSE YOU HAD BEEN DRINKING: 0
HOW OFTEN DURING THE LAST YEAR HAVE YOU HAD A FEELING OF GUILT OR REMORSE AFTER DRINKING: 0
HOW OFTEN DO YOU HAVE A DRINK CONTAINING ALCOHOL: 4 OR MORE TIMES A WEEK
HOW OFTEN DURING THE LAST YEAR HAVE YOU FOUND THAT YOU WERE NOT ABLE TO STOP DRINKING ONCE YOU HAD STARTED: 0
HAVE YOU OR SOMEONE ELSE BEEN INJURED AS A RESULT OF YOUR DRINKING: 0

## 2022-11-23 ASSESSMENT — PATIENT HEALTH QUESTIONNAIRE - PHQ9
SUM OF ALL RESPONSES TO PHQ QUESTIONS 1-9: 0
SUM OF ALL RESPONSES TO PHQ9 QUESTIONS 1 & 2: 0
1. LITTLE INTEREST OR PLEASURE IN DOING THINGS: 0
SUM OF ALL RESPONSES TO PHQ QUESTIONS 1-9: 0
SUM OF ALL RESPONSES TO PHQ QUESTIONS 1-9: 0
2. FEELING DOWN, DEPRESSED OR HOPELESS: 0
SUM OF ALL RESPONSES TO PHQ QUESTIONS 1-9: 0

## 2022-11-23 NOTE — PATIENT INSTRUCTIONS
Personalized Preventive Plan for Levy Chopra - 11/23/2022  Medicare offers a range of preventive health benefits. Some of the tests and screenings are paid in full while other may be subject to a deductible, co-insurance, and/or copay. Some of these benefits include a comprehensive review of your medical history including lifestyle, illnesses that may run in your family, and various assessments and screenings as appropriate. After reviewing your medical record and screening and assessments performed today your provider may have ordered immunizations, labs, imaging, and/or referrals for you. A list of these orders (if applicable) as well as your Preventive Care list are included within your After Visit Summary for your review. Other Preventive Recommendations:    A preventive eye exam performed by an eye specialist is recommended every 1-2 years to screen for glaucoma; cataracts, macular degeneration, and other eye disorders. A preventive dental visit is recommended every 6 months. Try to get at least 150 minutes of exercise per week or 10,000 steps per day on a pedometer . Order or download the FREE \"Exercise & Physical Activity: Your Everyday Guide\" from The link [employer]. Call 2-996.440.7527 or search The link [employer] online. You need 4556-2173 mg of calcium and 3980-8681 IU of vitamin D per day. It is possible to meet your calcium requirement with diet alone, but a vitamin D supplement is usually necessary to meet this goal.  When exposed to the sun, use a sunscreen that protects against both UVA and UVB radiation with an SPF of 30 or greater. Reapply every 2 to 3 hours or after sweating, drying off with a towel, or swimming. Always wear a seat belt when traveling in a car. Always wear a helmet when riding a bicycle or motorcycle.

## 2022-11-23 NOTE — PROGRESS NOTES
Medicare Annual Wellness Visit    Abida Quezada is here for Medicare AWV    Assessment & Plan   Medicare annual wellness visit, subsequent    Recommendations for Preventive Services Due: see orders and patient instructions/AVS.  Recommended screening schedule for the next 5-10 years is provided to the patient in written form: see Patient Instructions/AVS.     No follow-ups on file. Subjective       Patient's complete Health Risk Assessment and screening values have been reviewed and are found in Flowsheets. The following problems were reviewed today and where indicated follow up appointments were made and/or referrals ordered. Positive Risk Factor Screenings with Interventions:       Tobacco Use:  Tobacco Use: High Risk    Smoking Tobacco Use: Some Days    Smokeless Tobacco Use: Never    Passive Exposure: Not on file     E-cigarette/Vaping       Questions Responses    E-cigarette/Vaping Use     Start Date     Passive Exposure     Quit Date     Counseling Given     Comments           Substance Use - Tobacco Interventions:  patient is not ready to work toward tobacco cessation at this time, patient states he quit smoking cigarettes 30 years ago and did not smoke very much. States he will have a cigar on occasion, but not daily. General Health and ACP:  General  In general, how would you say your health is?: Very Good  In the past 7 days, have you experienced any of the following: New or Increased Pain, New or Increased Fatigue, Loneliness, Social Isolation, Stress or Anger?: No  Do you get the social and emotional support that you need?: Yes  Do you have a Living Will?: (!) No    Advance Directives       Power of [address] Will ACP-Advance Directive ACP-Power of     Not on File Not on File Not on File Not on File          General Health Risk Interventions:  No Living Will: Advance Care Planning addressed with patient today-states he has the forms, just needs to complete them. billable service, which includes applicable co-pays. This Virtual Visit was conducted with patient's (and/or legal guardian's) consent. The visit was conducted pursuant to the emergency declaration under the [facility], [address] [place] [address] authority and the [employer] and [employer] Act. Patient identification was verified, and a caregiver was present when appropriate. The patient was located at Home: [address] [place] [address] [phone]. Provider was located at [facility] ([address] [place] [address]): [address] [facility]. [place] [phone]. Total time spent for this encounter: Not billed by time    --Shaunna Kennedy LPN on 94/30/5369 at 4:12 PM    An electronic signature was used to authenticate this note. This encounter was performed under Ragini castro MDs, direct supervision, 11/23/2022.

## 2022-12-05 DIAGNOSIS — I10 ESSENTIAL HYPERTENSION: ICD-10-CM

## 2022-12-06 ENCOUNTER — HOSPITAL ENCOUNTER (OUTPATIENT)
Dept: MRI IMAGING | Age: 72
Discharge: HOME | End: 2022-12-06
Payer: COMMERCIAL

## 2022-12-06 DIAGNOSIS — M25.512 CHRONIC LEFT SHOULDER PAIN: ICD-10-CM

## 2022-12-06 DIAGNOSIS — G89.29 CHRONIC LEFT SHOULDER PAIN: ICD-10-CM

## 2022-12-06 PROCEDURE — 73221 MRI JOINT UPR EXTREM W/O DYE: CPT

## 2022-12-06 RX ORDER — LISINOPRIL 30 MG/1
TABLET ORAL
Qty: 90 TABLET | Refills: 1 | OUTPATIENT
Start: 2022-12-06

## 2022-12-07 ENCOUNTER — TELEPHONE (OUTPATIENT)
Dept: FAMILY MEDICINE CLINIC | Age: 72
End: 2022-12-07

## 2022-12-07 DIAGNOSIS — M25.512 CHRONIC LEFT SHOULDER PAIN: Primary | ICD-10-CM

## 2022-12-07 DIAGNOSIS — G89.29 CHRONIC LEFT SHOULDER PAIN: Primary | ICD-10-CM

## 2022-12-07 NOTE — TELEPHONE ENCOUNTER
----- Message from Nathalie Ibarra MD sent at 12/6/2022  4:51 PM EST -----  Please inform patient that the MRI of his left shoulder shows tearing of the long head of the biceps tendon, also has diffuse changes from osteoarthritis, and even the acromioclavicular joint has significant arthritic change. In addition to this he has evidence of rotator cuff injury and tears. As well as tendinosis. I believe he would benefit from referral to orthopod of his choice.

## 2023-01-09 DIAGNOSIS — E78.2 MIXED HYPERLIPIDEMIA: ICD-10-CM

## 2023-01-09 RX ORDER — ATORVASTATIN CALCIUM 10 MG/1
TABLET, FILM COATED ORAL
Qty: 90 TABLET | Refills: 1 | Status: SHIPPED | OUTPATIENT
Start: 2023-01-09

## 2023-01-12 ENCOUNTER — OFFICE VISIT (OUTPATIENT)
Dept: ORTHOPEDIC SURGERY | Age: 73
End: 2023-01-12
Payer: COMMERCIAL

## 2023-01-12 VITALS
SYSTOLIC BLOOD PRESSURE: 148 MMHG | WEIGHT: 177 LBS | DIASTOLIC BLOOD PRESSURE: 76 MMHG | BODY MASS INDEX: 27.78 KG/M2 | HEIGHT: 67 IN | HEART RATE: 70 BPM

## 2023-01-12 DIAGNOSIS — M75.42 ROTATOR CUFF IMPINGEMENT SYNDROME OF LEFT SHOULDER: Primary | ICD-10-CM

## 2023-01-12 DIAGNOSIS — M75.112 NONTRAUMATIC INCOMPLETE TEAR OF LEFT ROTATOR CUFF: ICD-10-CM

## 2023-01-12 DIAGNOSIS — S46.112A STRAIN OF LONG HEAD OF LEFT BICEPS: ICD-10-CM

## 2023-01-12 DIAGNOSIS — M19.012 OSTEOARTHRITIS, LOCALIZED, SHOULDER, LEFT: ICD-10-CM

## 2023-01-12 PROCEDURE — 3078F DIAST BP <80 MM HG: CPT | Performed by: ORTHOPAEDIC SURGERY

## 2023-01-12 PROCEDURE — 99214 OFFICE O/P EST MOD 30 MIN: CPT | Performed by: ORTHOPAEDIC SURGERY

## 2023-01-12 PROCEDURE — 1123F ACP DISCUSS/DSCN MKR DOCD: CPT | Performed by: ORTHOPAEDIC SURGERY

## 2023-01-12 PROCEDURE — 3074F SYST BP LT 130 MM HG: CPT | Performed by: ORTHOPAEDIC SURGERY

## 2023-01-12 NOTE — PROGRESS NOTES
Follow up for his L SHOULDER. Chronic pain has decreased. Injection in Aug, helped for 3 weeks     Pain 3/10    MRI Results     1. Shallow multifocal partial-thickness articular-surface and interstitial   tearing of mid and posterior supraspinatus and anterior superior   infraspinatus between critical zone and footplate. Mild underlying   supraspinatus and infraspinatus tendinosis. 2. Severe thinning and interstitial tearing of the intra-articular long head   of biceps tendon. 3. Mild glenohumeral chondromalacia. Mild diffuse labral degeneration. 4. Mild degenerative change of the left AC joint.

## 2023-01-12 NOTE — PATIENT INSTRUCTIONS
We will schedule surgery at soonest convenience.  If you have any questions regarding your surgery please call our office and ask to speak with Galindo Muller [phone]

## 2023-01-16 ENCOUNTER — TELEPHONE (OUTPATIENT)
Dept: ORTHOPEDIC SURGERY | Age: 73
End: 2023-01-16

## 2023-01-16 PROBLEM — M75.42 ROTATOR CUFF IMPINGEMENT SYNDROME OF LEFT SHOULDER: Status: ACTIVE | Noted: 2023-01-16

## 2023-01-16 PROBLEM — M75.112 NONTRAUMATIC INCOMPLETE TEAR OF LEFT ROTATOR CUFF: Status: ACTIVE | Noted: 2023-01-16

## 2023-01-16 PROBLEM — M19.012 OSTEOARTHRITIS, LOCALIZED, SHOULDER, LEFT: Status: ACTIVE | Noted: 2023-01-16

## 2023-01-16 PROBLEM — S46.112A STRAIN OF LONG HEAD OF LEFT BICEPS: Status: ACTIVE | Noted: 2023-01-16

## 2023-01-16 ASSESSMENT — ENCOUNTER SYMPTOMS
WHEEZING: 0
SHORTNESS OF BREATH: 0
VOMITING: 0
EYE REDNESS: 0
EYE PAIN: 0
COLOR CHANGE: 0
CHEST TIGHTNESS: 0

## 2023-01-16 NOTE — PROGRESS NOTES
1/12/2023   Chief Complaint   Patient presents with    Follow-up     L SHOULDER MRI         History of Present Illness:                             María Leo is a 67 y.o. male who presents today for evaluation of his ongoing left shoulder pain and weakness. He has had symptoms for about a year and has been through extensive treatments in the past.  Has been through 2 rounds of steroid injections. Last injection performed in August gave him some relief initially but this lasted only a few weeks. He continues to have weakness with rotational movements and trouble getting his arm out away from his body or up overhead. He has sharp pains deep in the shoulder joints occasional feelings of popping or catching with rotational and overhead movements. Pain occasionally ranges on the lateral aspect of his arm. Pain is better with rest and avoidance of strenuous activities. He has had trouble keeping up with his normal golf activities because of pain in the shoulder while golfing. Follow up for his L SHOULDER. Chronic pain has decreased. Injection in Aug, helped for 3 weeks      Pain 3/10               Medical History  Patient's medications, allergies, past medical, surgical, social and family histories were reviewed and updated as appropriate. No past medical history on file. No past surgical history on file.   Family History   Problem Relation Age of Onset    Dementia Mother     Heart Failure Father     Cancer Sister         hysterectomy-current chemo 11/23/22    No Known Problems Sister     No Known Problems Sister      Social History     Socioeconomic History    Marital status:     Number of children: 2   Occupational History     Comment: Retired   Tobacco Use    Smoking status: Some Days     Packs/day: 0.15     Years: 57.00     Pack years: 8.55     Types: Cigars, Cigarettes     Start date: 5/15/1965    Smokeless tobacco: Never    Tobacco comments:     Pt states he quit smoking cigs 30 yrs ago and did not smoke much then. States he will have a cigar on occasion, but not daily   Substance and Sexual Activity    Alcohol use: Yes    Drug use: No     Social Determinants of Health     Financial Resource Strain: Low Risk     Difficulty of Paying Living Expenses: Not hard at all   Food Insecurity: No Food Insecurity    Worried About Running Out of Food in the Last Year: Never true    Ran Out of Food in the Last Year: Never true   Physical Activity: Sufficiently Active    Days of Exercise per Week: 5 days    Minutes of Exercise per Session: 30 min     Current Outpatient Medications   Medication Sig Dispense Refill    atorvastatin (LIPITOR) 10 MG tablet TAKE 1 TABLET BY MOUTH EVERY DAY 90 tablet 1    VITAMIN D PO Take by mouth      FIBER PO Take by mouth in the morning and at bedtime      lisinopril (PRINIVIL;ZESTRIL) 30 MG tablet TAKE 1 TABLET BY MOUTH EVERY DAY 90 tablet 1    LORATADINE PO Take 5 mg by mouth daily as needed      vitamin B-12 (CYANOCOBALAMIN) 1000 MCG tablet Take 1,000 mcg by mouth daily Pt states he is taking 2500 mcg       No current facility-administered medications for this visit. Allergies   Allergen Reactions    Seasonal          Review of Systems   Constitutional:  Negative for chills and fever. HENT:  Negative for congestion and sneezing. Eyes:  Negative for pain and redness. Respiratory:  Negative for chest tightness, shortness of breath and wheezing. Cardiovascular:  Negative for chest pain and palpitations. Gastrointestinal:  Negative for vomiting. Musculoskeletal:  Positive for arthralgias. Skin:  Negative for color change and rash. Neurological:  Negative for weakness and numbness. Psychiatric/Behavioral:  Negative for agitation. The patient is not nervous/anxious.                                               Examination:  General Exam:  Vitals: BP (!) 148/76 (Site: Right Upper Arm)   Pulse 70   Ht 5' 7\" (1.702 m)   Wt 177 lb (80.3 kg)   BMI 27.72 kg/m²    Physical Exam  Vitals and nursing note reviewed. Constitutional:       Appearance: Normal appearance. HENT:      Head: Normocephalic and atraumatic. Eyes:      Conjunctiva/sclera: Conjunctivae normal.      Pupils: Pupils are equal, round, and reactive to light. Pulmonary:      Effort: Pulmonary effort is normal.   Musculoskeletal:      Right shoulder: No swelling, deformity, laceration, tenderness or bony tenderness. Normal range of motion. Normal strength. Right elbow: Normal.      Left elbow: Normal. No swelling, deformity, effusion or lacerations. Normal range of motion. No tenderness. Cervical back: Normal range of motion. Comments: Left Upper Extremity:    There is moderate tenderness diffusely throughout the shoulder. Tenderness to palpation is maximal over the anterior and lateral aspects of the shoulder specifically along the greater tuberosity and proximal biceps tendon. Active range of motion is limited due to pain. Forward elevation present to 100 degrees, abduction present to 80 degrees, external rotation 15 degrees. Passive range of motion is moderately restricted and limited due to pain and apprehension. Forward elevation 120 degrees, abduction 100 degrees. Rotator cuff testing is difficult due to pain. Strength 4/5 forward elevation and abduction of the shoulder. There is breakaway to strength testing due to pain. Positive empty can test.  Positive drop arm sign. Positive Costa and Neer signs. Moderate pain at the acromioclavicular joint with crossarm test.    Skin is intact. Sensation is intact in the upper extremity. 2+ radial pulse. No edema. No muscle atrophy. Skin:     General: Skin is warm and dry. Neurological:      Mental Status: He is alert and oriented to person, place, and time.    Psychiatric:         Mood and Affect: Mood normal.         Behavior: Behavior normal.          Diagnostic testing:    X-ray and MRI images of the left shoulder reviewed by myself discussed with the patient today:  MRI Results      1. Shallow multifocal partial-thickness articular-surface and interstitial   tearing of mid and posterior supraspinatus and anterior superior   infraspinatus between critical zone and footplate. Mild underlying   supraspinatus and infraspinatus tendinosis. 2. Severe thinning and interstitial tearing of the intra-articular long head   of biceps tendon. 3. Mild glenohumeral chondromalacia. Mild diffuse labral degeneration. 4. Mild degenerative change of the left AC joint. Office Procedures:  No orders of the defined types were placed in this encounter. Assessment and Plan  1. Left shoulder partial-thickness rotator cuff tear    2. Left shoulder proximal biceps tendon partial-thickness tear    3. Left shoulder impingement syndrome    4. Left shoulder mild primary osteoarthritis    We reviewed the MRI findings in detail. The patient has been having worsening symptoms for several months and has failed extensive conservative treatments. He is not interested in any further injections at this time. He feels that his shoulder has continued to worsen despite conservative treatments and he is interested in discussing surgical intervention. We discussed the extent of the injury to the biceps tendon as well as the rotator cuff. The rotator cuff appears to have a high-grade partial tear and there is significant pathology of the biceps tendon as well. I reviewed the findings of the MRI with the patient. We discussed the severity of the injury to the rotator cuff and proximal biceps tendon. Given the patient's symptoms, failure of conservative treatments, and the findings on the MRI I have recommended that we proceed with shoulder arthroscopy for rotator cuff repair and subacromial decompression.   Additionally we will address the biceps tendon and consider biceps tenotomy versus tenodesis at the time of surgery. We discussed the risks and benefits of surgery. We discussed the postoperative course and need for initial immobilization followed by subsequent rehabilitation and physical therapy. We discussed the potential risks with surgery including possible incomplete repair or rerupture after successful repair. We discussed the potential for residual pain, weakness, or stiffness at the shoulder despite appropriate surgical intervention. We discussed the goals of surgery to restore range of motion and strength which may take months to return.     The patient understands and would like to proceed with shoulder arthroscopy      Electronically signed by Jose Villanueva MD on 1/16/2023 at 10:38 AM

## 2023-01-16 NOTE — TELEPHONE ENCOUNTER
Scheduled patient in office for LT RTCR/SAD/Biceps Tenodesis on 1/25/23 at [facility]. Patient voiced understanding of date/time and instructions. Procedure request faxed. PCP Clearance sent. Insurance ( [employer]) contacted on 1/16/23 via forms and clinicals faxed to [phone]. CPT [id number]; [id number]; [id number] are pending clinical review.

## 2023-01-17 ENCOUNTER — TELEPHONE (OUTPATIENT)
Dept: ORTHOPEDIC SURGERY | Age: 73
End: 2023-01-17

## 2023-01-17 RX ORDER — MULTIVIT WITH MINERALS/LUTEIN
250 TABLET ORAL DAILY
COMMUNITY

## 2023-01-17 NOTE — PROGRESS NOTES
.Surgery @ [facility] on 1/25/23 you will be called 1/24/23 with times               1. Do not eat or drink anything after midnight - unless instructed by your doctor prior to surgery. This includes                   no water, chewing gum or mints.   2. Follow your directions as prescribed by the doctor for your procedure and medications.    3. Check with your Doctor regarding stopping vitamins, supplements, blood thinners and follow their instructions.  Stop vitamins, supplements and NSAIDS: 1/18/2023   4. Do not smoke, vape or use chewing tobacco morning of surgery. Do not drink any alcoholic beverages 24 hours prior to surgery.                   This includes NA Beer.  No street drugs 7 days prior to surgery.   5. You may brush your teeth and gargle the morning of surgery.  DO NOT SWALLOW WATER   6. You MUST make arrangements for a responsible adult to take you home after your surgery and be able to check on you every couple                   hours for the day. You will not be allowed to leave alone or drive yourself home.  It is strongly suggested someone stay with you the first 24                   hrs. Your surgery will be cancelled if you do not have a ride home.   7. Please wear simple, loose fitting clothing to the hospital.  Do not bring valuables (money, credit cards, checkbooks, etc.) Do not wear any                   makeup (including no eye makeup) or nail polish on your fingers or toes.             8. DO NOT wear any jewelry or piercings on day of surgery.  All body piercing jewelry must be removed.             9. If you have dentures, they will be removed before going to the OR; we will provide you a container.  If you wear contact lenses or glasses,                  they will be removed; please bring a case for them.           10. If you  have a Living Will and Durable Power of  for Healthcare, please bring in a copy.           11. Please bring picture ID,  insurance card, paperwork from the  doctors office    (H & P, Consent, & card for implantable devices). 12. Take a shower with Hibiclens or an antibacterial soap the night before your surgery (put clean sheets on your bed). Take a 2nd shower with the antibacterial soap the morning of surgery. Do not apply any make-up, deodorant, lotion, oil or powder. 15.  Enter thru the main entrance on the day of surgery.

## 2023-01-17 NOTE — TELEPHONE ENCOUNTER
Received fax letter for approval for CPT 30706; [id number]. Surgery is scheduled for 1/25/23.     Authz#: [id number]  Date Span: 1/25/23-3/26/23

## 2023-01-18 ENCOUNTER — HOSPITAL ENCOUNTER (OUTPATIENT)
Dept: GENERAL RADIOLOGY | Age: 73
Discharge: HOME | End: 2023-01-18
Payer: COMMERCIAL

## 2023-01-18 ENCOUNTER — HOSPITAL ENCOUNTER (OUTPATIENT)
Age: 73
Discharge: HOME | End: 2023-01-18
Payer: COMMERCIAL

## 2023-01-18 DIAGNOSIS — Z01.818 PRE-OP TESTING: ICD-10-CM

## 2023-01-18 LAB
ANION GAP SERPL CALCULATED.3IONS-SCNC: 6 MMOL/L (ref 4–16)
BASOPHILS ABSOLUTE: 0 K/CU MM
BASOPHILS RELATIVE PERCENT: 0.5 % (ref 0–1)
BUN BLDV-MCNC: 18 MG/DL (ref 6–23)
CALCIUM SERPL-MCNC: 9.1 MG/DL (ref 8.3–10.6)
CHLORIDE BLD-SCNC: 106 MMOL/L (ref 99–110)
CO2: 27 MMOL/L (ref 21–32)
CREAT SERPL-MCNC: 0.9 MG/DL (ref 0.9–1.3)
DIFFERENTIAL TYPE: ABNORMAL
EOSINOPHILS ABSOLUTE: 0.3 K/CU MM
EOSINOPHILS RELATIVE PERCENT: 3.7 % (ref 0–3)
GFR SERPL CREATININE-BSD FRML MDRD: >60 ML/MIN/1.73M2
GLUCOSE BLD-MCNC: 93 MG/DL (ref 70–99)
HCT VFR BLD CALC: 44.7 % (ref 42–52)
HEMOGLOBIN: 14.5 GM/DL (ref 13.5–18)
IMMATURE NEUTROPHIL %: 0.3 % (ref 0–0.43)
LYMPHOCYTES ABSOLUTE: 2.7 K/CU MM
LYMPHOCYTES RELATIVE PERCENT: 34.4 % (ref 24–44)
MCH RBC QN AUTO: 30.1 PG (ref 27–31)
MCHC RBC AUTO-ENTMCNC: 32.4 % (ref 32–36)
MCV RBC AUTO: 92.9 FL (ref 78–100)
MONOCYTES ABSOLUTE: 0.7 K/CU MM
MONOCYTES RELATIVE PERCENT: 8.6 % (ref 0–4)
NUCLEATED RBC %: 0 %
PDW BLD-RTO: 12.9 % (ref 11.7–14.9)
PLATELET # BLD: 247 K/CU MM (ref 140–440)
PMV BLD AUTO: 9.8 FL (ref 7.5–11.1)
POTASSIUM SERPL-SCNC: 4.9 MMOL/L (ref 3.5–5.1)
RBC # BLD: 4.81 M/CU MM (ref 4.6–6.2)
SEGMENTED NEUTROPHILS ABSOLUTE COUNT: 4.2 K/CU MM
SEGMENTED NEUTROPHILS RELATIVE PERCENT: 52.5 % (ref 36–66)
SODIUM BLD-SCNC: 139 MMOL/L (ref 135–145)
TOTAL IMMATURE NEUTOROPHIL: 0.02 K/CU MM
TOTAL NUCLEATED RBC: 0 K/CU MM
WBC # BLD: 7.9 K/CU MM (ref 4–10.5)

## 2023-01-18 PROCEDURE — 71046 X-RAY EXAM CHEST 2 VIEWS: CPT

## 2023-01-18 PROCEDURE — 80048 BASIC METABOLIC PNL TOTAL CA: CPT

## 2023-01-18 PROCEDURE — 85025 COMPLETE CBC W/AUTO DIFF WBC: CPT

## 2023-01-18 PROCEDURE — 36415 COLL VENOUS BLD VENIPUNCTURE: CPT

## 2023-01-18 PROCEDURE — 93005 ELECTROCARDIOGRAM TRACING: CPT | Performed by: ANESTHESIOLOGY

## 2023-01-19 ENCOUNTER — ANESTHESIA EVENT (OUTPATIENT)
Dept: OPERATING ROOM | Age: 73
End: 2023-01-19
Payer: COMMERCIAL

## 2023-01-19 LAB
EKG ATRIAL RATE: 64 BPM
EKG DIAGNOSIS: NORMAL
EKG P AXIS: 65 DEGREES
EKG P-R INTERVAL: 154 MS
EKG Q-T INTERVAL: 376 MS
EKG QRS DURATION: 74 MS
EKG QTC CALCULATION (BAZETT): 387 MS
EKG R AXIS: 60 DEGREES
EKG T AXIS: 47 DEGREES
EKG VENTRICULAR RATE: 64 BPM

## 2023-01-19 ASSESSMENT — LIFESTYLE VARIABLES: SMOKING_STATUS: 1

## 2023-01-19 NOTE — ANESTHESIA PRE PROCEDURE
Department of Anesthesiology  Preprocedure Note       Name:  Sylvie Alford. Age:  67 y.o.  :  1950                                          MRN:  [id number]         Date:  2023      Surgeon: Meme Fofana):  Konrad Javed MD    Procedure: Procedure(s):  LEFT SHOULDER ARTHROSCOPY ROTATOR CUFF REPAIR WITH SUBACROMIAL DECOMPRESSION  LEFT BICEPS TENODESIS    Medications prior to admission:   Prior to Admission medications    Medication Sig Start Date End Date Taking? Authorizing Provider   Ascorbic Acid (VITAMIN C) 250 MG tablet Take 250 mg by mouth daily   Yes Historical Provider, MD   atorvastatin (LIPITOR) 10 MG tablet TAKE 1 TABLET BY MOUTH EVERY DAY 23   Jeremiah Cardoso PA-C   VITAMIN D PO Take by mouth    Historical Provider, MD   FIBER PO Take by mouth in the morning and at bedtime    Historical Provider, MD   lisinopril (PRINIVIL;ZESTRIL) 30 MG tablet TAKE 1 TABLET BY MOUTH EVERY DAY 22   Buddy Ty MD   LORATADINE PO Take 5 mg by mouth daily as needed    Historical Provider, MD   vitamin B-12 (CYANOCOBALAMIN) 1000 MCG tablet Take 1,000 mcg by mouth daily Pt states he is taking 2500 mcg    Historical Provider, MD       Current medications:    No current facility-administered medications for this encounter. Current Outpatient Medications   Medication Sig Dispense Refill    Ascorbic Acid (VITAMIN C) 250 MG tablet Take 250 mg by mouth daily      atorvastatin (LIPITOR) 10 MG tablet TAKE 1 TABLET BY MOUTH EVERY DAY 90 tablet 1    VITAMIN D PO Take by mouth      FIBER PO Take by mouth in the morning and at bedtime      lisinopril (PRINIVIL;ZESTRIL) 30 MG tablet TAKE 1 TABLET BY MOUTH EVERY DAY 90 tablet 1    LORATADINE PO Take 5 mg by mouth daily as needed      vitamin B-12 (CYANOCOBALAMIN) 1000 MCG tablet Take 1,000 mcg by mouth daily Pt states he is taking 2500 mcg         Allergies:     Allergies   Allergen Reactions    Seasonal        Problem List:    Patient Active Problem List   Diagnosis Code    Mixed hyperlipidemia E78.2    Essential hypertension I10    Allergic rhinitis J30.9    Smoking-quit 1993 F17.200    Nontraumatic incomplete tear of left rotator cuff M75.112    Strain of long head of left biceps S46.112A    Rotator cuff impingement syndrome of left shoulder M75.42    Osteoarthritis, localized, shoulder, left M19.012       Past Medical History:        Diagnosis Date    Hyperlipidemia     Hypertension        Past Surgical History:        Procedure Laterality Date    COLONOSCOPY      EYE SURGERY Right 1957       Social History:    Social History     Tobacco Use    Smoking status: Some Days     Packs/day: 0.15     Years: 57.00     Pack years: 8.55     Types: Cigars, Cigarettes     Start date: 5/15/1965    Smokeless tobacco: Never    Tobacco comments:     Pt states he quit smoking cigs 30 yrs ago and did not smoke much then. States he will have a cigar on occasion, but not daily   Substance Use Topics    Alcohol use: Yes     Alcohol/week: 1.0 standard drink     Types: 1 Cans of beer per week     Comment: daily                                Ready to quit: Not Answered  Counseling given: Not Answered  Tobacco comments: Pt states he quit smoking cigs 30 yrs ago and did not smoke much then. States he will have a cigar on occasion, but not daily      Vital Signs (Current):   Vitals:    01/17/23 1231   Weight: 175 lb (79.4 kg)   Height: 5' 7\" (1.702 m)                                              BP Readings from Last 3 Encounters:   01/12/23 (!) 148/76   11/21/22 111/68   08/04/22 138/72       NPO Status:                                                                                 BMI:   Wt Readings from Last 3 Encounters:   01/12/23 177 lb (80.3 kg)   11/21/22 177 lb 1.6 oz (80.3 kg)   08/04/22 178 lb 6.4 oz (80.9 kg)     Body mass index is 27.41 kg/m².     CBC:   Lab Results   Component Value Date/Time    WBC 7.9 01/18/2023 07:50 AM    RBC 4.81 01/18/2023 07:50 AM    HGB 14.5 01/18/2023 07:50 AM    HCT 44.7 01/18/2023 07:50 AM    MCV 92.9 01/18/2023 07:50 AM    RDW 12.9 01/18/2023 07:50 AM     01/18/2023 07:50 AM       CMP:   Lab Results   Component Value Date/Time     01/18/2023 07:50 AM    K 4.9 01/18/2023 07:50 AM     01/18/2023 07:50 AM    CO2 27 01/18/2023 07:50 AM    BUN 18 01/18/2023 07:50 AM    CREATININE 0.9 01/18/2023 07:50 AM    GFRAA >60 05/16/2022 08:43 AM    AGRATIO 1.5 05/16/2022 08:43 AM    LABGLOM >60 01/18/2023 07:50 AM    GLUCOSE 93 01/18/2023 07:50 AM    PROT 7.1 05/16/2022 08:43 AM    CALCIUM 9.1 01/18/2023 07:50 AM    BILITOT 0.5 05/16/2022 08:43 AM    ALKPHOS 68 05/16/2022 08:43 AM    AST 15 05/16/2022 08:43 AM    ALT 16 05/16/2022 08:43 AM       POC Tests: No results for input(s): POCGLU, POCNA, POCK, POCCL, POCBUN, POCHEMO, POCHCT in the last 72 hours.     Coags: No results found for: PROTIME, INR, APTT    HCG (If Applicable): No results found for: PREGTESTUR, PREGSERUM, HCG, HCGQUANT     ABGs: No results found for: PHART, PO2ART, NMY7ZTX, EEW0GNL, BEART, W4XIFDXY     Type & Screen (If Applicable):  No results found for: LABABO, LABRH    Drug/Infectious Status (If Applicable):  No results found for: HIV, HEPCAB    COVID-19 Screening (If Applicable): No results found for: COVID19        Anesthesia Evaluation  Patient summary reviewed no history of anesthetic complications:   Airway: Mallampati: III  TM distance: >3 FB   Neck ROM: full  Mouth opening: > = 3 FB   Dental: normal exam         Pulmonary: breath sounds clear to auscultation  (+) current smoker                           Cardiovascular:  Exercise tolerance: good (>4 METS),   (+) hypertension:, hyperlipidemia      ECG reviewed  Rhythm: regular  Rate: normal                 ROS comment:    Sinus rhythm with marked sinus arrhythmia   Otherwise normal ECG   When compared with ECG of 27-MAR-2018 23:21,   premature atrial complexes are no longer present       1/2023 Neuro/Psych:   Negative Neuro/Psych ROS              GI/Hepatic/Renal: Neg GI/Hepatic/Renal ROS            Endo/Other:    (+) : arthritis: OA., .                 Abdominal:             Vascular: negative vascular ROS. Other Findings:           Anesthesia Plan      general and regional     ASA 2       Induction: intravenous. MIPS: Postoperative opioids intended and Prophylactic antiemetics administered. Anesthetic plan and risks discussed with patient. Plan discussed with CRNA. Pre Anesthesia Evaluation complete. Anesthesia plan, risks, benefits, alternatives, and personnel involved discussed with patient. Patients and/or legal guardian verbalized an understanding and agreed to proceed.   Beatriz Garcia DO  1/25/2023

## 2023-01-24 ENCOUNTER — TELEPHONE (OUTPATIENT)
Dept: ORTHOPEDIC SURGERY | Age: 73
End: 2023-01-24

## 2023-01-24 NOTE — TELEPHONE ENCOUNTER
Patient contacted to advise Dr Lawrence Powers and [facility] would go ahead as scheduled for 1/25/2023 surgery regardless of impending severe winter weather. Patient appreciated call and confirmed he would be there. Patient has no questions or concerns at this time.

## 2023-01-24 NOTE — PROGRESS NOTES
Patient notified of surgery time at [facility] 1/25/2023 1000 with arrival at 0800, understanding verbalized

## 2023-01-25 ENCOUNTER — ANESTHESIA (OUTPATIENT)
Dept: OPERATING ROOM | Age: 73
End: 2023-01-25
Payer: COMMERCIAL

## 2023-01-25 ENCOUNTER — HOSPITAL ENCOUNTER (OUTPATIENT)
Age: 73
Setting detail: OUTPATIENT SURGERY
Discharge: HOME | End: 2023-01-25
Attending: ORTHOPAEDIC SURGERY | Admitting: ORTHOPAEDIC SURGERY
Payer: COMMERCIAL

## 2023-01-25 VITALS
SYSTOLIC BLOOD PRESSURE: 141 MMHG | WEIGHT: 175 LBS | DIASTOLIC BLOOD PRESSURE: 69 MMHG | RESPIRATION RATE: 16 BRPM | OXYGEN SATURATION: 95 % | TEMPERATURE: 96.9 F | HEART RATE: 60 BPM | BODY MASS INDEX: 27.47 KG/M2 | HEIGHT: 67 IN

## 2023-01-25 DIAGNOSIS — M19.012 OSTEOARTHRITIS, LOCALIZED, SHOULDER, LEFT: ICD-10-CM

## 2023-01-25 DIAGNOSIS — S46.112A STRAIN OF LONG HEAD OF LEFT BICEPS: ICD-10-CM

## 2023-01-25 DIAGNOSIS — Z01.818 PRE-OP TESTING: Primary | ICD-10-CM

## 2023-01-25 DIAGNOSIS — M75.42 ROTATOR CUFF IMPINGEMENT SYNDROME OF LEFT SHOULDER: ICD-10-CM

## 2023-01-25 PROCEDURE — 3700000001 HC ADD 15 MINUTES (ANESTHESIA): Performed by: ORTHOPAEDIC SURGERY

## 2023-01-25 PROCEDURE — 2709999900 HC NON-CHARGEABLE SUPPLY: Performed by: ORTHOPAEDIC SURGERY

## 2023-01-25 PROCEDURE — 7100000010 HC PHASE II RECOVERY - FIRST 15 MIN: Performed by: ORTHOPAEDIC SURGERY

## 2023-01-25 PROCEDURE — 6360000002 HC RX W HCPCS: Performed by: ORTHOPAEDIC SURGERY

## 2023-01-25 PROCEDURE — 7100000011 HC PHASE II RECOVERY - ADDTL 15 MIN: Performed by: ORTHOPAEDIC SURGERY

## 2023-01-25 PROCEDURE — 2580000003 HC RX 258: Performed by: ORTHOPAEDIC SURGERY

## 2023-01-25 PROCEDURE — 64415 NJX AA&/STRD BRCH PLXS IMG: CPT | Performed by: ANESTHESIOLOGY

## 2023-01-25 PROCEDURE — 7100000001 HC PACU RECOVERY - ADDTL 15 MIN: Performed by: ORTHOPAEDIC SURGERY

## 2023-01-25 PROCEDURE — L3650 SO 8 ABD RESTRAINT PRE OTS: HCPCS | Performed by: ORTHOPAEDIC SURGERY

## 2023-01-25 PROCEDURE — 7100000000 HC PACU RECOVERY - FIRST 15 MIN: Performed by: ORTHOPAEDIC SURGERY

## 2023-01-25 PROCEDURE — 2500000003 HC RX 250 WO HCPCS

## 2023-01-25 PROCEDURE — 6360000002 HC RX W HCPCS

## 2023-01-25 PROCEDURE — 2580000003 HC RX 258: Performed by: ANESTHESIOLOGY

## 2023-01-25 PROCEDURE — 3700000000 HC ANESTHESIA ATTENDED CARE: Performed by: ORTHOPAEDIC SURGERY

## 2023-01-25 PROCEDURE — 6360000002 HC RX W HCPCS: Performed by: ANESTHESIOLOGY

## 2023-01-25 PROCEDURE — 2720000010 HC SURG SUPPLY STERILE: Performed by: ORTHOPAEDIC SURGERY

## 2023-01-25 PROCEDURE — 3600000004 HC SURGERY LEVEL 4 BASE: Performed by: ORTHOPAEDIC SURGERY

## 2023-01-25 PROCEDURE — C1713 ANCHOR/SCREW BN/BN,TIS/BN: HCPCS | Performed by: ORTHOPAEDIC SURGERY

## 2023-01-25 PROCEDURE — 3600000014 HC SURGERY LEVEL 4 ADDTL 15MIN: Performed by: ORTHOPAEDIC SURGERY

## 2023-01-25 DEVICE — SYSTEM IMPL DST BICEPS REP DEL W/ BICEPS BTTN 7X10MM PEEK: Type: IMPLANTABLE DEVICE | Site: SHOULDER | Status: FUNCTIONAL

## 2023-01-25 RX ORDER — HALOPERIDOL 5 MG/ML
1 INJECTION INTRAMUSCULAR
Status: DISCONTINUED | OUTPATIENT
Start: 2023-01-25 | End: 2023-01-25 | Stop reason: HOSPADM

## 2023-01-25 RX ORDER — MIDAZOLAM HYDROCHLORIDE 2 MG/2ML
2 INJECTION, SOLUTION INTRAMUSCULAR; INTRAVENOUS
Status: DISCONTINUED | OUTPATIENT
Start: 2023-01-25 | End: 2023-01-25 | Stop reason: HOSPADM

## 2023-01-25 RX ORDER — MIDAZOLAM HYDROCHLORIDE 1 MG/ML
INJECTION INTRAMUSCULAR; INTRAVENOUS PRN
Status: DISCONTINUED | OUTPATIENT
Start: 2023-01-25 | End: 2023-01-25 | Stop reason: SDUPTHER

## 2023-01-25 RX ORDER — LIDOCAINE HYDROCHLORIDE 20 MG/ML
INJECTION, SOLUTION EPIDURAL; INFILTRATION; INTRACAUDAL; PERINEURAL PRN
Status: DISCONTINUED | OUTPATIENT
Start: 2023-01-25 | End: 2023-01-25 | Stop reason: SDUPTHER

## 2023-01-25 RX ORDER — SODIUM CHLORIDE, SODIUM LACTATE, POTASSIUM CHLORIDE, CALCIUM CHLORIDE 600; 310; 30; 20 MG/100ML; MG/100ML; MG/100ML; MG/100ML
INJECTION, SOLUTION INTRAVENOUS CONTINUOUS
Status: DISCONTINUED | OUTPATIENT
Start: 2023-01-25 | End: 2023-01-25 | Stop reason: HOSPADM

## 2023-01-25 RX ORDER — OXYCODONE HYDROCHLORIDE AND ACETAMINOPHEN 5; 325 MG/1; MG/1
1 TABLET ORAL EVERY 6 HOURS PRN
Qty: 20 TABLET | Refills: 0 | Status: SHIPPED | OUTPATIENT
Start: 2023-01-25 | End: 2023-02-01

## 2023-01-25 RX ORDER — OXYCODONE HYDROCHLORIDE 5 MG/1
5 TABLET ORAL
Status: DISCONTINUED | OUTPATIENT
Start: 2023-01-25 | End: 2023-01-25 | Stop reason: HOSPADM

## 2023-01-25 RX ORDER — PROPOFOL 10 MG/ML
INJECTION, EMULSION INTRAVENOUS PRN
Status: DISCONTINUED | OUTPATIENT
Start: 2023-01-25 | End: 2023-01-25 | Stop reason: SDUPTHER

## 2023-01-25 RX ORDER — ROCURONIUM BROMIDE 10 MG/ML
INJECTION, SOLUTION INTRAVENOUS PRN
Status: DISCONTINUED | OUTPATIENT
Start: 2023-01-25 | End: 2023-01-25 | Stop reason: SDUPTHER

## 2023-01-25 RX ORDER — EPHEDRINE SULFATE 50 MG/ML
INJECTION INTRAVENOUS PRN
Status: DISCONTINUED | OUTPATIENT
Start: 2023-01-25 | End: 2023-01-25 | Stop reason: SDUPTHER

## 2023-01-25 RX ORDER — FENTANYL CITRATE 50 UG/ML
50 INJECTION, SOLUTION INTRAMUSCULAR; INTRAVENOUS EVERY 5 MIN PRN
Status: DISCONTINUED | OUTPATIENT
Start: 2023-01-25 | End: 2023-01-25 | Stop reason: HOSPADM

## 2023-01-25 RX ORDER — IPRATROPIUM BROMIDE AND ALBUTEROL SULFATE 2.5; .5 MG/3ML; MG/3ML
1 SOLUTION RESPIRATORY (INHALATION)
Status: DISCONTINUED | OUTPATIENT
Start: 2023-01-25 | End: 2023-01-25 | Stop reason: HOSPADM

## 2023-01-25 RX ORDER — DIPHENHYDRAMINE HYDROCHLORIDE 50 MG/ML
12.5 INJECTION INTRAMUSCULAR; INTRAVENOUS
Status: DISCONTINUED | OUTPATIENT
Start: 2023-01-25 | End: 2023-01-25 | Stop reason: HOSPADM

## 2023-01-25 RX ORDER — ROPIVACAINE HYDROCHLORIDE 5 MG/ML
INJECTION, SOLUTION EPIDURAL; INFILTRATION; PERINEURAL
Status: COMPLETED
Start: 2023-01-25 | End: 2023-01-25

## 2023-01-25 RX ORDER — PROCHLORPERAZINE EDISYLATE 5 MG/ML
5 INJECTION INTRAMUSCULAR; INTRAVENOUS
Status: DISCONTINUED | OUTPATIENT
Start: 2023-01-25 | End: 2023-01-25 | Stop reason: HOSPADM

## 2023-01-25 RX ORDER — IBUPROFEN 800 MG/1
800 TABLET ORAL EVERY 8 HOURS PRN
Qty: 60 TABLET | Refills: 1 | Status: SHIPPED | OUTPATIENT
Start: 2023-01-25

## 2023-01-25 RX ORDER — FENTANYL CITRATE 50 UG/ML
INJECTION, SOLUTION INTRAMUSCULAR; INTRAVENOUS PRN
Status: DISCONTINUED | OUTPATIENT
Start: 2023-01-25 | End: 2023-01-25 | Stop reason: SDUPTHER

## 2023-01-25 RX ORDER — ROPIVACAINE HYDROCHLORIDE 5 MG/ML
INJECTION, SOLUTION EPIDURAL; INFILTRATION; PERINEURAL
Status: COMPLETED | OUTPATIENT
Start: 2023-01-25 | End: 2023-01-25

## 2023-01-25 RX ORDER — MEPERIDINE HYDROCHLORIDE 25 MG/ML
6.25 INJECTION INTRAMUSCULAR; INTRAVENOUS; SUBCUTANEOUS EVERY 5 MIN PRN
Status: DISCONTINUED | OUTPATIENT
Start: 2023-01-25 | End: 2023-01-25 | Stop reason: HOSPADM

## 2023-01-25 RX ORDER — ONDANSETRON 2 MG/ML
INJECTION INTRAMUSCULAR; INTRAVENOUS PRN
Status: DISCONTINUED | OUTPATIENT
Start: 2023-01-25 | End: 2023-01-25 | Stop reason: SDUPTHER

## 2023-01-25 RX ORDER — LIDOCAINE HYDROCHLORIDE 10 MG/ML
INJECTION, SOLUTION EPIDURAL; INFILTRATION; INTRACAUDAL; PERINEURAL
Status: DISCONTINUED
Start: 2023-01-25 | End: 2023-01-25 | Stop reason: HOSPADM

## 2023-01-25 RX ORDER — HYDRALAZINE HYDROCHLORIDE 20 MG/ML
10 INJECTION INTRAMUSCULAR; INTRAVENOUS
Status: DISCONTINUED | OUTPATIENT
Start: 2023-01-25 | End: 2023-01-25 | Stop reason: HOSPADM

## 2023-01-25 RX ORDER — LABETALOL HYDROCHLORIDE 5 MG/ML
10 INJECTION, SOLUTION INTRAVENOUS
Status: DISCONTINUED | OUTPATIENT
Start: 2023-01-25 | End: 2023-01-25 | Stop reason: HOSPADM

## 2023-01-25 RX ORDER — DEXAMETHASONE SODIUM PHOSPHATE 4 MG/ML
INJECTION, SOLUTION INTRA-ARTICULAR; INTRALESIONAL; INTRAMUSCULAR; INTRAVENOUS; SOFT TISSUE PRN
Status: DISCONTINUED | OUTPATIENT
Start: 2023-01-25 | End: 2023-01-25 | Stop reason: SDUPTHER

## 2023-01-25 RX ADMIN — EPHEDRINE SULFATE 10 MG: 50 INJECTION INTRAVENOUS at 11:29

## 2023-01-25 RX ADMIN — ROCURONIUM BROMIDE 20 MG: 10 INJECTION INTRAVENOUS at 11:42

## 2023-01-25 RX ADMIN — SODIUM CHLORIDE, POTASSIUM CHLORIDE, SODIUM LACTATE AND CALCIUM CHLORIDE: 600; 310; 30; 20 INJECTION, SOLUTION INTRAVENOUS at 11:15

## 2023-01-25 RX ADMIN — PHENYLEPHRINE HYDROCHLORIDE 300 MCG: 10 INJECTION INTRAVENOUS at 11:00

## 2023-01-25 RX ADMIN — ROPIVACAINE HYDROCHLORIDE 15 ML: 5 INJECTION, SOLUTION EPIDURAL; INFILTRATION; PERINEURAL at 10:26

## 2023-01-25 RX ADMIN — FENTANYL CITRATE 25 MCG: 50 INJECTION, SOLUTION INTRAMUSCULAR; INTRAVENOUS at 12:14

## 2023-01-25 RX ADMIN — SODIUM CHLORIDE, POTASSIUM CHLORIDE, SODIUM LACTATE AND CALCIUM CHLORIDE: 600; 310; 30; 20 INJECTION, SOLUTION INTRAVENOUS at 10:25

## 2023-01-25 RX ADMIN — SUGAMMADEX 159 MG: 100 INJECTION, SOLUTION INTRAVENOUS at 12:11

## 2023-01-25 RX ADMIN — DEXAMETHASONE SODIUM PHOSPHATE 8 MG: 4 INJECTION, SOLUTION INTRAMUSCULAR; INTRAVENOUS at 10:43

## 2023-01-25 RX ADMIN — LIDOCAINE HYDROCHLORIDE 100 MG: 20 INJECTION, SOLUTION EPIDURAL; INFILTRATION; INTRACAUDAL; PERINEURAL at 10:38

## 2023-01-25 RX ADMIN — CEFAZOLIN 2000 MG: 2 INJECTION, POWDER, FOR SOLUTION INTRAMUSCULAR; INTRAVENOUS at 10:52

## 2023-01-25 RX ADMIN — PHENYLEPHRINE HYDROCHLORIDE 100 MCG: 10 INJECTION INTRAVENOUS at 11:30

## 2023-01-25 RX ADMIN — ROCURONIUM BROMIDE 50 MG: 10 INJECTION INTRAVENOUS at 10:38

## 2023-01-25 RX ADMIN — ONDANSETRON 4 MG: 2 INJECTION INTRAMUSCULAR; INTRAVENOUS at 10:43

## 2023-01-25 RX ADMIN — PHENYLEPHRINE HYDROCHLORIDE 100 MCG: 10 INJECTION INTRAVENOUS at 12:00

## 2023-01-25 RX ADMIN — MIDAZOLAM 2 MG: 1 INJECTION INTRAMUSCULAR; INTRAVENOUS at 10:19

## 2023-01-25 RX ADMIN — EPHEDRINE SULFATE 5 MG: 50 INJECTION INTRAVENOUS at 12:00

## 2023-01-25 RX ADMIN — PROPOFOL 150 MG: 10 INJECTION, EMULSION INTRAVENOUS at 10:38

## 2023-01-25 RX ADMIN — FENTANYL CITRATE 50 MCG: 50 INJECTION, SOLUTION INTRAMUSCULAR; INTRAVENOUS at 10:38

## 2023-01-25 ASSESSMENT — PAIN SCALES - GENERAL: PAINLEVEL_OUTOF10: 0

## 2023-01-25 ASSESSMENT — PAIN - FUNCTIONAL ASSESSMENT: PAIN_FUNCTIONAL_ASSESSMENT: 0-10

## 2023-01-25 NOTE — H&P
Chief Complaint   Patient presents with    Follow-up       L SHOULDER MRI          History of Present Illness:                             Mini Ken is a 67 y.o. male who presents today for evaluation of his ongoing left shoulder pain and weakness. He has had symptoms for about a year and has been through extensive treatments in the past.  Has been through 2 rounds of steroid injections. Last injection performed in August gave him some relief initially but this lasted only a few weeks. He continues to have weakness with rotational movements and trouble getting his arm out away from his body or up overhead. He has sharp pains deep in the shoulder joints occasional feelings of popping or catching with rotational and overhead movements. Pain occasionally ranges on the lateral aspect of his arm. Pain is better with rest and avoidance of strenuous activities. He has had trouble keeping up with his normal golf activities because of pain in the shoulder while golfing. Follow up for his L SHOULDER. Chronic pain has decreased. Injection in Aug, helped for 3 weeks      Pain 3/10                    Medical History  Patient's medications, allergies, past medical, surgical, social and family histories were reviewed and updated as appropriate. Past Medical History   No past medical history on file. Past Surgical History   No past surgical history on file.      Family History         Family History   Problem Relation Age of Onset    Dementia Mother      Heart Failure Father      Cancer Sister           hysterectomy-current chemo 11/23/22    No Known Problems Sister      No Known Problems Sister           Social History   Social History            Socioeconomic History    Marital status:     Number of children: 2   Occupational History       Comment: Retired   Tobacco Use    Smoking status: Some Days       Packs/day: 0.15       Years: 57.00       Pack years: 8.55       Types: Cigars, Cigarettes       Start date: 5/15/1965    Smokeless tobacco: Never    Tobacco comments:       Pt states he quit smoking cigs 30 yrs ago and did not smoke much then. States he will have a cigar on occasion, but not daily   Substance and Sexual Activity    Alcohol use: Yes    Drug use: No      Social Determinants of Health          Financial Resource Strain: Low Risk     Difficulty of Paying Living Expenses: Not hard at all   Food Insecurity: No Food Insecurity    Worried About Running Out of Food in the Last Year: Never true    Ran Out of Food in the Last Year: Never true   Physical Activity: Sufficiently Active    Days of Exercise per Week: 5 days    Minutes of Exercise per Session: 30 min         Current Facility-Administered Medications          Current Outpatient Medications   Medication Sig Dispense Refill    atorvastatin (LIPITOR) 10 MG tablet TAKE 1 TABLET BY MOUTH EVERY DAY 90 tablet 1    VITAMIN D PO Take by mouth        FIBER PO Take by mouth in the morning and at bedtime        lisinopril (PRINIVIL;ZESTRIL) 30 MG tablet TAKE 1 TABLET BY MOUTH EVERY DAY 90 tablet 1    LORATADINE PO Take 5 mg by mouth daily as needed        vitamin B-12 (CYANOCOBALAMIN) 1000 MCG tablet Take 1,000 mcg by mouth daily Pt states he is taking 2500 mcg          No current facility-administered medications for this visit. Allergies   Allergen Reactions    Seasonal              Review of Systems   Constitutional:  Negative for chills and fever. HENT:  Negative for congestion and sneezing. Eyes:  Negative for pain and redness. Respiratory:  Negative for chest tightness, shortness of breath and wheezing. Cardiovascular:  Negative for chest pain and palpitations. Gastrointestinal:  Negative for vomiting. Musculoskeletal:  Positive for arthralgias. Skin:  Negative for color change and rash. Neurological:  Negative for weakness and numbness. Psychiatric/Behavioral:  Negative for agitation.  The patient is not nervous/anxious. Examination:  General Exam:  Vitals: BP (!) 148/76 (Site: Right Upper Arm)   Pulse 70   Ht 5' 7\" (1.702 m)   Wt 177 lb (80.3 kg)   BMI 27.72 kg/m²    Physical Exam  Vitals and nursing note reviewed. Constitutional:       Appearance: Normal appearance. HENT:      Head: Normocephalic and atraumatic. Eyes:      Conjunctiva/sclera: Conjunctivae normal.      Pupils: Pupils are equal, round, and reactive to light. Pulmonary:      Effort: Pulmonary effort is normal.   Musculoskeletal:      Right shoulder: No swelling, deformity, laceration, tenderness or bony tenderness. Normal range of motion. Normal strength. Right elbow: Normal.      Left elbow: Normal. No swelling, deformity, effusion or lacerations. Normal range of motion. No tenderness. Cervical back: Normal range of motion. Comments: Left Upper Extremity:     There is moderate tenderness diffusely throughout the shoulder. Tenderness to palpation is maximal over the anterior and lateral aspects of the shoulder specifically along the greater tuberosity and proximal biceps tendon. Active range of motion is limited due to pain. Forward elevation present to 100 degrees, abduction present to 80 degrees, external rotation 15 degrees. Passive range of motion is moderately restricted and limited due to pain and apprehension. Forward elevation 120 degrees, abduction 100 degrees. Rotator cuff testing is difficult due to pain. Strength 4/5 forward elevation and abduction of the shoulder. There is breakaway to strength testing due to pain. Positive empty can test.  Positive drop arm sign. Positive Costa and Neer signs. Moderate pain at the acromioclavicular joint with crossarm test.     Skin is intact. Sensation is intact in the upper extremity. 2+ radial pulse. No edema. No muscle atrophy. Skin:     General: Skin is warm and dry.    Neurological: Mental Status: He is alert and oriented to person, place, and time. Psychiatric:         Mood and Affect: Mood normal.         Behavior: Behavior normal.            Diagnostic testing:     X-ray and MRI images of the left shoulder reviewed by myself discussed with the patient today:  MRI Results      1. Shallow multifocal partial-thickness articular-surface and interstitial   tearing of mid and posterior supraspinatus and anterior superior   infraspinatus between critical zone and footplate. Mild underlying   supraspinatus and infraspinatus tendinosis. 2. Severe thinning and interstitial tearing of the intra-articular long head   of biceps tendon. 3. Mild glenohumeral chondromalacia. Mild diffuse labral degeneration. 4. Mild degenerative change of the left AC joint. Office Procedures:  No orders of the defined types were placed in this encounter. Assessment and Plan  1. Left shoulder partial-thickness rotator cuff tear    2. Left shoulder proximal biceps tendon partial-thickness tear    3. Left shoulder impingement syndrome    4. Left shoulder mild primary osteoarthritis     We reviewed the MRI findings in detail. The patient has been having worsening symptoms for several months and has failed extensive conservative treatments. He is not interested in any further injections at this time. He feels that his shoulder has continued to worsen despite conservative treatments and he is interested in discussing surgical intervention. We discussed the extent of the injury to the biceps tendon as well as the rotator cuff. The rotator cuff appears to have a high-grade partial tear and there is significant pathology of the biceps tendon as well. I reviewed the findings of the MRI with the patient. We discussed the severity of the injury to the rotator cuff and proximal biceps tendon.   Given the patient's symptoms, failure of conservative treatments, and the findings on the MRI I have recommended that we proceed with shoulder arthroscopy for rotator cuff repair and subacromial decompression. Additionally we will address the biceps tendon and consider biceps tenotomy versus tenodesis at the time of surgery. We discussed the risks and benefits of surgery. We discussed the postoperative course and need for initial immobilization followed by subsequent rehabilitation and physical therapy. We discussed the potential risks with surgery including possible incomplete repair or rerupture after successful repair. We discussed the potential for residual pain, weakness, or stiffness at the shoulder despite appropriate surgical intervention. We discussed the goals of surgery to restore range of motion and strength which may take months to return. The patient understands and would like to proceed with shoulder arthroscopy     History and Physical exam note from the office visit is copied above from epic. I have reviewed the note and examined the patient and find no relevant changes.      I have reviewed with the patient and/or family the risks, benefits, and alternatives to the procedure as well as expected postoperative course    Gayla Baca MD

## 2023-01-25 NOTE — PROGRESS NOTES
1339- Patient returned to [facility]. Report given to this nurse from [facility] PACU. Patient is Awake sitting up in bed requesting food, No C/O pain, left shoulder is bandaged,CDI. Call light in reach, bed in lowest position. 1344- Beverage and crackers given to patient, lady friend at bedside. 1415- IV removed, patient up to side of bed getting dressed assisted by this nurse. Patient escorted to restroom.   1439- Discharge instructions given to patient w and his friend both verbalized understanding, Patient escorted to car where friend is escorting patient home

## 2023-01-25 NOTE — ANESTHESIA PROCEDURE NOTES
Peripheral Block    Patient location during procedure: pre-op  Reason for block: post-op pain management and at surgeon's request  Start time: 1/25/2023 10:20 AM  End time: 1/25/2023 10:26 AM  Staffing  Performed: anesthesiologist   Anesthesiologist: Serg Shen DO  Preanesthetic Checklist  Completed: patient identified, IV checked, site marked, risks and benefits discussed, surgical/procedural consents, equipment checked, pre-op evaluation, timeout performed, anesthesia consent given, oxygen available and monitors applied/VS acknowledged  Peripheral Block   Patient position: sitting  Prep: ChloraPrep  Provider prep: mask and sterile gloves  Patient monitoring: cardiac monitor, continuous pulse ox, frequent blood pressure checks and IV access  Block type: Brachial plexus  Interscalene  Laterality: left  Injection technique: single-shot  Guidance: ultrasound guided  Local infiltration: lidocaine  Infiltration strength: 1 %  Local infiltration: lidocaine  Dose: 3 mL    Needle   Needle gauge: 21 G  Needle localization: ultrasound guidance  Needle length: 10 cm  Assessment   Injection assessment: negative aspiration for heme, no paresthesia on injection and local visualized surrounding nerve on ultrasound  Paresthesia pain: none  Slow fractionated injection: yes  Hemodynamics: stable  Real-time US image taken/store: yes  Outcomes: uncomplicated and patient tolerated procedure well    Additional Notes  U/S [id number]. (1) Under ultrasound guidance, a 22 gauge needle was inserted and placed in close proximity to the brachial plexus. (2) Ultrasound was also used to visualize the spread of the anesthetic in close proximity to the nerve being blocked. (3) The nerve appeared anatomically normal, and (4 there were no apparent abnormal pathological findings on the image that were readily visible and related to the nerve being blocked. (5) A permanent ultrasound image was saved in the patient's record.             Medications Administered  ropivacaine (NAROPIN) injection 0.5% - Perineural   15 mL - 1/25/2023 10:26:00 AM

## 2023-01-25 NOTE — OP NOTE
Operative Note      Patient: Don Conley YOB: 1950  MRN: [id number]    Date of Procedure: 1/25/2023    Pre-Op Diagnosis:   1. left shoulder proximal biceps tendon tear    2. Left shoulder partial-thickness rotator cuff tear    3. Left shoulder impingement syndrome and subacromial bursitis    4. Left shoulder primary osteoarthritis    Post-Op Diagnosis: Same         Procedure:  1. left shoulder arthroscopy with subpectoral biceps tenodesis     2. Left shoulder arthroscopic extensive debridement including torn degenerative labral tissue, subacromial decompression, and acromioplasty    Surgeon(s):  Gayla Baca MD    Assistant:   * No surgical staff found *    Anesthesia: General    Estimated Blood Loss (mL): less than 50     Complications: None    Specimens:   * No specimens in log *    Implants:  Implant Name Type Inv. Item Serial No.  Lot No. LRB No. Used Action   SYSTEM IMPL DST BICEPS REP DEL W/ BICEPS BTTN 7X10MM PEEK - [id number]  SYSTEM IMPL DST BICEPS REP DEL W/ BICEPS BTTN 7X10MM PEEK  [employer]- [id number] Left 1 Implanted         Drains: * No LDAs found *    Findings: There is evidence of high-grade tearing and chronic inflammatory changes present along the intra-articular portion of the biceps tendon and its attachment site of the superior labrum. There is evidence of mild degenerative joint disease of the articular surfaces of the glenohumeral joint. There is evidence of partial-thickness intra-articular tearing of the rotator cuff deemed to be less than 50% thickness    Detailed Description of Procedure: The patient was identified the preoperative area and site was marked. He was taken back to the operating room placed supine on the table. A regional nerve block was placed by the anesthesia department in the preoperative area.   After induction of general endotracheal anesthesia, he was placed semiupright in beachchair position with his head neck in neutral.  Timeout was performed and preoperative antibiotics were given. Examination under anesthesia revealed full passive range of motion of the shoulder with no instability. A posterior portal was established with 11 blade followed by blunt trocar and arthroscope was inserted into the shoulder joint. Next an anterior portal established under direct visualization with 11 blade followed by blunt trocar and probe was inserted. Diagnostic arthroscopy was carried out. There is evidence of high-grade partial tearing and degenerative changes present along the intra-articular portion of the biceps tendon. There are chronic changes present at the superior aspect of the glenoid with peelback of the superior labrum and areas of chronic degenerative areas of the articular cartilage at the superior aspect of the glenoid adjacent to the proximal biceps tendon attachment. There was evidence of mild articular cartilage wear at the central portions of the glenohumeral joint with no evidence of deep lesions or advanced articular cartilage loss. The rotator cuff was probed and found to have partial-thickness tearing along the intra-articular attachment site of the greater tuberosity however there were no full-thickness or identified deep lesions or tears of the rotator cuff. The subscapularis tendon was identified and probed and found to be intact. Next an arthroscopic shaver was used to perform a debridement along the torn areas of the anterior superior and posterior aspects of the labrum. The biceps tendon was detached from its attachment site of the labrum with a cautery device. The remaining aspects of the labrum appeared healthy and stable. The arthroscopic shaver was used to clear out loose frayed articular cartilage from the superior aspect of the glenoid at the biceps tendon attachment site. Next attention was taken to the subacromial space and the posterior portal was redirected.   An arthroscopic shaver and cautery device were used to perform a bursectomy. There was extensive inflammatory bursal tissue present and this was fully resected to better visualize the rotator cuff. There is prominence of the anterior aspect of the acromion with a spur formation and the cautery device was used to release areas of the coracoacromial ligament and then the shaver was used to perform an acromioplasty removing prominent areas of the undersurface of the acromion. The rotator cuff was fully visualized and probed in its entirety at the greater tuberosity and there was no evidence of high-grade lesions or full-thickness tears. No rotator cuff repair was necessary at this stage. Passive range of motion following the debridement showed no further impingement of the rotator cuff. Next attention was taken to the biceps tenodesis. A longitudinal incision was made at the anterior aspect of the arm for a subpectoral biceps tenodesis. Dissection was carried down through subcutaneous tissues and the deltoid was retracted. The biceps tendon was identified in its groove and a Bovie was used to incise the biceps tendon sheath. The tendon was delivered into the wound and freed from proximal adhesions. The tendon was trimmed at its proximal aspect in preparation for the tenodesis. An Arthrex fiber loop was used past suture through the proximal biceps tendon. A guidepin was placed through the groove to the posterior aspect of the cortex of the humerus. The tendon was sized and the reamer was taken over the proximal cortex with a 6 and half millimeter reamer. The 2 limbs of fiber wire were were passed through the biceps button and the button was delivered through the bone tunnel and foot nicely at the posterior cortex of the humerus.   The limbs were pulled cinching the biceps tendon into the proximal humerus at the bone tunnel and a free needle was used to pass the suture limbs through the tendon in the groove and a knot was tied securing the biceps tendon and appropriate tension and length. The arm was taken through full range of motion and there was excellent stability present at the tenodesis site. The wound was thoroughly irrigated and then the incision was closed in layers using buried 2-0 Vicryl suture. A sterile dressing was applied with Xeroform, 4 x 8's, ABD, and foam tape. He was placed in a sling extubated and taken to PACU in stable condition. All sponge and needle counts were correct. Postoperative plan:  He will be discharged home in a sling and plan will be to follow-up in 2 weeks for suture removal we will proceed with physical therapy. He is okay to do gentle range of motion exercises as pain allows.     Electronically signed by Christie López MD on 1/25/2023 at 3:08 PM

## 2023-01-25 NOTE — PROGRESS NOTES
Pt brought to PACU for pre procedural block. Monitors applied, alarms on. Report obtained from [facility] [place] from [facility]. Pt alert and oriented. Time out performed with Dr. Marva Carranza and [facility] CRNA.

## 2023-01-25 NOTE — PROGRESS NOTES
1233 Patient arrived to Pacu, monitors placed and alarms on. [facility] from [facility]. Patient drowsy from anesthesia but arouses easily. Denies any complaints. Left shoulder dressing clean and dry, black sling intact, patient able to wiggle fingers. [phone] Patient more wakeful, taking ice chips. Denies any complaints. States left arm is still numb.    1300 patient turned and repositioned, tolerated well. Denies any complaints. Awaiting Dr. Chandni Richter post op orders. 1330 Dr. Chandni Richter orders received. 1335 Patient transported to [facility]. Patient's partial and glasses given to patient.   Report given to [facility].

## 2023-01-25 NOTE — DISCHARGE INSTRUCTIONS
Shoulder scope debridement. Discharge instructions    Keep the dressings in place for 1-2 days. You may remove the dressings after surgery and shower. It is okay to let the incisions get wet and then pat them dry after bathing. Place Band-Aids over the incision sites. You may leave the incisions open to air once there is no further drainage from your incision. Your rotator cuff was not fully torn and no repair was necessary. The biceps tendon was repaired at the shoulder    Use your sling for comfort and protection. You may remove it to practice gentle range of motion activities beginning the day after surgery. Practice using circular motions of the shoulder and then advance to reaching  up to 90 degrees. Okay to do light movements as pain allows. No lifting pushing pulling or carrying. You may discontinue use of your sling at follow up. Call office if any concerns for infection or wound healing problems. Patient arrived back to [facility]. Report given to this nurse from . ... Patient A&O  . [facility]  [phone]    Do not drive, work around [address] [place] [address] or use equipment. Do not drink any alcoholic beverages. Do not smoke while alone. Avoid making important decisions. Plan to spend a quiet, relaxed evening @ home. Resume normal activities as you begin to feel better. Eat lightly for your first meal, then gradually increase your diet to what is normal for you. In case of nausea, avoid food and drink only clear liquids. Resume food as nausea ceases. Notify your surgeon if you experience fever, chills, large amount of bleeding, difficulty breathing, persistent nausea and vomiting or any other disturbing problem. Call for a follow-up appointment with your surgeon.

## 2023-01-26 NOTE — ANESTHESIA POSTPROCEDURE EVALUATION
Department of Anesthesiology  Postprocedure Note    Patient: Mini Ken   MRN: [id number]  YOB: 1950  Date of evaluation: 1/26/2023      Procedure Summary     Date: 01/25/23 Room / Location: [address]    Anesthesia Start: 1030 Anesthesia Stop: 1234    Procedures:       LEFT SHOULDER ARTHROSCOPY  SUBACROMIAL DECOMPRESSION (Left: Shoulder)      LEFT BICEPS TENODESIS (Left: Shoulder) Diagnosis:       Partial nontraumatic rupture of rotator cuff, left      Primary osteoarthritis of left shoulder      Bicipital tendinitis of left shoulder      Chronic pain in left shoulder      Shoulder impingement syndrome, left      (Partial nontraumatic rupture of rotator cuff, left [M75.112])      (Primary osteoarthritis of left shoulder [M19.012])      (Bicipital tendinitis of left shoulder [M75.22])      (Chronic pain in left shoulder [M25.512, G89.29])      (Shoulder impingement syndrome, left [M75.42])    Surgeons: Jason Vera MD Responsible Provider: Bernard Vargas DO    Anesthesia Type: General ASA Status: 2          Anesthesia Type: General    Maris Phase I: Maris Score: 10    Maris Phase II: Maris Score: 10      Anesthesia Post Evaluation    Patient location during evaluation: PACU  Patient participation: complete - patient participated  Level of consciousness: sleepy but conscious  Airway patency: patent  Nausea & Vomiting: no nausea  Complications: no  Cardiovascular status: hemodynamically stable  Respiratory status: acceptable  Hydration status: euvolemic

## 2023-02-07 ENCOUNTER — OFFICE VISIT (OUTPATIENT)
Dept: ORTHOPEDIC SURGERY | Age: 73
End: 2023-02-07

## 2023-02-07 VITALS
OXYGEN SATURATION: 98 % | BODY MASS INDEX: 27.5 KG/M2 | RESPIRATION RATE: 16 BRPM | TEMPERATURE: 98.4 F | HEART RATE: 91 BPM | HEIGHT: 67 IN | WEIGHT: 175.2 LBS

## 2023-02-07 DIAGNOSIS — Z98.890 S/P RIGHT ROTATOR CUFF REPAIR: ICD-10-CM

## 2023-02-07 DIAGNOSIS — S46.112A STRAIN OF LONG HEAD OF LEFT BICEPS: ICD-10-CM

## 2023-02-07 DIAGNOSIS — Z98.890 S/P LEFT ROTATOR CUFF REPAIR: Primary | ICD-10-CM

## 2023-02-07 DIAGNOSIS — M67.80 TENDINOSIS: ICD-10-CM

## 2023-02-07 DIAGNOSIS — M67.814 BICEPS TENDINOSIS OF LEFT SHOULDER: ICD-10-CM

## 2023-02-07 PROCEDURE — 99024 POSTOP FOLLOW-UP VISIT: CPT | Performed by: PHYSICIAN ASSISTANT

## 2023-02-07 ASSESSMENT — ENCOUNTER SYMPTOMS
SHORTNESS OF BREATH: 0
CHEST TIGHTNESS: 0

## 2023-02-07 NOTE — PROGRESS NOTES
2/7/23  SUBJECTIVE:    Chief Complaint: Post op left shoulder arthroscopy with subpectoral biceps tenodesis, Left shoulder arthroscopic extensive debridement including torn degenerative labral tissue, subacromial decompression, and acromioplasty. DOS 01/25/23       History of Present Illness:  Tea Subramanian is a pleasant 67 y.o. male who presents today for post operative visit. DOS 01/25/23. Mr. Tea Subramanian is here in follow up regarding his left rotator cuff. He is doing rather well. He is having 1/10 pain. He reports he has been completing at home physical therapy recommended exercise and feels each day surgical area is improving. He denies chest pain, SOB, calf pain,fever,wound drainage, tenderness in the calf, and ankle swelling. No other issues. Review of Systems   Constitutional:  Negative for activity change, chills, fatigue and fever. Respiratory:  Negative for chest tightness and shortness of breath. Cardiovascular:  Negative for chest pain, palpitations and leg swelling. Musculoskeletal:  Arthralgias: Mild left shoulder and left bicep. Joint swelling: mild left shoulder. Skin:  Negative for pallor, rash and wound. Medical History:  Patient's medications, allergies, past medical, surgical, social and family histories were reviewed and updated as appropriate. OBJECTIVE:  Vitals:    02/07/23 0853   Pulse: 91   Resp: 16   Temp: 98.4 °F (36.9 °C)   SpO2: 98%     Physical Exam  Constitutional:       General: He is not in acute distress. Appearance: Normal appearance. He is not ill-appearing or toxic-appearing. Musculoskeletal:         General: Swelling: mild left shoulder, left bicep. Tenderness: mild left A/C joint and left bicep. Skin:     General: Skin is warm. Capillary Refill: Capillary refill takes less than 2 seconds. Findings: No erythema or rash. Bruising: mild, resolving post operative ecchymosis.   Neurological:      Mental Status: He is alert and oriented to person, place, and time. Sensory: No sensory deficit. Psychiatric:         Mood and Affect: Mood normal.         Behavior: Behavior normal.      Left shoulder and left bicep exam     Inspection:  Held in a normal posture. Normal contour at the acromioclavicular joint. Mild swelling, and mild/resolving ecchymosis, and mild erythema about the shoulder. Palpation: Mild global tenderness throughout the shoulder most significant at the anterior and superior aspect of the glenohumeral joint and mild tenderness in the bicipital groove. Range of Motion: Passive range on motion approximately 120 degrees and active range of motion forward elevation 90 degrees. There is mildly restricted range of motion with pain at the extremes of forward elevation and abduction. Strength: 4/5 supraspinatus, infraspinatus, and subscapularis muscle strength. Other findings: The skin is, pink, warm, dry and well perfused. 2+ radial pulse. Sensation is intact to light touch over the deltoid. Distally neurovascularly intact with brisk cap refill to fingers. - Arthroscopic Portals and surgical incisions: are healing well. No indication of infection. No drainage. No diffuse erythema. Benign without gross deformity    Diagnostic Testing:      Dr. Jeancarlos Small personally reviewed the intraoperative arthroscopic photos in the office today. All patient questions and concerns were addressed. Assessment:   Post operative left shoulder arthroscopy with subpectoral biceps tenodesis   Left shoulder arthroscopic extensive debridement including torn degenerative labral tissue, subacromial decompression, and acromioplasty     Plan:    -The patient is doing well after the arthroscopic surgery. We discussed advancing activities gradually. May sleep with sling to protect surgical area. But recommend to remove several times a day for elbow and wrist motion.  I have recommended physical therapy, order placed, patient will continue a home exercise program.   - I instructed them on stretching exercises and advancement of strengthening and endurance on the left shoulder and bicep. - Discussed the use of anti-inflammatory medications as needed. - Discussed potential signs of infection to look out for, including foul-smelling discharge from the wound, fevers, redness around the incisions, and to call us with any concerns  - Plan for physical therapy to start outpatient (order has been placed)   - We discussed the potential for residual pain, weakness, or stiffness at the shoulder despite appropriate surgical intervention. We discussed the goals of surgery to restore range of motion and strength which may take months to return. - No refill request during today's visit. - Pt is in agreement with this plan. All questions were answered to patient's satisfaction and was encouraged to call with any further questions. - F/U: The patient will follow-up here in 4 weeks for a recheck.

## 2023-02-07 NOTE — PATIENT INSTRUCTIONS
Begin physical therapy  May take Ibuprofen or Motrin as needed  Rest, ice, and elevate as needed  Follow up in 4 weeks

## 2023-02-07 NOTE — PROGRESS NOTES
Date of surgery:   January 25th  Surgeon: Dr. Chaz Olson    History:  Mr. Carlo Russell. is here in follow up regarding his left rotator cuff   He is doing rather well. He is having 1/10 pain. He denies chest pain, SOB, calf pain,fever,wound drainage, tenderness in the calf, and ankle swelling. No other issues.

## 2023-02-22 ENCOUNTER — HOSPITAL ENCOUNTER (OUTPATIENT)
Dept: PHYSICAL THERAPY | Age: 73
Setting detail: THERAPIES SERIES
Discharge: HOME | End: 2023-02-22
Payer: COMMERCIAL

## 2023-02-22 PROCEDURE — 97161 PT EVAL LOW COMPLEX 20 MIN: CPT

## 2023-02-22 PROCEDURE — 97110 THERAPEUTIC EXERCISES: CPT

## 2023-02-22 ASSESSMENT — PAIN DESCRIPTION - DESCRIPTORS: DESCRIPTORS: ACHING

## 2023-02-22 ASSESSMENT — PAIN SCALES - GENERAL: PAINLEVEL_OUTOF10: 2

## 2023-02-22 ASSESSMENT — PAIN DESCRIPTION - PAIN TYPE: TYPE: SURGICAL PAIN

## 2023-02-22 ASSESSMENT — PAIN DESCRIPTION - LOCATION: LOCATION: SHOULDER

## 2023-02-22 ASSESSMENT — PAIN DESCRIPTION - ORIENTATION: ORIENTATION: LEFT

## 2023-02-22 NOTE — PROGRESS NOTES
Physical Therapy: Initial Evaluation    Patient: Prosper Lin  (77 y.o. male)   Examination Date:   Plan of Care Certification Period: 2023 to        :  1950 ;    Confirmed: Yes MRN: [id number]  CSN: [id number]   Insurance: Payor: [employer] / Plan: [employer] / Product Type: *No Product type* /   Insurance ID: [id number] - (Medicare Managed) Secondary Insurance (if applicable):    Referring Physician: Johann Weinberg   PCP: Jared Botello MD Visits to Date/Visits Approved:   /      No Show/Cancelled Appts:   /       Medical Diagnosis: S/P right rotator cuff repair [Z98.890]  Strain of long head of left biceps [S46.112A] L shoulder decompression, biceps tenodesis  Treatment Diagnosis: L shoulder decompression , stiffness, pain, and weakness     PERTINENT MEDICAL HISTORY   Patient Assessed for Rehabilitation Services: Yes  Self reported health status[de-identified] Good    Medical History: Chart Reviewed: Yes   Past Medical History:   Diagnosis Date    Hyperlipidemia     Hypertension      Surgical History:   Past Surgical History:   Procedure Laterality Date    BICEPS TENDON REPAIR Left 2023    LEFT BICEPS TENODESIS performed by Simeon Rizzo MD at [facility]  [facility] ARTHROSCOPY Left 2023    LEFT SHOULDER ARTHROSCOPY  SUBACROMIAL DECOMPRESSION performed by Simeon Rizzo MD at [facility] OR       Medications:   Current Outpatient Medications:     ibuprofen (ADVIL;MOTRIN) 800 MG tablet, Take 1 tablet by mouth every 8 hours as needed for Pain, Disp: 60 tablet, Rfl: 1    Ascorbic Acid (VITAMIN C) 250 MG tablet, Take 250 mg by mouth daily, Disp: , Rfl:     atorvastatin (LIPITOR) 10 MG tablet, TAKE 1 TABLET BY MOUTH EVERY DAY, Disp: 90 tablet, Rfl: 1    VITAMIN D PO, Take by mouth, Disp: , Rfl:     FIBER PO, Take by mouth in the morning and at bedtime, Disp: , Rfl:     lisinopril (PRINIVIL;ZESTRIL) 30 MG tablet, TAKE 1 TABLET BY MOUTH EVERY DAY, Disp: 90 tablet, Rfl: 1    LORATADINE PO, Take 5 mg by mouth daily as needed, Disp: , Rfl:     vitamin B-12 (CYANOCOBALAMIN) 1000 MCG tablet, Take 1,000 mcg by mouth daily Pt states he is taking 2500 mcg, Disp: , Rfl:   Allergies: Seasonal      SUBJECTIVE EXAMINATION      ,           Subjective History: Onset Date: 01/25/23  Subjective: Pt state he has L shoulder surgery, decompression 1/25/23, he states that it is getting better each day and weeks, noting that he is doing his HEP and has been out of the sling for the past 2 weeks. he notes the pain is still present with lying on the L and dressing or reaching. He notes the Dr was happy with his progress to date. He states he is no longer taking any meds for the shoulder. He would like to get back to playing golf and reaching with the L arm. He reports the pain is anteriorly in the shoulder.   Additional Pertinent Hx (if applicable):     Prior diagnostic testing[de-identified]  (sp surgery decompression L shoulder)  Previous treatments prior to current episode?: Surgery      Learning/Language: Learning  Does the patient/guardian have any barriers to learning?: No barriers  What is the preferred language of the patient/guardian?: English  How does the patient/guardian prefer to learn new concepts?: Listening, Demonstration, Pictures/Videos     Pain Screening   Pain Screening  Patient Currently in Pain: Yes  Pain Assessment: 0-10  Pain Level: 2  Best Pain Level: 1  Worst Pain Level: 4  Pain Type: Surgical pain  Pain Location: Shoulder  Pain Orientation: Left  Pain Descriptors: Aching (catch occasional)    Functional Status    Dominant Hand: : Left    Social History:  Social History  Lives With: Alone  Type of Home: Apartment  Home Layout: One level    Occupation/Interests:  Occupation: Part time employment  Type of Occupation: [employer]  Job Duties: Repetitive lifting  Leisure & Hobbies: golf,  walk, housework, cooking    Prior Level of Function:  ind with golf and ADL , part time job [employer], some lifting but not heavy Independent        Current Level of Function:         ADL Assistance: Independent  Homemaking Assistance: Independent  Ambulation Assistance: Independent  Transfer Assistance: Independent  Active : Yes    OBJECTIVE EXAMINATION   Restrictions:             Review of Systems:  Vision: Within Functional Limits  Hearing: Within functional limits  Follows Commands: Within Functional Limits    Regional Screen:    Cervical Screen: neg    Observations:  General Observations  Description: mild rounded shoulders, tightness inthe B rhomboid and UT area    Palpation:   Left Shoulder Palpation: L biceps proximally , UT and rhomboid area R and L    Left AROM  Right AROM         AROM LUE (degrees)  L Shoulder Flexion (0-180): 130 mid pain  L Shoulder ABduction (0-180): 121 mild pain  L Shoulder Int Rotation  (0-70): L3  L Shoulder Ext Rotation  (0-90): C3    AROM RUE (degrees)  R Shoulder Flexion (0-180): 162  R Shoulder ABduction (0-180): 160  R Shoulder Int Rotation  (0-70): T9  R Shoulder Ext Rotation (0-90): T4     Left PROM  Right PROM         PROM LUE (degrees)  L Shoulder Flex  (0-180): 141  L Shoulder ABduction (0-180): 122  L Shoulder Int Rotation  (0-70): 60  L Shoulder Ext Rotation  (0-90): 45    PROM RUE (degrees)  R Shoulder Flex  (0-180): 166  R Shoulder ABduction (0-180): 162  R Shoulder Int Rotation  (0-70): 80  R Shoulder Ext Rotation  (0-90): 68     ASSESSMENT     Impression: Assessment: Pt had L shoulder decompression 1/25/23, he is doing very well at this time. He does remain with limited ROM both actively and passively and is having pain with limited reaching and strength. He does appear to be doing well with the understanding of HEP and symptomatic progression. He will benefit from therapy for manual mobility to the shoulder, PROM and TE for RC and scapular strength. 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 Pt would benefit from skilled therapy interventions to address listed impairments, progress toward goal completion and improve ADL/IADL status. PT also warranted to reduce risk for further injury or decline         Statement of Medical Necessity: Physical Therapy is both indicated and medically necessary as outlined in the POC to increase the likelihood of meeting the functionally related goals stated below. Patient's Activity Tolerance: Patient tolerated evaluation without incident      Patient's rehabilitation potential/prognosis is considered to be: Good    Factors which may impact rehabilitation potential include: None        GOALS   Patient Goal(s): get back to playing golf without shoulder pain  Short Term Goals Completed by 4 weeks Goal Status   1. ind with HEP for L shoulder AROM and reaching     2. L shoulder PROM flex and scap 150 ER 70     3. able to sleep comfortably on L side at night                                                   Long Term Goals Completed by 6 weeks Goal Status   1. ind with HEP for L shoulder strength to get back to golf, ADL and inventory work at [employer]     2. reports 75% better oveall since strting therapy     3. 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 AROM L shoulder Flex and Scap 160, behind back to T11, behind head to t4     5. Swing golf clubs comfortably     6.  Quick Dash improved to 10 pts or less                                  TREATMENT PLAN       Requires PT Follow-Up: Yes  Treatment Initiated : HEP including pendulums, cane ER , flex and scap, and scapular retraction  Specific Instructions for Next Treatment: cont per POC, L shoulder symptomaic progression, manual for shoulder ROM and STM, TE for strength in RC and scap areas    Pt. actively involved in establishing Plan of Care and Goals: Yes  Patient/ Caregiver education and instruction:           Patient received education on their current pathology and how their condition effects them with their functional activities. Patient understood discussion and questions were answered. Patient understands their activity limitations and understands rational for treatment progression. Treatment may include any combination of the following: Current Treatment Recommendations: Strengthening, ROM, Manual, Home exercise program     Frequency / Duration:  Patient to be seen 2 x per week for 4-6 weeks weeks      Chris Complexity:    Decision Making: Low Complexity  History: Personal Factors and/or Comorbidities Impacting POC: Low  Examination of body system(s) including body structures and functions, activity limitations, and/or participation restrictions: Low  Clinical Presentation: Low    PT Treatment Completed:  Pt completed 1 set of HEP in clinic with instruction per HEP sheet dated today. Appeared to understand program. Any questions clarified. Therapy Time  Individual Time In:        36  Individual Time Out:   0997  Minutes:   chris ( 30 ) TE ( 15)        Therapist Signature: Jonathan Vega PT    Date: 0/05/6798     I certify that the above Therapy Services are being furnished while the patient is under my care. I agree with the treatment plan and certify that this therapy is necessary. Physician's Signature:  ___________________________   Date:_______                                                                   Silvino Cao PA-C        Physician Comments: _______________________________________________    Please sign and return to [address]. Please fax to the location listed below.  Lisa Sheldon for this referral!    [address] [place] [address] [address] [id number]  Dept: [phone]  Dept Fax: [phone]  Loc: [phone]       POC NOTE

## 2023-02-22 NOTE — FLOWSHEET NOTE
Outpatient Physical Therapy  [place]           [x] Phone: [phone]   Fax: [phone]  [place]           [] Phone: [phone]   Fax: [phone]        Physical Therapy Daily Treatment Note  Date:  2023    Patient Name:  aMría Soto. :  1950  MRN: [id number]  Restrictions/Precautions: No data recorded      Diagnosis:   S/P right rotator cuff repair [Z98.890]  Strain of long head of left biceps [S46.112A] Diagnosis: L shoulder decompression, biceps tenodesis  Date of Injury/Surgery:   23  Treatment Diagnosis:  L shoulder decompression , stiffness, pain, and weakness  Insurance/Certification information: [facility]  Referring Physician:  Varghese Hurd   PCP: Kody Hahn MD  Next Doctor Visit:    3/7/23  Plan of care signed (Y/N):   N  Outcome Measure:   Visit# / total visits:   1 / 10-  Pain level: 2-4/10   Goals:     Patient goals: get back to playing golf without shoulder pain  Short term goals  Time Frame for Short term goals: 4 weeks  1. ind with HEP for L shoulder AROM and reaching  2. L shoulder PROM flex and scap 150 ER 70  3. able to sleep comfortably on L side at night        Long Term Goals  Time Frame for Long Term Goals: 6 weeks  1. ind with HEP for L shoulder strength to get back to golf, ADL and inventory work at [employer]  2. reports 75% better oveall since strting therapy  3. 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 AROM L shoulder Flex and Scap 160, behind back to T11, behind head to t4  5. Swing golf clubs comfortably  6. Quick Dash improved to 10 pts or less    Summary of Evaluation:  Assessment: Pt had L shoulder decompression 23, he is doing very well at this time. He does remain with limited ROM both actively and passively and is having pain with limited reaching and strength.  He does appear to be doing well with the understanding of HEP and symptomatic progression. He will benefit from therapy for manual mobility to the shoulder, PROM and TE for RC and scapular strength. 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 Pt would benefit from skilled therapy interventions to address listed impairments, progress toward goal completion and improve ADL/IADL status. PT also warranted to reduce risk for further injury or decline        Subjective:  See eval         Any changes in Ambulatory Summary Sheet? None        Objective:  See eval           Exercises: (No more than 4 columns)   Exercise/Equipment 2/2/23 #1 Date Date           WARM UP                     TABLE      Pendulums  4 way 1*15     Scap retraction 1*10     Cane flex, ER, and flex                     STANDING                                                     PROPRIOCEPTION                                    MODALITIES                      Other Therapeutic Activities/Education:   Patient received education on their current pathology and how their condition effects them with their functional activities. Patient understood discussion and questions were answered. Patient understands their activity limitations and understands rational for treatment progression. Home Exercise Program:  Pt completed 1 set of HEP in clinic with instruction per HEP sheet dated today. Appeared to understand program. Any questions clarified. Manual Treatments:   begin PROM and gentle manual mobes next session      Modalities:  -      Communication with other providers:  cert sent to [place] JEAN      Assessment:  (Response towards treatment session) (Pain Rating)  Assessment: Pt had L shoulder decompression 1/25/23, he is doing very well at this time. He does remain with limited ROM both actively and passively and is having pain with limited reaching and strength. He does appear to be doing well with the understanding of HEP and symptomatic progression.  He will benefit from therapy for manual mobility to the shoulder, PROM and TE for RC and scapular strength. 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 Pt would benefit from skilled therapy interventions to address listed impairments, progress toward goal completion and improve ADL/IADL status. PT also warranted to reduce risk for further injury or decline    Patient agrees with established plan of care and assisted in the development of their short term and long term goals. Patient had no adverse reaction with initial treatment and there are no barriers to learning. Demonstrates no mental or cognitive disorder.      Plan for Next Session:   Specific Instructions for Next Treatment: cont per POC, L shoulder symptomatic progression, manual for shoulder ROM and STM, TE for strength in RC and scap areas    Time In / Time Out:    2314-7964         Timed Code/Total Treatment Minutes:   eval (30 ) TE ( 15)      Next Progress Note due:   10 visit or 3/22/23      Plan of Care Interventions:  [x] Therapeutic Exercise  [x] Modalities: PRN  [] Therapeutic Activity     [] Ultrasound  [x] Estim  [] Gait Training      [] Cervical Traction [] Lumbar Traction  [x] Neuromuscular Re-education    [] Cold/hotpack [] Iontophoresis   [x] Instruction in HEP      [x] Vasopneumatic   [] Dry Needling    [x] Manual Therapy               [] Aquatic Therapy              Electronically signed by:  Sony Lombardi PT, 2/22/2023, 5:33 PM

## 2023-02-22 NOTE — PRE-CERTIFICATION NOTE
Outpatient Physical Therapy           [place]           [x] Phone: [phone]   Fax: [phone]  [facility]           [] Phone: [phone]   Fax: [phone]     To: LIBERTY Gonzalez   From: Mu Gregory, PT, PT     Patient: Isabel Fair. : 1950  Diagnosis: S/P right rotator cuff repair [Z98.890]  Strain of long head of left biceps [S46.112A] Diagnosis: L shoulder decompression, biceps tenodesis  Treatment Diagnosis: L shoulder decompression , stiffness, pain, and weakness  Date: 2023    Physical Therapy Certification/Re-Certification Form  Dear: Audelia JEAN  The following patient has been evaluated for physical therapy services and for therapy to continue, insurance requires physician review of the treatment plan initially and every 90 days. Please review the attached evaluation and/or summary of the patient's plan of care, and verify that you agree therapy should continue by signing the attached document and sending it back to our office. Assessment:    Assessment: Pt had L shoulder decompression 23, he is doing very well at this time. He does remain with limited ROM both actively and passively and is having pain with limited reaching and strength. He does appear to be doing well with the understanding of HEP and symptomatic progression. He will benefit from therapy for manual mobility to the shoulder, PROM and TE for RC and scapular strength. 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 Pt would benefit from skilled therapy interventions to address listed impairments, progress toward goal completion and improve ADL/IADL status.  PT also warranted to reduce risk for further injury or decline    Plan of Care/Treatment to date:  [x] Therapeutic Exercise  [x] Modalities: PRN  [] Therapeutic Activity     [] Ultrasound  [x] Electrical Stimulation  [] Gait Training      [] Cervical Traction [] Lumbar Traction  [x] Neuromuscular Re-education    [] Cold/hotpack [] Iontophoresis   [x] Instruction in HEP      [x] Vasopneumatic    [] Dry Needling  [x] Manual Therapy               [] Aquatic Therapy       Other:          Frequency/Duration:  # Days per week: [] 1 day # Weeks: [] 1 week [] 5 weeks     [x] 2 days   [] 2 weeks [x] 6 weeks     [] 3 days   [] 3 weeks [] 7 weeks     [] 4 days   [x] 4 weeks [] 8 weeks         [] 9 weeks [] 10 weeks         [] 11 weeks [] 12 weeks    Rehab Potential/Progress: [] Excellent [x] Good [] Fair  [] Poor     Goals:    Patient goals: get back to playing golf without shoulder pain  Short term goals  Time Frame for Short term goals: 4 weeks  1. ind with HEP for L shoulder AROM and reaching  2. L shoulder PROM flex and scap 150 ER 70  3. able to sleep comfortably on L side at night        Long Term Goals  Time Frame for Long Term Goals: 6 weeks  1. 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 reports 75% better oveall since strting therapy  3. 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 AROM L shoulder Flex and Scap 160, behind back to T11, behind head to t4  5. Swing golf clubs comfortably  6. Quick Dash improved to 10 pts or less      Electronically signed by:  Vicky Lee, PT, PT, 2/22/2023, 5:38 PM        If you have any questions or concerns, please don't hesitate to call.   Thank you for your referral.      Physician Signature:________________________________Date:_________ TIME: _____  By signing above, therapists plan is approved by physician

## 2023-02-24 ENCOUNTER — HOSPITAL ENCOUNTER (OUTPATIENT)
Dept: PHYSICAL THERAPY | Age: 73
Setting detail: THERAPIES SERIES
Discharge: HOME | End: 2023-02-24
Payer: COMMERCIAL

## 2023-02-24 PROCEDURE — 97110 THERAPEUTIC EXERCISES: CPT

## 2023-02-24 PROCEDURE — 97140 MANUAL THERAPY 1/> REGIONS: CPT

## 2023-02-24 NOTE — FLOWSHEET NOTE
Outpatient Physical Therapy  [place]           [x] Phone: [phone]   Fax: [phone]  [facility]           [] Phone: [phone]   Fax: [phone]        Physical Therapy Daily Treatment Note  Date:  2023    Patient Name:  Abida Sloan. :  1950  MRN: [id number]  Restrictions/Precautions: No data recorded   Diagnosis:   S/P right rotator cuff repair [Z98.890]  Strain of long head of left biceps [S46.112A] Diagnosis: L shoulder decompression, biceps tenodesis  Date of Injury/Surgery:   23  Treatment Diagnosis:  L shoulder decompression , stiffness, pain, and weakness  Insurance/Certification information: [facility]  Referring Physician:  Jesus Calderon Mc   PCP: Ragini Hurt MD  Next Doctor Visit:    3/7/23  Plan of care signed (Y/N):   N  Outcome Measure:   Visit# / total visits:   2 / 10-  Pain level:      2-4/10    Goals:     Patient goals: get back to playing golf without shoulder pain  Short term goals  Time Frame for Short term goals: 4 weeks  1. ind with HEP for L shoulder AROM and reaching  2. L shoulder PROM flex and scap 150 ER 70  3. able to sleep comfortably on L side at night  Long Term Goals  Time Frame for Long Term Goals: 6 weeks  1. 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 reports 75% better oveall since strting therapy  3. 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 AROM L shoulder Flex and Scap 160, behind back to T11, behind head to t4  5. Swing golf clubs comfortably  6. Quick Dash improved to 10 pts or less     Summary of Evaluation:  Assessment: Pt had L shoulder decompression 23, he is doing very well at this time. He does remain with limited ROM both actively and passively and is having pain with limited reaching and strength.  He does appear to be doing well with the understanding of HEP and symptomatic progression. He will benefit from therapy for manual mobility to the shoulder, PROM and TE for RC and scapular strength. 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 Pt would benefit from skilled therapy interventions to address listed impairments, progress toward goal completion and improve ADL/IADL status. PT also warranted to reduce risk for further injury or decline        Subjective:  tp states the shoulder is doing well, he does note that he may have does a little too much yesterday with this HEP and got a little more sore 5/10 but felt Ok again today 2/10. He notes he is dong better wth putting on his belt and with reaching across to his opposite shoulder also. Any changes in Ambulatory Summary Sheet?   None        Objective:      ( 2/24/23  L shoulder TTP anteriorly , biceps tendon area, mild pinch with end ROM Flex, mild GH hypomobility)        Exercises: (No more than 4 columns)  L shoulder decompression  Exercise/Equipment 2/2/23 #1 2/24/23 #2 Date           WARM UP         UBE  2 fwd/ 2 bck          TABLE      Pendulums  4 way 1*15 *10    Scap retraction 1*10 ---    Cane  ER  2*15    SL ER          Punch up  2*10    Supine ABC  *1    Prone HA      Prone  scap      Prone ext      STANDING      rows  Gtb 2*10    Ext  Gtb 2*10    Wall slide 3 way  *10 each    Seated pulley 3 way  *10 each    ER t-band  Yellow 2*10    IR t-band  Yellow 2*10               PROPRIOCEPTION                                    MODALITIES                      Other Therapeutic Activities/Education:   reviewed HEP and clarified questions , instructed to ice post HEP and increased activity      Home Exercise Program:  -  2/22/23 HEP including pendulums, cane ER , flex and scap, and scapular retraction      Manual Treatments:    PROM and gentle manual mobes next session      Modalities:  -      Communication with other providers:  cert sent to [place] [facility] 2/22/23      Assessment:  (Response towards treatment session) (Pain Rating)   Pt appears to be making good progress, he does still appear with some tightness in the R shoulder and GH joint, he remains with limited ROM and strength. He did tolerated TE and manual well today, 4/10 achy pain and fatigue post session. Patient agrees with established plan of care and assisted in the development of their short term and long term goals. Patient had no adverse reaction with initial treatment and there are no barriers to learning. Demonstrates no mental or cognitive disorder.      Plan for Next Session:   Specific Instructions for Next Treatment: cont per POC, L shoulder symptomatic progression, manual for shoulder ROM and STM, TE for strength in RC and scap areas  Add punch up , T-band IR and ER, wall slide flex next session     Time In / Time Out:    1113 -  1200          Timed Code/Total Treatment Minutes:     TE ( 30)    man ( 17 )       Next Progress Note due:   10 visit or 3/22/23      Plan of Care Interventions:  [x] Therapeutic Exercise  [x] Modalities: PRN  [] Therapeutic Activity     [] Ultrasound  [x] Estim  [] Gait Training      [] Cervical Traction [] Lumbar Traction  [x] Neuromuscular Re-education    [] Cold/hotpack [] Iontophoresis   [x] Instruction in HEP      [x] Vasopneumatic   [] Dry Needling    [x] Manual Therapy               [] Aquatic Therapy              Electronically signed by:  Desiree Powell PT, 2/24/2023, 7:29 AM

## 2023-03-03 ENCOUNTER — HOSPITAL ENCOUNTER (OUTPATIENT)
Dept: PHYSICAL THERAPY | Age: 73
Setting detail: THERAPIES SERIES
Discharge: HOME | End: 2023-03-03
Payer: COMMERCIAL

## 2023-03-03 PROCEDURE — 97110 THERAPEUTIC EXERCISES: CPT

## 2023-03-03 PROCEDURE — 97140 MANUAL THERAPY 1/> REGIONS: CPT

## 2023-03-03 NOTE — FLOWSHEET NOTE
Outpatient Physical Therapy  [place]           [x] Phone: [phone]   Fax: [phone]  [facility]           [] Phone: [phone]   Fax: [phone]        Physical Therapy Daily Treatment Note  Date:  3/3/2023    Patient Name:  Riky Bose. :  1950  MRN: [id number]  Restrictions/Precautions: No data recorded   Diagnosis:   S/P L rotator cuff decompression  Strain of long head of left biceps [S46.112A] Diagnosis: L shoulder decompression, biceps tenodesis  Date of Injury/Surgery:   23  Treatment Diagnosis:  L shoulder decompression , stiffness, pain, and weakness  Insurance/Certification information: [facility]  Referring Physician:  Arielle Muñoz   PCP: Dorita Mon MD  Next Doctor Visit:    3/7/23  Plan of care signed (Y/N):   N  Outcome Measure:  DASH   24pts  Visit# / total visits:   3 / 10-  Pain level:      2-4/10    Goals:     Patient goals: get back to playing golf without shoulder pain  Short term goals  Time Frame for Short term goals: 4 weeks  1. ind with HEP for L shoulder AROM and reaching  2. L shoulder PROM flex and scap 150 ER 70  3. able to sleep comfortably on L side at night  Long Term Goals  Time Frame for Long Term Goals: 6 weeks  1. ind with HEP for L shoulder strength to get back to golf, ADL and inventory work at [employer]  2. reports 75% better oveall since strting therapy  3. 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 AROM L shoulder Flex and Scap 160, behind back to T11, behind head to t4  5. Swing golf clubs comfortably  6. Quick Dash improved to 10 pts or less     Summary of Evaluation:  Assessment: Pt had L shoulder decompression 23, he is doing very well at this time. He does remain with limited ROM both actively and passively and is having pain with limited reaching and strength.  He does appear to be doing well with the understanding of HEP and symptomatic progression. He will benefit from therapy for manual mobility to the shoulder, PROM and TE for RC and scapular strength. 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 Pt would benefit from skilled therapy interventions to address listed impairments, progress toward goal completion and improve ADL/IADL status. PT also warranted to reduce risk for further injury or decline        Subjective:   pt states that he is doing very well with his HEP and notes that he is not having much pain at the moment. Noting 2/10 pain currently . He does note that he had a few days of greater soreness post last session 4/10 int he anterior shoulder. He notes he is reaching across his body and behind him better making wshing nd dressing and many ADLs easier and more comfortable      Any changes in Ambulatory Summary Sheet?   None        Objective:    ( 3/3/23  L shoulder  PROM  flex  157 ER  55  )    ( 2/24/23  L shoulder TTP anteriorly , biceps tendon area, mild pinch with end ROM Flex, mild GH hypomobility)        Exercises: (No more than 4 columns)  L shoulder decompression  Exercise/Equipment 2/2/23 #1 2/24/23 #2 #3 3/3/23            WARM UP         UBE  2 fwd/ 2 bck 4 min         TABLE      Pendulums  4 way 1*15 *10 hep   Scap retraction 1*10 ---    Cane  ER  2*15 hep   SL ER          Punch up  2*10 2*10   Supine ABC  *1 *1   Prone HA      Prone  scap      Prone ext      STANDING      rows  Gtb 2*10 Gtb 2*10   Ext  Gtb 2*10 Gtb  2*10   Wall slide 3 way  *10 each *1 each   Seated pulley 3 way  *10 each *10 3 way   ER t-band  Yellow 2*10 Yellow 2*10   IR t-band  Yellow 2*10 Yellow 2*10              PROPRIOCEPTION                                    MODALITIES                      Other Therapeutic Activities/Education:   added to HEP , corrected form with TE , benefit of icing post HEP and therapy when shoulder is a little more sore      Home Exercise Program:  -  2/22/23 HEP including pendulums, cane ER , flex and scap, and scapular retraction  3/3/23  added rows , ext ,  IR and ER with t- band    Manual Treatments:    PROM and gentle manual mobes next session      Modalities:  - deferred ice or vaso, will ice at home      Communication with other providers:      Assessment:  (Response towards treatment session) (Pain Rating)      Pt appears to be making good progress, he did have more pain for a few days post last session so no additional TE activity today . He is doing well with the HEP and appears to do well with the additions today.  Pain 4-5/10 in the anterior shoulder a most in clinic , 1/10 post session    Plan for Next Session:   Specific Instructions for Next Treatment: cont per POC, L shoulder symptomatic progression, manual for shoulder ROM and STM, TE for strength in RC and scap areas         Time In / Time Out:    910 748         Timed Code/Total Treatment Minutes:     TE ( 33)    man ( 15 )       Next Progress Note due:   due 3/17/23    Plan of Care Interventions:  [x] Therapeutic Exercise  [x] Modalities: PRN  [] Therapeutic Activity     [] Ultrasound  [x] Estim  [] Gait Training      [] Cervical Traction [] Lumbar Traction  [x] Neuromuscular Re-education    [] Cold/hotpack [] Iontophoresis   [x] Instruction in HEP      [x] Vasopneumatic   [] Dry Needling    [x] Manual Therapy               [] Aquatic Therapy              Electronically signed by:  Kathy Burns PT, 3/3/2023, 6:19 AM

## 2023-03-07 ENCOUNTER — OFFICE VISIT (OUTPATIENT)
Dept: ORTHOPEDIC SURGERY | Age: 73
End: 2023-03-07

## 2023-03-07 ENCOUNTER — HOSPITAL ENCOUNTER (OUTPATIENT)
Dept: PHYSICAL THERAPY | Age: 73
Setting detail: THERAPIES SERIES
Discharge: HOME | End: 2023-03-07
Payer: COMMERCIAL

## 2023-03-07 VITALS
OXYGEN SATURATION: 98 % | WEIGHT: 175 LBS | HEIGHT: 67 IN | BODY MASS INDEX: 27.47 KG/M2 | HEART RATE: 83 BPM | RESPIRATION RATE: 15 BRPM

## 2023-03-07 DIAGNOSIS — Z98.890 S/P LEFT ROTATOR CUFF REPAIR: Primary | ICD-10-CM

## 2023-03-07 PROCEDURE — 97140 MANUAL THERAPY 1/> REGIONS: CPT

## 2023-03-07 PROCEDURE — 99024 POSTOP FOLLOW-UP VISIT: CPT | Performed by: ORTHOPAEDIC SURGERY

## 2023-03-07 PROCEDURE — 97110 THERAPEUTIC EXERCISES: CPT

## 2023-03-07 NOTE — FLOWSHEET NOTE
Outpatient Physical Therapy  [place]           [x] Phone: [phone]   Fax: [phone]  [place]           [] Phone: [phone]   Fax: [phone]        Physical Therapy Daily Treatment Note  Date:  3/7/2023    Patient Name:  Ha Baca. :  1950  MRN: [id number]  Restrictions/Precautions: No data recorded   Diagnosis:   S/P L rotator cuff decompression  Strain of long head of left biceps [S46.112A] Diagnosis: L shoulder decompression, biceps tenodesis  Date of Injury/Surgery:   23  Treatment Diagnosis:  L shoulder decompression , stiffness, pain, and weakness  Insurance/Certification information: [facility]  Referring Physician:  Ambika Duran   PCP: Wilner Garcia MD  Next Doctor Visit:    3/7/23  Plan of care signed (Y/N):   N  Outcome Measure:  DASH   24pts  Visit# / total visits:   4 / 10-  Pain level:       /10    Goals:     Patient goals: get back to playing golf without shoulder pain  Short term goals  Time Frame for Short term goals: 4 weeks  1. ind with HEP for L shoulder AROM and reaching  2. L shoulder PROM flex and scap 150 ER 70  3. able to sleep comfortably on L side at night  Long Term Goals  Time Frame for Long Term Goals: 6 weeks  1. ind with HEP for L shoulder strength to get back to golf, ADL and inventory work at [email or web address]  2. reports 75% better oveall since strting therapy  3. 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 AROM L shoulder Flex and Scap 160, behind back to T11, behind head to t4  5. Swing golf clubs comfortably  6. Quick Dash improved to 10 pts or less     Summary of Evaluation:  Assessment: Pt had L shoulder decompression 23, he is doing very well at this time. He does remain with limited ROM both actively and passively and is having pain with limited reaching and strength.  He does appear to be doing well with the understanding of HEP and symptomatic progression. He will benefit from therapy for manual mobility to the shoulder, PROM and TE for RC and scapular strength. 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 Pt would benefit from skilled therapy interventions to address listed impairments, progress toward goal completion and improve ADL/IADL status. PT also warranted to reduce risk for further injury or decline        Subjective:  Pt arrives to tx session reporting 0/10 pain. States that he just came from Dr. Francia Santos office and he is happy with pt progress so far. States that per [facility] [employer] [facility] can start using light weights      Any changes in Ambulatory Summary Sheet?   None      Objective:      (3/7/23 L shoulder PROM flex 170)    ( 3/3/23  L shoulder  PROM  flex  157 ER  55  )    ( 2/24/23  L shoulder TTP anteriorly , biceps tendon area, mild pinch with end ROM Flex, mild GH hypomobility)    Exercises: (No more than 4 columns)  L shoulder decompression  Exercise/Equipment 2/2/23 #1 2/24/23 #2 #3 3/3/23  #4 3/7/23            WARM UP          UBE  2 fwd/ 2 bck 4 min 4'          TABLE       Pendulums  4 way 1*15 *10 hep    Scap retraction 1*10 ---     Cane  ER  2*15 hep    SL ER           Punch up  2*10 2*10 2x10   Supine ABC  *1 *1 x1   Prone HA    x10   Prone  scap    x10   Prone ext    x10   STANDING       rows  Gtb 2*10 Gtb 2*10 GTB 2x10   Ext  Gtb 2*10 Gtb  2*10 GTB 2x10   Wall slide 3 way  *10 each *1 each X10 ea way   Seated pulley 3 way  *10 each *10 3 way X10 ea way   ER t-band  Yellow 2*10 Yellow 2*10 2x10 YTB   IR t-band  Yellow 2*10 Yellow 2*10 2x10 YTB               PROPRIOCEPTION                                          MODALITIES                         Other Therapeutic Activities/Education:   added to HEP , corrected form with TE , benefit of icing post HEP and therapy when shoulder is a little more sore      Home Exercise Program:  -  2/22/23 HEP including pendulums, cane ER , flex and scap, and scapular retraction  3/3/23  added rows , ext ,  IR and ER with t- band    Manual Treatments:    PROM and gentle manual mobes       Modalities:  - deferred ice or vaso, will ice at home      Communication with other providers:      Assessment:   Pt tolerates tx session well without adverse reactions or complications. Pt would benefit from skilled therapy interventions to address listed impairments, progress toward goal completion and improve ADL/IADL status.       Plan for Next Session:   Specific Instructions for Next Treatment: cont per POC, L shoulder symptomatic progression, manual for shoulder ROM and STM, TE for strength in RC and scap areas       Time In / Time Out:    9000 / 0934      Timed Code/Total Treatment Minutes:    29' / 34'    1 TE    1 Man      Next Progress Note due:   due 3/17/23    Plan of Care Interventions:  [x] Therapeutic Exercise  [x] Modalities: PRN  [] Therapeutic Activity     [] Ultrasound  [x] Estim  [] Gait Training      [] Cervical Traction [] Lumbar Traction  [x] Neuromuscular Re-education    [] Cold/hotpack [] Iontophoresis   [x] Instruction in HEP      [x] Vasopneumatic   [] Dry Needling    [x] Manual Therapy               [] Aquatic Therapy              Electronically signed by:  Francisca Messer PTA, 3/7/2023, 8:55 AM

## 2023-03-07 NOTE — PROGRESS NOTES
Patient returns to the office today for FU of the left shoulder RTCR/SAD DOS 1/25/23. Pt states pain today is a 0/10 overall he is doing well. He is working with therapy and is going well. Pt states he will have some discomfort raising his arm above his head.

## 2023-03-08 SDOH — ECONOMIC STABILITY: FOOD INSECURITY: WITHIN THE PAST 12 MONTHS, YOU WORRIED THAT YOUR FOOD WOULD RUN OUT BEFORE YOU GOT MONEY TO BUY MORE.: NEVER TRUE

## 2023-03-08 SDOH — ECONOMIC STABILITY: FOOD INSECURITY: WITHIN THE PAST 12 MONTHS, THE FOOD YOU BOUGHT JUST DIDN'T LAST AND YOU DIDN'T HAVE MONEY TO GET MORE.: NEVER TRUE

## 2023-03-08 SDOH — ECONOMIC STABILITY: TRANSPORTATION INSECURITY
IN THE PAST 12 MONTHS, HAS LACK OF TRANSPORTATION KEPT YOU FROM MEETINGS, WORK, OR FROM GETTING THINGS NEEDED FOR DAILY LIVING?: NO

## 2023-03-08 SDOH — ECONOMIC STABILITY: HOUSING INSECURITY
IN THE LAST 12 MONTHS, WAS THERE A TIME WHEN YOU DID NOT HAVE A STEADY PLACE TO SLEEP OR SLEPT IN A SHELTER (INCLUDING NOW)?: NO

## 2023-03-08 SDOH — ECONOMIC STABILITY: INCOME INSECURITY: HOW HARD IS IT FOR YOU TO PAY FOR THE VERY BASICS LIKE FOOD, HOUSING, MEDICAL CARE, AND HEATING?: NOT HARD AT ALL

## 2023-03-09 ENCOUNTER — TELEPHONE (OUTPATIENT)
Dept: FAMILY MEDICINE CLINIC | Age: 73
End: 2023-03-09

## 2023-03-09 ENCOUNTER — OFFICE VISIT (OUTPATIENT)
Dept: FAMILY MEDICINE CLINIC | Age: 73
End: 2023-03-09
Payer: COMMERCIAL

## 2023-03-09 VITALS
HEART RATE: 73 BPM | SYSTOLIC BLOOD PRESSURE: 146 MMHG | BODY MASS INDEX: 27.97 KG/M2 | WEIGHT: 178.2 LBS | OXYGEN SATURATION: 99 % | HEIGHT: 67 IN | DIASTOLIC BLOOD PRESSURE: 70 MMHG

## 2023-03-09 DIAGNOSIS — M10.9 GOUT OF BIG TOE: Primary | ICD-10-CM

## 2023-03-09 DIAGNOSIS — M10.9 GOUT OF BIG TOE: ICD-10-CM

## 2023-03-09 DIAGNOSIS — I10 ESSENTIAL HYPERTENSION: ICD-10-CM

## 2023-03-09 LAB — URIC ACID, SERUM: 4.7 MG/DL (ref 3.5–7.2)

## 2023-03-09 PROCEDURE — 3078F DIAST BP <80 MM HG: CPT | Performed by: PHYSICIAN ASSISTANT

## 2023-03-09 PROCEDURE — 99213 OFFICE O/P EST LOW 20 MIN: CPT | Performed by: PHYSICIAN ASSISTANT

## 2023-03-09 PROCEDURE — 3077F SYST BP >= 140 MM HG: CPT | Performed by: PHYSICIAN ASSISTANT

## 2023-03-09 PROCEDURE — 1123F ACP DISCUSS/DSCN MKR DOCD: CPT | Performed by: PHYSICIAN ASSISTANT

## 2023-03-09 PROCEDURE — 96372 THER/PROPH/DIAG INJ SC/IM: CPT | Performed by: PHYSICIAN ASSISTANT

## 2023-03-09 RX ORDER — METHYLPREDNISOLONE 4 MG/1
TABLET ORAL
Qty: 1 KIT | Refills: 0 | Status: SHIPPED | OUTPATIENT
Start: 2023-03-09

## 2023-03-09 RX ORDER — COLCHICINE 0.6 MG/1
TABLET ORAL
Qty: 2 TABLET | Refills: 2 | Status: SHIPPED | OUTPATIENT
Start: 2023-03-09 | End: 2023-03-09 | Stop reason: SDUPTHER

## 2023-03-09 RX ORDER — METHYLPREDNISOLONE ACETATE 40 MG/ML
40 INJECTION, SUSPENSION INTRA-ARTICULAR; INTRALESIONAL; INTRAMUSCULAR; SOFT TISSUE ONCE
Status: COMPLETED | OUTPATIENT
Start: 2023-03-09 | End: 2023-03-09

## 2023-03-09 RX ORDER — COLCHICINE 0.6 MG/1
TABLET ORAL
Qty: 3 TABLET | Refills: 2 | Status: SHIPPED | OUTPATIENT
Start: 2023-03-09

## 2023-03-09 RX ORDER — LISINOPRIL 30 MG/1
30 TABLET ORAL DAILY
Qty: 90 TABLET | Refills: 1 | Status: SHIPPED | OUTPATIENT
Start: 2023-03-09

## 2023-03-09 RX ADMIN — METHYLPREDNISOLONE ACETATE 40 MG: 40 INJECTION, SUSPENSION INTRA-ARTICULAR; INTRALESIONAL; INTRAMUSCULAR; SOFT TISSUE at 08:45

## 2023-03-09 NOTE — PROGRESS NOTES
3/9/2023    Don Mckeon. Chief Complaint   Patient presents with    Toe Pain     Right foot , large toe ,  red , swollen , pt reports very painful x's 4 days . Pt states has used OTC meds , Voltaren gel with no relief        HPI  History was obtained from patient. Silvia Cesar is a 67 y.o. male who presents today with complaints of right great toe redness, swelling, and pain for 4 days. It is localized to this area, does not involve the foot or ankle. Pain has gradually worsened. There has been no known injury. Pain is worse with any ROM and with weightbearing. Voltaren gel seemed to make it worse. No history of gout or any forms of arthritis. Patient is otherwise in his normal baseline state of health. Denies any general ill feeling. Denies nausea, vomiting, tachycardia, fever or chills. No change in diet. Patient does admit to drinking 1 beer each day with his lunch. He does consume a moderate amount of red meat weekly. Patient requests refill of lisinopril sent to [facility] as he is having difficulty receiving it from [facility] pharmacy who at this time is permanently closed.     PAST MEDICAL HISTORY  Past Medical History:   Diagnosis Date    Hyperlipidemia     Hypertension        FAMILY HISTORY  Family History   Problem Relation Age of Onset    Dementia Mother     Heart Failure Father     Cancer Sister         hysterectomy-current chemo 11/23/22    No Known Problems Sister     No Known Problems Sister        SOCIAL HISTORY  Social History     Socioeconomic History    Marital status:      Spouse name: None    Number of children: 2    Years of education: None    Highest education level: None   Occupational History     Comment: Retired   Tobacco Use    Smoking status: Some Days     Packs/day: 0.15     Years: 57.00     Pack years: 8.55     Types: Cigars, Cigarettes     Start date: 5/15/1965    Smokeless tobacco: Never    Tobacco comments:     Pt states he quit smoking cigs 30 yrs ago and did not smoke much then. States he will have a cigar on occasion, but not daily   Vaping Use    Vaping Use: Never used   Substance and Sexual Activity    Alcohol use:  Yes     Alcohol/week: 1.0 standard drink     Types: 1 Cans of beer per week     Comment: daily    Drug use: No     Social Determinants of Health     Financial Resource Strain: Low Risk     Difficulty of Paying Living Expenses: Not hard at all   Food Insecurity: No Food Insecurity    Worried About Running Out of Food in the Last Year: Never true    Ran Out of Food in the Last Year: Never true   Physical Activity: Sufficiently Active    Days of Exercise per Week: 5 days    Minutes of Exercise per Session: 30 min        SURGICAL HISTORY  Past Surgical History:   Procedure Laterality Date    BICEPS TENDON REPAIR Left 1/25/2023    LEFT BICEPS TENODESIS performed by Vin Hernandez MD at [address] [facility] ARTHROSCOPY Left 1/25/2023    LEFT SHOULDER ARTHROSCOPY  SUBACROMIAL DECOMPRESSION performed by Vin Hernandez MD at [address]  Current Outpatient Medications   Medication Sig Dispense Refill    ibuprofen (ADVIL;MOTRIN) 800 MG tablet Take 1 tablet by mouth every 8 hours as needed for Pain 60 tablet 1    Ascorbic Acid (VITAMIN C) 250 MG tablet Take 250 mg by mouth daily      atorvastatin (LIPITOR) 10 MG tablet TAKE 1 TABLET BY MOUTH EVERY DAY 90 tablet 1    VITAMIN D PO Take by mouth      FIBER PO Take by mouth in the morning and at bedtime      LORATADINE PO Take 5 mg by mouth daily as needed      vitamin B-12 (CYANOCOBALAMIN) 1000 MCG tablet Take 1,000 mcg by mouth daily Pt states he is taking 2500 mcg      methylPREDNISolone (MEDROL, SRAVAN,) 4 MG tablet Use as directed 1 kit 0    colchicine (COLCRYS) 0.6 MG tablet Take 2 tablets po now and one tablet po an hour later 2 tablet 2    lisinopril (PRINIVIL;ZESTRIL) 30 MG tablet Take 1 tablet by mouth daily 90 tablet 1     Current Facility-Administered Medications   Medication Dose Route Frequency Provider Last Rate Last Admin    methylPREDNISolone acetate (DEPO-MEDROL) injection 40 mg  40 mg IntraMUSCular Once Aminata Flynn PA-C           ALLERGIES  Allergies   Allergen Reactions    Seasonal        PHYSICAL EXAM    BP (!) 146/70   Pulse 73   Ht 5' 7\" (1.702 m)   Wt 178 lb 3.2 oz (80.8 kg)   SpO2 99%   BMI 27.91 kg/m²     Constitutional:  Well developed, well nourished  HENT:  Normocephalic, atraumatic  Eyes:  conjunctiva normal, no discharge, no scleral icterus  Skin/Musculoskeletal: Localized erythema, edema, warmth and exquisite tenderness to palpation of the right first MTP and IP joint. No breaks in the skin or skin rashes. Does not involve the foot or ankle. Full range of motion of this digit but with induced pain. Brisk cap refill. Good sensation. Pulses intact. Neurologic:  Alert & oriented   Psychiatric:  Affect normal, mood normal    ASSESSMENT & PLAN    Obie Garzon was seen today for toe pain. Diagnoses and all orders for this visit:    Gout of big toe  -     methylPREDNISolone acetate (DEPO-MEDROL) injection 40 mg  -     methylPREDNISolone (MEDROL, SRAVAN,) 4 MG tablet; Use as directed  -     colchicine (COLCRYS) 0.6 MG tablet; Take 2 tablets po now and one tablet po an hour later  -     Uric Acid; Future    Essential hypertension  -     lisinopril (PRINIVIL;ZESTRIL) 30 MG tablet; Take 1 tablet by mouth daily     Likely gout, check uric acid  Depo-Medrol 40 mg IM in office  Start Medrol Suzon Caprice in the morning  Colchicine as prescribed  Rest and elevate the foot  Apply ice intermittently throughout the day  If redness or pain spreads, call the office  Lisinopril refilled as patient has had difficulty with previous pharmacy-BP elevated in office today. Patient to monitor.   Elevation may be related to current level of pain    Medications Discontinued During This Encounter   Medication Reason    lisinopril (PRINIVIL;ZESTRIL) 30 MG tablet REORDER No follow-ups on file. Plan of care reviewed with patient who verbalizes understanding and wishes to continue. Please note that this chart was generated using dragon dictation software. Although every effort was made to ensure the accuracy of this automated transcription, some errors in transcription may have occurred.     Electronically signed by David Cuellar PA-C on 3/9/2023

## 2023-03-09 NOTE — TELEPHONE ENCOUNTER
Call [address] or send new script.  The quantity needs to be 3 for [place]

## 2023-03-10 ENCOUNTER — HOSPITAL ENCOUNTER (OUTPATIENT)
Dept: PHYSICAL THERAPY | Age: 73
Setting detail: THERAPIES SERIES
Discharge: HOME | End: 2023-03-10
Payer: COMMERCIAL

## 2023-03-10 PROCEDURE — 97140 MANUAL THERAPY 1/> REGIONS: CPT

## 2023-03-10 PROCEDURE — 97110 THERAPEUTIC EXERCISES: CPT

## 2023-03-10 RX ORDER — SULFAMETHOXAZOLE AND TRIMETHOPRIM 800; 160 MG/1; MG/1
1 TABLET ORAL 2 TIMES DAILY
Qty: 20 TABLET | Refills: 0 | Status: SHIPPED | OUTPATIENT
Start: 2023-03-10 | End: 2023-03-20

## 2023-03-10 NOTE — FLOWSHEET NOTE
Outpatient Physical Therapy  [place]           [x] Phone: [phone]   Fax: [phone]  [facility]           [] Phone: [phone]   Fax: [phone]        Physical Therapy Daily Treatment Note  Date:  3/10/2023    Patient Name:  Caitlin Becker. :  1950  MRN: [id number]  Restrictions/Precautions: No data recorded   Diagnosis:   S/P L rotator cuff decompression  Strain of long head of left biceps [S46.112A] Diagnosis: L shoulder decompression, biceps tenodesis  Date of Injury/Surgery:   23  Treatment Diagnosis:  L shoulder decompression , stiffness, pain, and weakness  Insurance/Certification information: [facility]  Referring Physician:  Alfredo William   PCP: Janessa Hazel MD  Next Doctor Visit:    3/7/23  Plan of care signed (Y/N):   N  Outcome Measure:  DASH   24pts  Visit# / total visits:   5 / 10-  Pain level:      0 /10    Goals:     Patient goals: get back to playing golf without shoulder pain  Short term goals  Time Frame for Short term goals: 4 weeks  1. ind with HEP for L shoulder AROM and reaching         MET  2. L shoulder PROM flex and scap 150 ER 70  3. able to sleep comfortably on L side at night  Long Term Goals  Time Frame for Long Term Goals: 6 weeks  1. 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 reports 75% better oveall since strting therapy  3. 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 AROM L shoulder Flex and Scap 160, behind back to T11, behind head to t4  5. Swing golf clubs comfortably  6. Quick Dash improved to 10 pts or less     Summary of Evaluation:  Assessment: Pt had L shoulder decompression 23, he is doing very well at this time. He does remain with limited ROM both actively and passively and is having pain with limited reaching and strength.  He does appear to be doing well with the understanding of HEP and symptomatic progression. He will benefit from therapy for manual mobility to the shoulder, PROM and TE for RC and scapular strength. 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 Pt would benefit from skilled therapy interventions to address listed impairments, progress toward goal completion and improve ADL/IADL status. PT also warranted to reduce risk for further injury or decline        Subjective:   Pt states that hiss shoulder is feeling good and has sharyn for a few days, he notes that he feel his ROM is improving and he is progressing nicely . He reports the Dr was happy with progress at his follow up on Tue. He does also not that he jsut started a steroid taper for his toe that flared up last Sunday. He reports that is much improved also. He reports feeing 70 % better but still trouble reaching behind his back and across his body      Any changes in Ambulatory Summary Sheet?   None      Objective:    ( 3/10/23  L shoulder   PROM ER 69  IR  73 )     (3/7/23 L shoulder PROM flex 170)  ( 3/3/23  L shoulder  PROM  flex  157 ER  55  )  ( 2/24/23  L shoulder TTP anteriorly , biceps tendon area, mild pinch with end ROM Flex, mild GH hypomobility)    Exercises: (No more than 4 columns)  L shoulder decompression  Exercise/Equipment 2/24/23 #2 #3 3/3/23  #4 3/7/23 #5 3/10/23            WARM UP          UBE 2 fwd/ 2 bck 4 min 4' 4 min          TABLE       Pendulums  4 way *10 hep     Scap retraction ---      Cane  ER 2*15 hep     SL ER           Punch up 2*10 2*10 2x10 2# 2*10   Supine ABC *1 *1 x1 2# *1   Prone HA   x10 1*10  2#   Prone  scap   x10 1*10 2#   Prone ext   x10 1*10 2#   STANDING       rows Gtb 2*10 Gtb 2*10 GTB 2x10 Blue 2*10    Ext Gtb 2*10 Gtb  2*10 GTB 2x10 Blue 2*10   Wall slide 3 way *10 each *1 each X10 ea way *10 each   Seated pulley 3 way *10 each *10 3 way X10 ea way ---   ER t-band Yellow 2*10 Yellow 2*10 2x10 YTB Red 2 10    IR t-band Yellow 2*10 Yellow 2*10 2x10 YTB Red 2*10        Standing shoulder flex    2# 1*10   Standing shoulder scap    3# 1*10   Shoulder matrix >90       V combo    4#1*10   PROPRIOCEPTION                                          MODALITIES                         Other Therapeutic Activities/Education:    added to HEP , corrected form with TE , discussed progress and possible symptoms coverage due to the steroid taper for his toe . Home Exercise Program:  -  2/22/23 HEP including pendulums, cane ER , flex and scap, and scapular retraction  3/3/23  added rows , ext ,  IR and ER with t- band  3/10/23 added prone Flex,  pone HA     Manual Treatments:    PROM and gentle manual mobes       Modalities:  - deferred ice or vaso, will ice at home      Communication with other providers:      Assessment:    Pt appears to have tolerated session well, noting only mild soreness and muscle fatigue. He does appear with good form with additions to HEP but required some cueing for ER and IR form in the clinic. 4/10 pain post session with added elevation activity and resistance    Plan for Next Session:   Specific Instructions for Next Treatment: cont per POC, L shoulder symptomatic progression, manual for shoulder ROM and STM, TE for strength in RC and scap areas.  Possible d/c 3/24/23        Time In / Time Out:     390- 047      Timed Code/Total Treatment Minutes:        2 TE ( 35 )   1 Man  ( 12)       Next Progress Note due:   due 3/17/23    Plan of Care Interventions:  [x] Therapeutic Exercise  [x] Modalities: PRN  [] Therapeutic Activity     [] Ultrasound  [x] Estim  [] Gait Training      [] Cervical Traction [] Lumbar Traction  [x] Neuromuscular Re-education    [] Cold/hotpack [] Iontophoresis   [x] Instruction in HEP      [x] Vasopneumatic   [] Dry Needling    [x] Manual Therapy               [] Aquatic Therapy              Electronically signed by:  Zacarias Dutta PT, 3/10/2023, 6:00 AM

## 2023-03-13 NOTE — PROGRESS NOTES
3/7/2023   Chief Complaint   Patient presents with    Post-Op Check     Left shoulder DOS 1/25/23        History of Present Illness:                             Irvin Castro is a 67 y.o. male who returns today for follow-up of his left shoulder rotator cuff repair. He is very pleased with his progress and feels the shoulder is coming along nicely with therapy. No complaints or issues today. Patient returns to the office today for FU of the left shoulder RTCR/SAD DOS 1/25/23. Pt states pain today is a 0/10 overall he is doing well. He is working with therapy and is going well. Pt states he will have some discomfort raising his arm above his head. Medical History  Patient's medications, allergies, past medical, surgical, social and family histories were reviewed and updated as appropriate. Review of Systems                                            Examination:  General Exam:  Vitals: Pulse 83   Resp 15   Ht 5' 7\" (1.702 m)   Wt 175 lb (79.4 kg)   SpO2 98%   BMI 27.41 kg/m²    Physical Exam       Well-healed surgical scars. There is painless active range of motion present of the arm with good intact elevation and abduction as well as internal and external rotation. Mildly restricted range of motion with mild discomfort at the extremes. Rotator cuff is functioning well against gravity and light resistance. Office Procedures:  No orders of the defined types were placed in this encounter. Assessment and Plan  1. Status post left shoulder rotator cuff repair and biceps tenodesis    He is making good progress with his therapy and I have encouraged him to continue advancing with resistance bands and light weights. He should continue to avoid any strenuous or painful or heavy activities for another 6 weeks.     Follow-up in 6 weeks for check of his progress and we will discuss advancement of his activities        Electronically signed by Arturo Gleason MD on 3/13/2023 at 11:05 AM

## 2023-03-14 ENCOUNTER — HOSPITAL ENCOUNTER (OUTPATIENT)
Dept: PHYSICAL THERAPY | Age: 73
Setting detail: THERAPIES SERIES
Discharge: HOME | End: 2023-03-14
Payer: COMMERCIAL

## 2023-03-14 PROCEDURE — 97140 MANUAL THERAPY 1/> REGIONS: CPT

## 2023-03-14 PROCEDURE — 97110 THERAPEUTIC EXERCISES: CPT

## 2023-03-14 NOTE — FLOWSHEET NOTE
Outpatient Physical Therapy  [place]           [x] Phone: [phone]   Fax: [phone]  [facility]           [] Phone: [phone]   Fax: [phone]        Physical Therapy Daily Treatment Note  Date:  3/14/2023    Patient Name:  Galindo Fernandez. :  1950  MRN: [id number]  Restrictions/Precautions: No data recorded   Diagnosis:   S/P L rotator cuff decompression  Strain of long head of left biceps [S46.112A] Diagnosis: L shoulder decompression, biceps tenodesis  Date of Injury/Surgery:   23  Treatment Diagnosis:  L shoulder decompression , stiffness, pain, and weakness  Insurance/Certification information: [facility]  Referring Physician:  Juan Scott   PCP: Elias Victoria MD  Next Doctor Visit:    3/7/23  Plan of care signed (Y/N):   N  Outcome Measure:  DASH   24pts  Visit# / total visits:   6 / 10-12  Pain level:      0 /10    Goals:     Patient goals: get back to playing golf without shoulder pain  Short term goals  Time Frame for Short term goals: 4 weeks  1. ind with HEP for L shoulder AROM and reaching         MET  2. L shoulder PROM flex and scap 150 ER 70  3. able to sleep comfortably on L side at night  Long Term Goals  Time Frame for Long Term Goals: 6 weeks  1. ind with HEP for L shoulder strength to get back to golf, ADL and inventory work at [employer]  2. reports 75% better oveall since strting therapy  3. 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 AROM L shoulder Flex and Scap 160, behind back to T11, behind head to t4  5. Swing golf clubs comfortably  6. Quick Dash improved to 10 pts or less     Summary of Evaluation:  Assessment: Pt had L shoulder decompression 23, he is doing very well at this time. He does remain with limited ROM both actively and passively and is having pain with limited reaching and strength.  He does appear to be doing well with the understanding of HEP and symptomatic progression. He will benefit from therapy for manual mobility to the shoulder, PROM and TE for RC and scapular strength. 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 Pt would benefit from skilled therapy interventions to address listed impairments, progress toward goal completion and improve ADL/IADL status. PT also warranted to reduce risk for further injury or decline        Subjective:   Pt continuing to note improvement. Sleeping well and now even able to sleep some on his left shld. Still having some trouble reaching behind his back but is improving. Any changes in Ambulatory Summary Sheet?   None      Objective:    (3/14/23) Passive L shld ER scap plane = 75 deg, flex = 165 deg    ( 3/10/23  L shoulder   PROM ER 69  IR  73 )   (3/7/23 L shoulder PROM flex 170)  ( 3/3/23  L shoulder  PROM  flex  157 ER  55  )  ( 2/24/23  L shoulder TTP anteriorly , biceps tendon area, mild pinch with end ROM Flex, mild GH hypomobility)    Exercises: (No more than 4 columns)  L shoulder decompression  Exercise/Equipment #4 3/7/23 #5 3/10/23 3/14/23 #6           WARM UP         UBE 4' 4 min 2'/2'         TABLE      Pendulums  4 way      Scap retraction      Cane  ER      SL ER          Punch up 2x10 2# 2*10 2# 2*10   Supine ABC x1 2# *1 2# *1   Prone HA x10 1*10  2# 2# 1*10   Prone  scap x10 1*10 2# 2# 1*10   Prone ext x10 1*10 2# 2# 1*10   STANDING      rows GTB 2x10 Blue 2*10  Blue 2*10   Ext GTB 2x10 Blue 2*10 Blue 2*10   Wall slide 3 way X10 ea way *10 each 10x ea   Seated pulley 3 way X10 ea way ---    ER t-band 2x10 YTB Red 2 10  Red 2*10   IR t-band 2x10 YTB Red 2*10 Red 2*10        Standing shoulder flex  2# 1*10 2# 2*10   Standing shoulder scap  3# 1*10 3# 1*10   Shoulder matrix >90      V combo  4#1*10 4#1*5 fatigued   Ball behind back   2# ball 10x cw/ccw   PROPRIOCEPTION                                    MODALITIES                      Other Therapeutic Activities/Education: added to HEP , corrected form with TE , discussed progress and possible symptoms coverage due to the steroid taper for his toe . Home Exercise Program:  -  2/22/23 HEP including pendulums, cane ER , flex and scap, and scapular retraction  3/3/23  added rows , ext ,  IR and ER with t- band  3/10/23 added prone Flex,  pone HA     Manual Treatments:    PROM and gentle manual mobes       Modalities:  - deferred ice or vaso, will ice at home      Communication with other providers:      Assessment:    Pt demonstrated overall good tolerance to today's session noting only mild tenderness ant shld and muscle fatigue. Does require intermittent cueing for correct technique with some of exercises. Continue progressing strengthening as tolerated. End pain: 1/10 tenderness ant shld    Plan for Next Session:   Specific Instructions for Next Treatment: cont per POC, L shoulder symptomatic progression, manual for shoulder ROM and STM, TE for strength in RC and scap areas.  Possible d/c 3/24/23        Time In / Time Out:     0810/0850      Timed Code/Total Treatment Minutes:       40/40, (2) TE, (1) MT      Next Progress Note due:   due 3/17/23    Plan of Care Interventions:  [x] Therapeutic Exercise  [x] Modalities: PRN  [] Therapeutic Activity     [] Ultrasound  [x] Estim  [] Gait Training      [] Cervical Traction [] Lumbar Traction  [x] Neuromuscular Re-education    [] Cold/hotpack [] Iontophoresis   [x] Instruction in HEP      [x] Vasopneumatic   [] Dry Needling    [x] Manual Therapy               [] Aquatic Therapy              Electronically signed by:  Alma Brown 3/14/2023, 8:10 AM

## 2023-03-17 ENCOUNTER — HOSPITAL ENCOUNTER (OUTPATIENT)
Dept: PHYSICAL THERAPY | Age: 73
Setting detail: THERAPIES SERIES
Discharge: HOME | End: 2023-03-17
Payer: COMMERCIAL

## 2023-03-17 PROCEDURE — 97140 MANUAL THERAPY 1/> REGIONS: CPT

## 2023-03-17 PROCEDURE — 97110 THERAPEUTIC EXERCISES: CPT

## 2023-03-17 NOTE — PROGRESS NOTES
Outpatient Physical Therapy           [place]           [x] Phone: [phone]   Fax: [phone]  [facility]           [] Phone: [phone]   Fax: [phone]      To: Jules Hansen PA-C     From: Romy Greenwood, PT, PT     Patient: Jesica Tamez. : 1950  Diagnosis:  S/P right rotator cuff repair [Z98.890]  Strain of long head of left biceps [S46.112A]        Treatment Diagnosis:       Date: 3/17/2023  []  Progress Note                [x]  Discharge Note    Evaluation Date:   23  Total Visits to date:   7 Cancels/No-shows to date:  0    Subjective:  Pt states the shoulder is doing well overall and is making good progress he is only having a little pain 0-3/10 at most in the biceps are and the posterior scapular area. He report 80% better overall and sleeping well in bed and hs started to lightly swing golf clubs without pain. He feels he may be ready to d/c today and will do well with HEP.        Plan of Care/Treatment to date:  [] Therapeutic Exercise    [] Modalities:  [] Therapeutic Activity     [] Ultrasound  [] Electrical Stimulation  [] Gait Training      [] Cervical Traction   [] Lumbar Traction  [] Neuromuscular Re-education  [] Cold/hotpack [] Iontophoresis  [] Instruction in HEP      Other:  [] Manual Therapy       []  Vasopneumatic  [] Aquatic Therapy       []   Dry Needle Therapy                      Objective/Significant Findings At Last Visit/Comments:    L shoulder PROM   flex  172 scap  174 IR  74 ER 81,   MMT flex 4/5   scap 4/5   IR  4+/5 ER  4/5 , AROM flex  167 scap 167 behind head T5 behind back T11 , swing golf club no problems with 50% intensity        Assessment:   Pt demonstrated overall good tolerance to today's session noting mild fatigue, he did have mild pain 2-3/10 int he anterior shoulder but appears to be making good progress toward goals and is ind with HEP, he appears to desire to D/C and be able to progress with HEP only from this day.   End pain: 1-2/10 tenderness ant shoulder      Goal Status:  [] Achieved [x] Partially Achieved  [] Not Achieved           Frequency/Duration:  # Days per week: [] 1 day # Weeks: [] 1 week [] 4 weeks [] 8 weeks     [] 2 days   [] 2 weeks [] 5 weeks [] 10 weeks     [] 3 days   [] 3 weeks [] 6 weeks [] 12 weeks       Rehab Potential: [] Excellent [x] Good [] Fair  [] Poor         Patient Status: [] Continue per initial plan of Care     [x] Patient now discharged     [] Additional visits requested, Please re-certify for additional visits: If we are requesting more visits, we fully anticipate the patient's condition is expected to improve within the treatment timeframe we are requesting. Electronically signed by:  Leonel Martinez PT,  3/17/2023, 6:05 AM    If you have any questions or concerns, please don't hesitate to call.   Thank you for your referral.    Physician Signature:______________________ Date:______ Time: ________  By signing above, therapists plan is approved by physician

## 2023-03-17 NOTE — FLOWSHEET NOTE
Outpatient Physical Therapy  [place]           [x] Phone: [phone]   Fax: [phone]  [facility]           [] Phone: [phone]   Fax: [phone]        Physical Therapy Daily Treatment Note  Date:  3/17/2023    Patient Name:  Cedric Schirmer. :  1950  MRN: [id number]  Restrictions/Precautions: No data recorded   Diagnosis:   S/P L rotator cuff decompression  Strain of long head of left biceps [S46.112A] Diagnosis: L shoulder decompression, biceps tenodesis  Date of Injury/Surgery:   23  Treatment Diagnosis:  L shoulder decompression , stiffness, pain, and weakness  Insurance/Certification information: [facility]  Referring Physician:  Christian Gaston   PCP: Pamela Jarquin MD  Next Doctor Visit:    3/7/23  Plan of care signed (Y/N):   N  Outcome Measure:  DASH 15 pts    Visit# / total visits:   7 / 10-  Pain level:      0 /10    Goals:     Patient goals: get back to playing golf without shoulder pain  Short term goals  Time Frame for Short term goals: 4 weeks  1. ind with HEP for L shoulder AROM and reaching         MET  2. L shoulder PROM flex and scap 150 ER 70    met  3. able to sleep comfortably on L side at night   met  Long Term Goals  Time Frame for Long Term Goals: 6 weeks  1. 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 reports 75% better oveall since strting therapy      met  3. 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 AROM L shoulder Flex and Scap 160, behind back to T11, behind head to T4        met  5. Swing golf clubs comfortably     met  6. Quick Dash improved to 10 pts or less     Summary of Evaluation:  Assessment: Pt had L shoulder decompression 23, he is doing very well at this time. He does remain with limited ROM both actively and passively and is having pain with limited reaching and strength. He does appear to be doing well with the understanding of HEP and symptomatic progression. He will benefit from therapy for manual mobility to the shoulder, PROM and TE for RC and scapular strength. 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 Pt would benefit from skilled therapy interventions to address listed impairments, progress toward goal completion and improve ADL/IADL status. PT also warranted to reduce risk for further injury or decline        Subjective:   Pt states the shoulder is doing well overall and is making good progress he is only having a little pain 0-3/10 at most in the biceps are and the posterior scapular area. He report 80% better overall and sleeping well in bed and hs started to lightly swing golf clubs without pain. He feels he may be ready to d/c today and will do well with HEP. Any changes in Ambulatory Summary Sheet?   None      Objective:     ( 17/23  L shoulder PROM   flex  172 scap  174 IR  74 ER 81,   MMT flex 4/5   scap 4/5   IR  4+/5 ER  4/5 , AROM flex  167 scap 167 behind head T5 behind back T11 , swing golf club no problems with 50% intensity  )     (3/14/23) Passive L shld ER scap plane = 75 deg, flex = 165 deg   ( 3/10/23  L shoulder   PROM ER 69  IR  73 )   (3/7/23 L shoulder PROM flex 170)  ( 3/3/23  L shoulder  PROM  flex  157 ER  55  )  ( 2/24/23  L shoulder TTP anteriorly , biceps tendon area, mild pinch with end ROM Flex, mild GH hypomobility)    Exercises: (No more than 4 columns)  L shoulder decompression  Exercise/Equipment #4 3/7/23 #5 3/10/23 3/14/23 #6 #7 3/17/23            WARM UP          UBE 4' 4 min 2'/2' 4 min          TABLE       Pendulums  4 way       Scap retraction              Cane  ER       SL ER           Punch up 2x10 2# 2*10 2# 2*10 3# 2*10   Supine ABC x1 2# *1 2# *1 2# *1   Prone HA x10 1*10  2# 2# 1*10 2# *20   Prone  scap x10 1*10 2# 2# 1*10 2# *20   Prone ext x10 1*10 2# 2# 1*10 2# *20   STANDING       rows GTB 2x10 Blue 2*10  Blue 2*10 Blue 2*10   Ext GTB 2x10 Blue 2*10 Blue 2*10 Blue 2*10   Wall slide 3 way X10 ea way *10 each 10x ea    Seated pulley 3 way X10 ea way ---     ER t-band 2x10 YTB Red 2 10  Red 2*10 Red 2*10   IR t-band 2x10 YTB Red 2*10 Red 2*10 Red 2*10        Standing shoulder flex  2# 1*10 2# 2*10 3# 2*10   Standing shoulder scap  3# 1*10 3# 1*10 3# 2*10    Shoulder matrix >90       V combo  4#1*10 4#1*5 fatigued 4#  *15   Ball behind back   2# ball 10x cw/ccw    PROPRIOCEPTION                                          MODALITIES                         Other Therapeutic Activities/Education:   discussed the need to be diligent with HEP 5-6 days per week nd progress towrd goals for d/c today and 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 Home Exercise Program:  -  2/22/23 HEP including pendulums, cane ER , flex and scap, and scapular retraction  3/3/23  added rows , ext ,  IR and ER with t- band  3/10/23 added prone Flex,  pone HA     Manual Treatments:    PROM and gentle manual mobes       Modalities:  - deferred ice or vaso, will ice at home      Communication with other providers:   D/C sent 3/17/23    Assessment:     Pt demonstrated overall good tolerance to today's session noting mild fatigue, he did have mild pain 2-3/10 int he anterior shoulder but appears to be making good progress toward goals and is ind with HEP, he appears to desire to D/C and be able to progress with HEP only from this day.    End pain: 1-2/10 tenderness ant shoulder    Plan for Next Session:   D/c to HEP 3/17/23       Time In / Time Out:       700-  740    Timed Code/Total Treatment Minutes:       2TE ( 30 ) ,  1 Man ( 10)       Next Progress Note due:   due 4/17/23    Plan of Care Interventions:  [x] Therapeutic Exercise  [x] Modalities: PRN  [] Therapeutic Activity     [] Ultrasound  [x] Estim  [] Gait Training      [] Cervical Traction [] Lumbar Traction  [x] Neuromuscular Re-education    [] Cold/hotpack [] Iontophoresis   [x] Instruction in HEP      [x] Vasopneumatic   [] Dry Needling    [x] Manual Therapy               [] Aquatic Therapy              Electronically signed by:  Tammy Francois PT, 3/17/2023, 6:01 AM

## 2023-04-25 ENCOUNTER — OFFICE VISIT (OUTPATIENT)
Dept: ORTHOPEDIC SURGERY | Age: 73
End: 2023-04-25

## 2023-04-25 VITALS — TEMPERATURE: 98.1 F | HEART RATE: 59 BPM | OXYGEN SATURATION: 94 % | RESPIRATION RATE: 15 BRPM

## 2023-04-25 DIAGNOSIS — Z98.890 S/P LEFT ROTATOR CUFF REPAIR: Primary | ICD-10-CM

## 2023-04-25 NOTE — PATIENT INSTRUCTIONS
Continue to weight bear as tolerated  Continue range of motion  Ice and elevate as needed  Tylenol or Motrin for pain  Follow up as needed      We are committed to providing you the best care possible. If you receive a survey after visiting one of our offices, please take time to share your experience concerning your physician office visit. These surveys are confidential and no health information about you is shared. We are eager to improve for you and we are counting on your feedback to help make that happen.

## 2023-04-29 NOTE — PROGRESS NOTES
Patient seen in office today for: Lt RTCR/SAD/Biceps Tendonitis    DOI:  No injury   DOS: 1/25/2023  Date of last injection:  n/a     Patient denies decreased ROM. Patient reports 2/10 pain. RICE and medication are effective to alleviate pain and reduce swelling. Patient states he still has pain in his left arm and shoulder,     Patient has completed physical therapy and is still doing home exercises.      Profession:  Retired, but stays with part time job at [employer]
extremity in the median, ulnar, radial nerve distributions    Office Procedures:  No orders of the defined types were placed in this encounter. Assessment and Plan  1. Status post left shoulder arthroscopy with rotator cuff repair and biceps tenodesis    He is making good progress with his recovery process and has done well with his home exercise program.  I think encouraged him to continue building up strength especially with resistance bands and also light free weights in order to strengthen his biceps and rotator cuff. He will continue his activities and golf as tolerated. We discussed the healing process and the likelihood he will continue to improve with strength and endurance for a few more months.   He will follow-up here if there are any concerns or questions        Electronically signed by Maria Teresa Hernández MD on 4/29/2023 at 9:27 AM

## 2023-05-22 ENCOUNTER — TELEPHONE (OUTPATIENT)
Dept: FAMILY MEDICINE CLINIC | Age: 73
End: 2023-05-22

## 2023-05-22 RX ORDER — METHYLPREDNISOLONE 4 MG/1
TABLET ORAL
Qty: 1 KIT | Refills: 0 | Status: SHIPPED | OUTPATIENT
Start: 2023-05-22 | End: 2023-05-28

## 2023-05-22 RX ORDER — GABAPENTIN 100 MG/1
100 CAPSULE ORAL 2 TIMES DAILY
Qty: 20 CAPSULE | Refills: 0 | Status: SHIPPED | OUTPATIENT
Start: 2023-05-22 | End: 2023-06-01

## 2023-05-22 NOTE — TELEPHONE ENCOUNTER
Spoke to patient to reschedule appointment due to power outage. He states he has had back pain the last three days. Had an xray last January for this issue. Taking tylenol but it is not helping. Did not want to see anyone else. Would like to know if he can get something for pain until he sees you or if we can order imaging for his back. Please advise.

## 2023-05-22 NOTE — TELEPHONE ENCOUNTER
Okay to send in Rx for generic Medrol 4 mg Dosepak #1 take as directed with food /no refill and gabapentin 100 mg twice daily for 10 days/No refill.

## 2023-06-26 ENCOUNTER — OFFICE VISIT (OUTPATIENT)
Dept: FAMILY MEDICINE CLINIC | Age: 73
End: 2023-06-26
Payer: COMMERCIAL

## 2023-06-26 VITALS
DIASTOLIC BLOOD PRESSURE: 70 MMHG | HEART RATE: 60 BPM | WEIGHT: 173.8 LBS | OXYGEN SATURATION: 97 % | HEIGHT: 67 IN | BODY MASS INDEX: 27.28 KG/M2 | SYSTOLIC BLOOD PRESSURE: 110 MMHG

## 2023-06-26 DIAGNOSIS — I10 ESSENTIAL HYPERTENSION: ICD-10-CM

## 2023-06-26 DIAGNOSIS — E78.2 MIXED HYPERLIPIDEMIA: ICD-10-CM

## 2023-06-26 DIAGNOSIS — I10 ESSENTIAL HYPERTENSION: Primary | ICD-10-CM

## 2023-06-26 LAB
ALBUMIN SERPL-MCNC: 4.3 G/DL (ref 3.4–5)
ALBUMIN/GLOB SERPL: 1.7 {RATIO} (ref 1.1–2.2)
ALP SERPL-CCNC: 75 U/L (ref 40–129)
ALT SERPL-CCNC: 17 U/L (ref 10–40)
ANION GAP SERPL CALCULATED.3IONS-SCNC: 7 MMOL/L (ref 3–16)
AST SERPL-CCNC: 16 U/L (ref 15–37)
BILIRUB SERPL-MCNC: 0.4 MG/DL (ref 0–1)
BUN SERPL-MCNC: 16 MG/DL (ref 7–20)
CALCIUM SERPL-MCNC: 9.7 MG/DL (ref 8.3–10.6)
CHLORIDE SERPL-SCNC: 103 MMOL/L (ref 99–110)
CHOLEST SERPL-MCNC: 176 MG/DL (ref 0–199)
CO2 SERPL-SCNC: 30 MMOL/L (ref 21–32)
CREAT SERPL-MCNC: 1 MG/DL (ref 0.8–1.3)
DEPRECATED RDW RBC AUTO: 14.7 % (ref 12.4–15.4)
GFR SERPLBLD CREATININE-BSD FMLA CKD-EPI: >60 ML/MIN/{1.73_M2}
GLUCOSE SERPL-MCNC: 88 MG/DL (ref 70–99)
HCT VFR BLD AUTO: 45.2 % (ref 40.5–52.5)
HDLC SERPL-MCNC: 60 MG/DL (ref 40–60)
HGB BLD-MCNC: 14.8 G/DL (ref 13.5–17.5)
LDLC SERPL CALC-MCNC: 100 MG/DL
MCH RBC QN AUTO: 30.7 PG (ref 26–34)
MCHC RBC AUTO-ENTMCNC: 32.6 G/DL (ref 31–36)
MCV RBC AUTO: 94.1 FL (ref 80–100)
PLATELET # BLD AUTO: 219 K/UL (ref 135–450)
PMV BLD AUTO: 8.9 FL (ref 5–10.5)
POTASSIUM SERPL-SCNC: 5.2 MMOL/L (ref 3.5–5.1)
PROT SERPL-MCNC: 6.9 G/DL (ref 6.4–8.2)
RBC # BLD AUTO: 4.81 M/UL (ref 4.2–5.9)
SODIUM SERPL-SCNC: 140 MMOL/L (ref 136–145)
TRIGL SERPL-MCNC: 78 MG/DL (ref 0–150)
VLDLC SERPL CALC-MCNC: 16 MG/DL
WBC # BLD AUTO: 7.2 K/UL (ref 4–11)

## 2023-06-26 PROCEDURE — 0124A COVID-19, PFIZER BIVALENT, DO NOT DILUTE, (AGE 12Y+), IM, 30 MCG/0.3 ML: CPT | Performed by: FAMILY MEDICINE

## 2023-06-26 PROCEDURE — 3078F DIAST BP <80 MM HG: CPT | Performed by: FAMILY MEDICINE

## 2023-06-26 PROCEDURE — 3074F SYST BP LT 130 MM HG: CPT | Performed by: FAMILY MEDICINE

## 2023-06-26 PROCEDURE — 91312 COVID-19, PFIZER BIVALENT, DO NOT DILUTE, (AGE 12Y+), IM, 30 MCG/0.3 ML: CPT | Performed by: FAMILY MEDICINE

## 2023-06-26 PROCEDURE — 1123F ACP DISCUSS/DSCN MKR DOCD: CPT | Performed by: FAMILY MEDICINE

## 2023-06-26 PROCEDURE — 99214 OFFICE O/P EST MOD 30 MIN: CPT | Performed by: FAMILY MEDICINE

## 2023-06-26 ASSESSMENT — ENCOUNTER SYMPTOMS
CHEST TIGHTNESS: 0
BLOOD IN STOOL: 0
SHORTNESS OF BREATH: 0
DIARRHEA: 0
NAUSEA: 0
TROUBLE SWALLOWING: 0
ABDOMINAL PAIN: 0
VOMITING: 0
WHEEZING: 0
EYE PAIN: 0

## 2023-07-13 DIAGNOSIS — E78.2 MIXED HYPERLIPIDEMIA: ICD-10-CM

## 2023-07-13 RX ORDER — ATORVASTATIN CALCIUM 10 MG/1
TABLET, FILM COATED ORAL
Qty: 90 TABLET | Refills: 1 | Status: SHIPPED | OUTPATIENT
Start: 2023-07-13

## 2023-10-31 ENCOUNTER — OFFICE VISIT (OUTPATIENT)
Dept: FAMILY MEDICINE CLINIC | Age: 73
End: 2023-10-31
Payer: COMMERCIAL

## 2023-10-31 VITALS
RESPIRATION RATE: 18 BRPM | OXYGEN SATURATION: 96 % | SYSTOLIC BLOOD PRESSURE: 122 MMHG | HEIGHT: 67 IN | BODY MASS INDEX: 27.42 KG/M2 | DIASTOLIC BLOOD PRESSURE: 70 MMHG | HEART RATE: 60 BPM | WEIGHT: 174.7 LBS

## 2023-10-31 DIAGNOSIS — J30.9 ALLERGIC RHINITIS, UNSPECIFIED SEASONALITY, UNSPECIFIED TRIGGER: ICD-10-CM

## 2023-10-31 DIAGNOSIS — I10 ESSENTIAL HYPERTENSION: Primary | ICD-10-CM

## 2023-10-31 DIAGNOSIS — M25.562 PAIN IN LATERAL PORTION OF LEFT KNEE: ICD-10-CM

## 2023-10-31 DIAGNOSIS — E78.2 MIXED HYPERLIPIDEMIA: ICD-10-CM

## 2023-10-31 PROCEDURE — 1123F ACP DISCUSS/DSCN MKR DOCD: CPT | Performed by: FAMILY MEDICINE

## 2023-10-31 PROCEDURE — 3078F DIAST BP <80 MM HG: CPT | Performed by: FAMILY MEDICINE

## 2023-10-31 PROCEDURE — 3074F SYST BP LT 130 MM HG: CPT | Performed by: FAMILY MEDICINE

## 2023-10-31 PROCEDURE — 99213 OFFICE O/P EST LOW 20 MIN: CPT | Performed by: FAMILY MEDICINE

## 2023-10-31 ASSESSMENT — ENCOUNTER SYMPTOMS
SHORTNESS OF BREATH: 0
WHEEZING: 0
ABDOMINAL PAIN: 0
EYE PAIN: 0
VOMITING: 0
BLOOD IN STOOL: 0
DIARRHEA: 0
TROUBLE SWALLOWING: 0
CHEST TIGHTNESS: 0
NAUSEA: 0

## 2023-10-31 NOTE — PROGRESS NOTES
10/31/2023    Ml Hdz. Chief Complaint   Patient presents with    4 month    Knee Pain     Left knee pain. Iced Sunday night and it is fine. HPI  History was obtained from the patient. Krishan Baca is a 75 y.o. male who presents today with routine recheck. Alissa Nina has a history of hypertension, hyperlipidemia, mild allergies, and osteoarthritis. His chronic left shoulder pain is actually better. 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 His last labs were in June 2023 mood remains positive. Patient has had COVID and flu shot will consider RSV at pharmacy. Chest pain or increased shortness of breath. Meds are well-tolerated. No other intercurrent issues or changes. REVIEW OF SYMPTOMS    Review of Systems   Constitutional:  Negative for activity change and fatigue. HENT:  Negative for congestion, hearing loss, mouth sores and trouble swallowing. Eyes:  Negative for pain and visual disturbance. Respiratory:  Negative for chest tightness, shortness of breath and wheezing. Cardiovascular:  Negative for chest pain and palpitations. Patient with controlled hypertension and hyperlipidemia. Gastrointestinal:  Negative for abdominal pain, blood in stool, diarrhea, nausea and vomiting. Endocrine: Negative for polydipsia and polyuria. Genitourinary:  Negative for dysuria, frequency and urgency. Musculoskeletal:  Negative for arthralgias, gait problem and neck stiffness. Skin:  Negative for rash. Allergic/Immunologic: Positive for environmental allergies. Neurological:  Negative for dizziness, seizures, speech difficulty and weakness. Hematological:  Does not bruise/bleed easily. Psychiatric/Behavioral:  Negative for agitation, confusion, hallucinations and suicidal ideas. The patient is not nervous/anxious.         PAST

## 2023-11-26 SDOH — HEALTH STABILITY: PHYSICAL HEALTH: ON AVERAGE, HOW MANY MINUTES DO YOU ENGAGE IN EXERCISE AT THIS LEVEL?: 120 MIN

## 2023-11-26 SDOH — HEALTH STABILITY: PHYSICAL HEALTH: ON AVERAGE, HOW MANY DAYS PER WEEK DO YOU ENGAGE IN MODERATE TO STRENUOUS EXERCISE (LIKE A BRISK WALK)?: 5 DAYS

## 2023-11-26 ASSESSMENT — PATIENT HEALTH QUESTIONNAIRE - PHQ9
SUM OF ALL RESPONSES TO PHQ QUESTIONS 1-9: 0
SUM OF ALL RESPONSES TO PHQ9 QUESTIONS 1 & 2: 0
2. FEELING DOWN, DEPRESSED OR HOPELESS: 0
SUM OF ALL RESPONSES TO PHQ QUESTIONS 1-9: 0
SUM OF ALL RESPONSES TO PHQ QUESTIONS 1-9: 0
1. LITTLE INTEREST OR PLEASURE IN DOING THINGS: 0
SUM OF ALL RESPONSES TO PHQ QUESTIONS 1-9: 0

## 2023-11-26 ASSESSMENT — LIFESTYLE VARIABLES
HOW OFTEN DURING THE LAST YEAR HAVE YOU FAILED TO DO WHAT WAS NORMALLY EXPECTED FROM YOU BECAUSE OF DRINKING: NEVER
HAVE YOU OR SOMEONE ELSE BEEN INJURED AS A RESULT OF YOUR DRINKING: NO
HAS A RELATIVE, FRIEND, DOCTOR, OR ANOTHER HEALTH PROFESSIONAL EXPRESSED CONCERN ABOUT YOUR DRINKING OR SUGGESTED YOU CUT DOWN: 0
HOW OFTEN DO YOU HAVE A DRINK CONTAINING ALCOHOL: 5
HOW OFTEN DURING THE LAST YEAR HAVE YOU NEEDED AN ALCOHOLIC DRINK FIRST THING IN THE MORNING TO GET YOURSELF GOING AFTER A NIGHT OF HEAVY DRINKING: 0
HOW OFTEN DURING THE LAST YEAR HAVE YOU FOUND THAT YOU WERE NOT ABLE TO STOP DRINKING ONCE YOU HAD STARTED: NEVER
HOW OFTEN DO YOU HAVE A DRINK CONTAINING ALCOHOL: 4 OR MORE TIMES A WEEK
HOW MANY STANDARD DRINKS CONTAINING ALCOHOL DO YOU HAVE ON A TYPICAL DAY: 1 OR 2
HOW OFTEN DO YOU HAVE SIX OR MORE DRINKS ON ONE OCCASION: 1
HOW OFTEN DURING THE LAST YEAR HAVE YOU BEEN UNABLE TO REMEMBER WHAT HAPPENED THE NIGHT BEFORE BECAUSE YOU HAD BEEN DRINKING: 0
HOW OFTEN DURING THE LAST YEAR HAVE YOU BEEN UNABLE TO REMEMBER WHAT HAPPENED THE NIGHT BEFORE BECAUSE YOU HAD BEEN DRINKING: NEVER
HOW OFTEN DURING THE LAST YEAR HAVE YOU HAD A FEELING OF GUILT OR REMORSE AFTER DRINKING: NEVER
HOW OFTEN DURING THE LAST YEAR HAVE YOU FAILED TO DO WHAT WAS NORMALLY EXPECTED FROM YOU BECAUSE OF DRINKING: 0
HOW OFTEN DURING THE LAST YEAR HAVE YOU FOUND THAT YOU WERE NOT ABLE TO STOP DRINKING ONCE YOU HAD STARTED: 0
HOW MANY STANDARD DRINKS CONTAINING ALCOHOL DO YOU HAVE ON A TYPICAL DAY: 1
HAS A RELATIVE, FRIEND, DOCTOR, OR ANOTHER HEALTH PROFESSIONAL EXPRESSED CONCERN ABOUT YOUR DRINKING OR SUGGESTED YOU CUT DOWN: NO
HOW OFTEN DURING THE LAST YEAR HAVE YOU HAD A FEELING OF GUILT OR REMORSE AFTER DRINKING: 0
HAVE YOU OR SOMEONE ELSE BEEN INJURED AS A RESULT OF YOUR DRINKING: 0
HOW OFTEN DURING THE LAST YEAR HAVE YOU NEEDED AN ALCOHOLIC DRINK FIRST THING IN THE MORNING TO GET YOURSELF GOING AFTER A NIGHT OF HEAVY DRINKING: NEVER

## 2023-11-29 ENCOUNTER — TELEMEDICINE (OUTPATIENT)
Dept: FAMILY MEDICINE CLINIC | Age: 73
End: 2023-11-29
Payer: COMMERCIAL

## 2023-11-29 DIAGNOSIS — Z00.00 MEDICARE ANNUAL WELLNESS VISIT, SUBSEQUENT: Primary | ICD-10-CM

## 2023-11-29 PROCEDURE — G0439 PPPS, SUBSEQ VISIT: HCPCS | Performed by: FAMILY MEDICINE

## 2023-11-29 PROCEDURE — 1123F ACP DISCUSS/DSCN MKR DOCD: CPT | Performed by: FAMILY MEDICINE

## 2023-11-29 RX ORDER — IBUPROFEN 200 MG
200 TABLET ORAL PRN
COMMUNITY

## 2023-11-29 ASSESSMENT — PATIENT HEALTH QUESTIONNAIRE - PHQ9
SUM OF ALL RESPONSES TO PHQ QUESTIONS 1-9: 0
2. FEELING DOWN, DEPRESSED OR HOPELESS: 0
1. LITTLE INTEREST OR PLEASURE IN DOING THINGS: 0
SUM OF ALL RESPONSES TO PHQ9 QUESTIONS 1 & 2: 0
SUM OF ALL RESPONSES TO PHQ QUESTIONS 1-9: 0

## 2023-11-29 ASSESSMENT — LIFESTYLE VARIABLES
HOW OFTEN DURING THE LAST YEAR HAVE YOU FAILED TO DO WHAT WAS NORMALLY EXPECTED FROM YOU BECAUSE OF DRINKING: 0
HOW OFTEN DURING THE LAST YEAR HAVE YOU NEEDED AN ALCOHOLIC DRINK FIRST THING IN THE MORNING TO GET YOURSELF GOING AFTER A NIGHT OF HEAVY DRINKING: 0
HOW OFTEN DO YOU HAVE A DRINK CONTAINING ALCOHOL: 4 OR MORE TIMES A WEEK
HOW OFTEN DURING THE LAST YEAR HAVE YOU HAD A FEELING OF GUILT OR REMORSE AFTER DRINKING: 0
HAS A RELATIVE, FRIEND, DOCTOR, OR ANOTHER HEALTH PROFESSIONAL EXPRESSED CONCERN ABOUT YOUR DRINKING OR SUGGESTED YOU CUT DOWN: 0
HOW OFTEN DURING THE LAST YEAR HAVE YOU FOUND THAT YOU WERE NOT ABLE TO STOP DRINKING ONCE YOU HAD STARTED: 0
HOW OFTEN DURING THE LAST YEAR HAVE YOU BEEN UNABLE TO REMEMBER WHAT HAPPENED THE NIGHT BEFORE BECAUSE YOU HAD BEEN DRINKING: 0
HOW MANY STANDARD DRINKS CONTAINING ALCOHOL DO YOU HAVE ON A TYPICAL DAY: 1 OR 2
HAVE YOU OR SOMEONE ELSE BEEN INJURED AS A RESULT OF YOUR DRINKING: 0

## 2023-11-29 NOTE — PATIENT INSTRUCTIONS
Personalized Preventive Plan for Florinda Sandhoff - 11/29/2023  Medicare offers a range of preventive health benefits. Some of the tests and screenings are paid in full while other may be subject to a deductible, co-insurance, and/or copay. Some of these benefits include a comprehensive review of your medical history including lifestyle, illnesses that may run in your family, and various assessments and screenings as appropriate. After reviewing your medical record and screening and assessments performed today your provider may have ordered immunizations, labs, imaging, and/or referrals for you. A list of these orders (if applicable) as well as your Preventive Care list are included within your After Visit Summary for your review. Other Preventive Recommendations:    A preventive eye exam performed by an eye specialist is recommended every 1-2 years to screen for glaucoma; cataracts, macular degeneration, and other eye disorders. A preventive dental visit is recommended every 6 months. Try to get at least 150 minutes of exercise per week or 10,000 steps per day on a pedometer . Order or download the FREE \"Exercise & Physical Activity: Your Everyday Guide\" from [employer]. Call 7-483.778.2461 or search [employer] online. You need 4261-1214 mg of calcium and 2204-2143 IU of vitamin D per day. It is possible to meet your calcium requirement with diet alone, but a vitamin D supplement is usually necessary to meet this goal.  When exposed to the sun, use a sunscreen that protects against both UVA and UVB radiation with an SPF of 30 or greater. Reapply every 2 to 3 hours or after sweating, drying off with a towel, or swimming. Always wear a seat belt when traveling in a car. Always wear a helmet when riding a bicycle or motorcycle.

## 2023-11-29 NOTE — PROGRESS NOTES
Reactions    Seasonal      Prior to Visit Medications    Medication Sig Taking? Authorizing Provider   ibuprofen (ADVIL;MOTRIN) 200 MG tablet Take 1 tablet by mouth as needed for Pain Yes Alexis Lora MD   atorvastatin (LIPITOR) 10 MG tablet TAKE 1 TABLET BY MOUTH EVERY DAY Yes Tan Rosa MD   lisinopril (PRINIVIL;ZESTRIL) 30 MG tablet Take 1 tablet by mouth daily Yes Gunner Standard, PA-C   Ascorbic Acid (VITAMIN C) 250 MG tablet Take 1 tablet by mouth daily Yes Alexis Lora MD   VITAMIN D PO Take by mouth Yes Alexis Lora MD   FIBER PO Take by mouth in the morning and at bedtime Yes Alexis Lora MD   LORATADINE PO Take 5 mg by mouth daily as needed Yes Alexis Lora MD   vitamin B-12 (CYANOCOBALAMIN) 1000 MCG tablet Take 1 tablet by mouth daily Pt states he is taking 2500 mcg Yes Alexis Lora MD       CareTeam (Including outside providers/suppliers regularly involved in providing care):   Patient Care Team:  Tan Rosa MD as PCP - General (Family Medicine)  Tan Rosa MD as PCP - Empaneled Provider     Reviewed and updated this visit:  Allergies  Meds  Med Hx  Surg Hx  Soc Hx  Fam Hx      I, Mayte Amaya LPN, [id number][birth year]50, performed the documented evaluation under the direct supervision of the attending physician. Harsha oRca., was evaluated through a synchronous (real-time) audio encounter. The patient (or guardian if applicable) is aware that this is a billable service, which includes applicable co-pays. This Virtual Visit was conducted with patient's (and/or legal guardian's) consent. Patient identification was verified, and a caregiver was present when appropriate. The patient was located at Home: [address]  [address] [address]  Provider was located at [facility] ([address]): [address].  [address] [facility],  [address]  This encounter was performed under my, [address], [place],

## 2023-12-05 DIAGNOSIS — I10 ESSENTIAL HYPERTENSION: ICD-10-CM

## 2023-12-06 RX ORDER — LISINOPRIL 30 MG/1
30 TABLET ORAL DAILY
Qty: 90 TABLET | Refills: 0 | Status: SHIPPED | OUTPATIENT
Start: 2023-12-06

## 2024-01-05 DIAGNOSIS — E78.2 MIXED HYPERLIPIDEMIA: ICD-10-CM

## 2024-01-05 RX ORDER — ATORVASTATIN CALCIUM 10 MG/1
TABLET, FILM COATED ORAL
Qty: 90 TABLET | Refills: 1 | Status: SHIPPED | OUTPATIENT
Start: 2024-01-05

## 2024-03-03 DIAGNOSIS — I10 ESSENTIAL HYPERTENSION: ICD-10-CM

## 2024-03-04 RX ORDER — LISINOPRIL 30 MG/1
30 TABLET ORAL DAILY
Qty: 90 TABLET | Refills: 0 | Status: SHIPPED | OUTPATIENT
Start: 2024-03-04

## 2024-04-28 SDOH — ECONOMIC STABILITY: INCOME INSECURITY: HOW HARD IS IT FOR YOU TO PAY FOR THE VERY BASICS LIKE FOOD, HOUSING, MEDICAL CARE, AND HEATING?: NOT HARD AT ALL

## 2024-04-28 SDOH — ECONOMIC STABILITY: FOOD INSECURITY: WITHIN THE PAST 12 MONTHS, THE FOOD YOU BOUGHT JUST DIDN'T LAST AND YOU DIDN'T HAVE MONEY TO GET MORE.: NEVER TRUE

## 2024-04-28 SDOH — ECONOMIC STABILITY: FOOD INSECURITY: WITHIN THE PAST 12 MONTHS, YOU WORRIED THAT YOUR FOOD WOULD RUN OUT BEFORE YOU GOT MONEY TO BUY MORE.: NEVER TRUE

## 2024-04-28 ASSESSMENT — PATIENT HEALTH QUESTIONNAIRE - PHQ9
2. FEELING DOWN, DEPRESSED OR HOPELESS: NOT AT ALL
SUM OF ALL RESPONSES TO PHQ QUESTIONS 1-9: 0
2. FEELING DOWN, DEPRESSED OR HOPELESS: NOT AT ALL
1. LITTLE INTEREST OR PLEASURE IN DOING THINGS: NOT AT ALL
1. LITTLE INTEREST OR PLEASURE IN DOING THINGS: NOT AT ALL
SUM OF ALL RESPONSES TO PHQ9 QUESTIONS 1 & 2: 0
SUM OF ALL RESPONSES TO PHQ QUESTIONS 1-9: 0
SUM OF ALL RESPONSES TO PHQ QUESTIONS 1-9: 0
SUM OF ALL RESPONSES TO PHQ9 QUESTIONS 1 & 2: 0
SUM OF ALL RESPONSES TO PHQ QUESTIONS 1-9: 0

## 2024-05-01 ENCOUNTER — OFFICE VISIT (OUTPATIENT)
Dept: FAMILY MEDICINE CLINIC | Age: 74
End: 2024-05-01

## 2024-05-01 VITALS
HEART RATE: 44 BPM | SYSTOLIC BLOOD PRESSURE: 126 MMHG | HEIGHT: 67 IN | BODY MASS INDEX: 27.65 KG/M2 | WEIGHT: 176.2 LBS | RESPIRATION RATE: 16 BRPM | OXYGEN SATURATION: 99 % | DIASTOLIC BLOOD PRESSURE: 70 MMHG

## 2024-05-01 DIAGNOSIS — I10 ESSENTIAL HYPERTENSION: Primary | ICD-10-CM

## 2024-05-01 DIAGNOSIS — M19.012 OSTEOARTHRITIS, LOCALIZED, SHOULDER, LEFT: ICD-10-CM

## 2024-05-01 DIAGNOSIS — J30.9 ALLERGIC RHINITIS, UNSPECIFIED SEASONALITY, UNSPECIFIED TRIGGER: ICD-10-CM

## 2024-05-01 DIAGNOSIS — E78.2 MIXED HYPERLIPIDEMIA: ICD-10-CM

## 2024-05-01 DIAGNOSIS — I10 ESSENTIAL HYPERTENSION: ICD-10-CM

## 2024-05-01 LAB
ALBUMIN SERPL-MCNC: 4.5 G/DL (ref 3.4–5)
ALBUMIN/GLOB SERPL: 1.7 {RATIO} (ref 1.1–2.2)
ALP SERPL-CCNC: 72 U/L (ref 40–129)
ANION GAP SERPL CALCULATED.3IONS-SCNC: 8 MMOL/L (ref 3–16)
AST SERPL-CCNC: 17 U/L (ref 15–37)
BILIRUB SERPL-MCNC: 0.4 MG/DL (ref 0–1)
BUN SERPL-MCNC: 15 MG/DL (ref 7–20)
CALCIUM SERPL-MCNC: 9.6 MG/DL (ref 8.3–10.6)
CHLORIDE SERPL-SCNC: 101 MMOL/L (ref 99–110)
CHOLEST SERPL-MCNC: 162 MG/DL (ref 0–199)
CO2 SERPL-SCNC: 30 MMOL/L (ref 21–32)
CREAT SERPL-MCNC: 0.8 MG/DL (ref 0.8–1.3)
DEPRECATED RDW RBC AUTO: 13.8 % (ref 12.4–15.4)
GFR SERPLBLD CREATININE-BSD FMLA CKD-EPI: >90 ML/MIN/{1.73_M2}
GLUCOSE SERPL-MCNC: 81 MG/DL (ref 70–99)
HCT VFR BLD AUTO: 45.9 % (ref 40.5–52.5)
HDLC SERPL-MCNC: 64 MG/DL (ref 40–60)
HGB BLD-MCNC: 15.5 G/DL (ref 13.5–17.5)
LDLC SERPL CALC-MCNC: 84 MG/DL
MCH RBC QN AUTO: 30.8 PG (ref 26–34)
MCHC RBC AUTO-ENTMCNC: 33.9 G/DL (ref 31–36)
MCV RBC AUTO: 91 FL (ref 80–100)
PLATELET # BLD AUTO: 232 K/UL (ref 135–450)
PMV BLD AUTO: 8.7 FL (ref 5–10.5)
POTASSIUM SERPL-SCNC: 5.4 MMOL/L (ref 3.5–5.1)
PROT SERPL-MCNC: 7.2 G/DL (ref 6.4–8.2)
RBC # BLD AUTO: 5.04 M/UL (ref 4.2–5.9)
SODIUM SERPL-SCNC: 139 MMOL/L (ref 136–145)
TRIGL SERPL-MCNC: 72 MG/DL (ref 0–150)
VLDLC SERPL CALC-MCNC: 14 MG/DL
WBC # BLD AUTO: 7.5 K/UL (ref 4–11)

## 2024-05-01 ASSESSMENT — ENCOUNTER SYMPTOMS
NAUSEA: 0
BLOOD IN STOOL: 0
BACK PAIN: 1
DIARRHEA: 0
VOMITING: 0
CHEST TIGHTNESS: 0
ABDOMINAL PAIN: 0
WHEEZING: 0
SHORTNESS OF BREATH: 0
TROUBLE SWALLOWING: 0
EYE PAIN: 0

## 2024-05-01 NOTE — PROGRESS NOTES
Findings: No bruising.   Neurological:      General: No focal deficit present.      Mental Status: He is alert and oriented to person, place, and time. Mental status is at baseline.      Cranial Nerves: No cranial nerve deficit.      Motor: No weakness.   Psychiatric:         Mood and Affect: Mood normal.         Behavior: Behavior normal.         Thought Content: Thought content normal.         ASSESSMENT & PLAN     Diagnosis Orders   1. Essential hypertension  CBC    Comprehensive Metabolic Panel      2. Mixed hyperlipidemia  LIPID PANEL      3. Allergic rhinitis, unspecified seasonality, unspecified trigger        4. Osteoarthritis, localized, shoulder, left        Check CBC, lipid panel, and CMP and have him follow-up for these results.  Did give him a handicap placard letter for 5 years and advised to get RSV shot at pharmacy of choice.  Continue to work on healthy lifestyle with exercise as possible and maintenance of ideal body weight.  If rash worsens he is to let me know call with other changes or problems.    Return in about 6 months (around 11/1/2024).         Electronically signed by BETSY FLORES MD on 5/1/2024

## 2024-05-02 ENCOUNTER — TELEPHONE (OUTPATIENT)
Dept: FAMILY MEDICINE CLINIC | Age: 74
End: 2024-05-02

## 2024-05-03 ENCOUNTER — TELEPHONE (OUTPATIENT)
Dept: FAMILY MEDICINE CLINIC | Age: 74
End: 2024-05-03

## 2024-05-03 DIAGNOSIS — E87.5 HYPERKALEMIA: Primary | ICD-10-CM

## 2024-05-03 RX ORDER — LISINOPRIL 20 MG/1
20 TABLET ORAL DAILY
Qty: 30 TABLET | Refills: 0 | Status: SHIPPED | OUTPATIENT
Start: 2024-05-03

## 2024-05-03 NOTE — TELEPHONE ENCOUNTER
Is agreeable to the decrease of lisinopril. Per dr frias note med pended for signing.   Lab ordered placed.         ----- Message from Clayton Frias MD sent at 5/3/2024  8:55 AM EDT -----  Most likely secondary to lisinopril effect.  It is really not too much for problem overall but the only thing we can do is decrease the dose of the lisinopril back to 20 mg daily and monitor blood pressures and recheck potassium level in 3 to 4 weeks

## 2024-05-30 RX ORDER — LISINOPRIL 20 MG/1
20 TABLET ORAL DAILY
Qty: 30 TABLET | Refills: 5 | Status: SHIPPED | OUTPATIENT
Start: 2024-05-30

## 2024-06-20 ENCOUNTER — OFFICE VISIT (OUTPATIENT)
Dept: FAMILY MEDICINE CLINIC | Age: 74
End: 2024-06-20

## 2024-06-20 ENCOUNTER — PATIENT MESSAGE (OUTPATIENT)
Dept: FAMILY MEDICINE CLINIC | Age: 74
End: 2024-06-20

## 2024-06-20 VITALS
OXYGEN SATURATION: 95 % | HEART RATE: 44 BPM | DIASTOLIC BLOOD PRESSURE: 60 MMHG | HEIGHT: 67 IN | SYSTOLIC BLOOD PRESSURE: 102 MMHG | WEIGHT: 175.8 LBS | RESPIRATION RATE: 16 BRPM | BODY MASS INDEX: 27.59 KG/M2

## 2024-06-20 DIAGNOSIS — M19.91 PRIMARY OSTEOARTHRITIS, UNSPECIFIED SITE: ICD-10-CM

## 2024-06-20 DIAGNOSIS — M25.511 ACUTE PAIN OF RIGHT SHOULDER: Primary | ICD-10-CM

## 2024-06-20 RX ORDER — METHYLPREDNISOLONE 4 MG/1
TABLET ORAL
Qty: 1 KIT | Refills: 0 | Status: SHIPPED | OUTPATIENT
Start: 2024-06-20

## 2024-06-20 RX ORDER — MELOXICAM 15 MG/1
TABLET ORAL
Qty: 30 TABLET | Refills: 2 | Status: SHIPPED | OUTPATIENT
Start: 2024-06-20

## 2024-06-20 NOTE — TELEPHONE ENCOUNTER
From: Tobias Bailey Jr.  To: Dr. Clayton Mon  Sent: 6/20/2024 8:34 AM EDT  Subject: Back Pain    I am experiencing some pain in my back next to my right shoulder blade. I had this once before in 2022 and you referred me to the [facility] orthopedic doctor. They gave me a prescription for predisone which alleviated the pain.  Is there anyway you could call in a prescription or do I need to go see someone first?  Thanks in advance for your assistance!

## 2024-06-20 NOTE — TELEPHONE ENCOUNTER
Spoke to patient. States he has had acute right shoulder blade pain X 5 days. States he did have similar issue three years ago. Asking for prednisone. Made appointment with Janneth. Verified with Ashwin it was okay to schedule with her as this is an acute issue.

## 2024-06-20 NOTE — PROGRESS NOTES
(175 lb 12.8 oz)   SpO2 95%   BMI 27.53 kg/m²     Constitutional:  Well developed, well nourished.  No acute distress.  HENT:  Normocephalic, atraumatic  Eyes:  conjunctiva normal, no discharge, no scleral icterus  Cardiovascular:  Normal heart rate, normal rhythm, no murmurs, gallops or rubs  Thorax & Lungs:  Normal breath sounds, no respiratory distress, no wheezing, no rales, no rhonchi  Skin:  Warm, dry, no erythema, no rash  Extremities:  No edema, no cyanosis  Musculoskeletal:  Good range of motion all major joints, no tenderness to palpation or major deformities noted  Neurologic:  Alert & oriented   Psychiatric:  Affect normal, mood normal    ASSESSMENT & PLAN    Tobias was seen today for shoulder pain.    Diagnoses and all orders for this visit:    Acute pain of right shoulder  -     methylPREDNISolone (MEDROL, SRAVAN,) 4 MG tablet; Use as directed  -     meloxicam (MOBIC) 15 MG tablet; Take one tablet po once daily with food.  Avoid all other NSAIDs    Primary osteoarthritis, unspecified site       Trial meloxicam once daily with food.  Avoid all other NSAIDs  Trial of Medrol Dosepak  Okay to continue Tylenol as needed  Apply ice intermittently throughout the day  Consider topical Biofreeze as needed  Let me or Dr. Mon know if no improvement within the next 7 to 10 days as anticipated    Medications Discontinued During This Encounter   Medication Reason    lisinopril (PRINIVIL;ZESTRIL) 30 MG tablet DOSE ADJUSTMENT        No follow-ups on file.     Plan of care reviewed with patient who verbalizes understanding and wishes to continue.   Patient to call with any questions or concerns.                 Please note that this chart was generated using dragon dictation software.  Although every effort was made to ensure the accuracy of this automated transcription, some errors in transcription may have occurred.    Electronically signed by LENORA MOTT PA-C on 6/20/2024

## 2024-06-26 DIAGNOSIS — E87.5 HYPERKALEMIA: ICD-10-CM

## 2024-06-26 LAB — POTASSIUM SERPL-SCNC: 4.6 MMOL/L (ref 3.5–5.1)

## 2024-06-28 DIAGNOSIS — E78.2 MIXED HYPERLIPIDEMIA: ICD-10-CM

## 2024-06-28 RX ORDER — ATORVASTATIN CALCIUM 10 MG/1
TABLET, FILM COATED ORAL
Qty: 90 TABLET | Refills: 1 | Status: SHIPPED | OUTPATIENT
Start: 2024-06-28

## 2024-07-12 DIAGNOSIS — M25.511 ACUTE PAIN OF RIGHT SHOULDER: ICD-10-CM

## 2024-07-12 RX ORDER — MELOXICAM 15 MG/1
TABLET ORAL
Qty: 30 TABLET | Refills: 2 | OUTPATIENT
Start: 2024-07-12

## 2024-09-01 DIAGNOSIS — M25.511 ACUTE PAIN OF RIGHT SHOULDER: ICD-10-CM

## 2024-09-03 RX ORDER — MELOXICAM 15 MG/1
TABLET ORAL
Qty: 90 TABLET | Refills: 0 | Status: SHIPPED | OUTPATIENT
Start: 2024-09-03

## 2024-09-23 RX ORDER — LISINOPRIL 20 MG/1
20 TABLET ORAL DAILY
Qty: 90 TABLET | Refills: 1 | Status: SHIPPED | OUTPATIENT
Start: 2024-09-23

## 2024-11-04 ENCOUNTER — OFFICE VISIT (OUTPATIENT)
Dept: FAMILY MEDICINE CLINIC | Age: 74
End: 2024-11-04

## 2024-11-04 VITALS
HEIGHT: 67 IN | HEART RATE: 70 BPM | SYSTOLIC BLOOD PRESSURE: 124 MMHG | RESPIRATION RATE: 18 BRPM | DIASTOLIC BLOOD PRESSURE: 60 MMHG | OXYGEN SATURATION: 100 % | BODY MASS INDEX: 26.73 KG/M2 | WEIGHT: 170.3 LBS

## 2024-11-04 DIAGNOSIS — J30.9 ALLERGIC RHINITIS, UNSPECIFIED SEASONALITY, UNSPECIFIED TRIGGER: ICD-10-CM

## 2024-11-04 DIAGNOSIS — M54.9 MID BACK PAIN: ICD-10-CM

## 2024-11-04 DIAGNOSIS — L98.9 FACIAL SKIN LESION: ICD-10-CM

## 2024-11-04 DIAGNOSIS — E78.2 MIXED HYPERLIPIDEMIA: ICD-10-CM

## 2024-11-04 DIAGNOSIS — I10 ESSENTIAL HYPERTENSION: Primary | ICD-10-CM

## 2024-11-04 DIAGNOSIS — E87.5 HYPERKALEMIA: ICD-10-CM

## 2024-11-04 ASSESSMENT — ENCOUNTER SYMPTOMS
ABDOMINAL PAIN: 0
EYE PAIN: 0
VOMITING: 0
SHORTNESS OF BREATH: 0
WHEEZING: 0
BLOOD IN STOOL: 0
DIARRHEA: 0
TROUBLE SWALLOWING: 0
NAUSEA: 0
BACK PAIN: 1
CHEST TIGHTNESS: 0

## 2024-11-04 NOTE — PROGRESS NOTES
SURGERY Right 1958    SHOULDER ARTHROSCOPY Left 1/25/2023    LEFT SHOULDER ARTHROSCOPY  SUBACROMIAL DECOMPRESSION performed by Chaim Moreno MD at [facility] OR                 CURRENT MEDICATIONS  Current Outpatient Medications   Medication Sig Dispense Refill    lisinopril (PRINIVIL;ZESTRIL) 20 MG tablet Take 1 tablet by mouth daily 90 tablet 1    meloxicam (MOBIC) 15 MG tablet Take one tablet po once daily with food.  Avoid all other NSAIDs 90 tablet 0    atorvastatin (LIPITOR) 10 MG tablet TAKE 1 TABLET BY MOUTH EVERY DAY 90 tablet 1    Ascorbic Acid (VITAMIN C) 250 MG tablet Take 1 tablet by mouth daily      VITAMIN D PO Take by mouth      FIBER PO Take by mouth in the morning and at bedtime      LORATADINE PO Take 5 mg by mouth daily as needed      vitamin B-12 (CYANOCOBALAMIN) 1000 MCG tablet Take 1 tablet by mouth daily Pt states he is taking 2500 mcg      methylPREDNISolone (MEDROL, SRAVAN,) 4 MG tablet Use as directed (Patient not taking: Reported on 11/4/2024) 1 kit 0    ibuprofen (ADVIL;MOTRIN) 200 MG tablet Take 1 tablet by mouth as needed for Pain (Patient not taking: Reported on 11/4/2024)       No current facility-administered medications for this visit.       ALLERGIES  Allergies   Allergen Reactions    Seasonal        PHYSICAL EXAM    /60 (Site: Left Upper Arm, Position: Sitting, Cuff Size: Medium Adult)   Pulse 70   Resp 18   Ht 1.702 m (5' 7\")   Wt 77.2 kg (170 lb 4.8 oz)   SpO2 100%   BMI 26.67 kg/m²     Physical Exam  Vitals and nursing note reviewed.   Constitutional:       General: He is not in acute distress.     Appearance: Normal appearance. He is well-developed. He is not ill-appearing, toxic-appearing or diaphoretic.   HENT:      Head: Normocephalic and atraumatic.      Nose: Nose normal.      Mouth/Throat:      Mouth: Mucous membranes are moist.   Eyes:      Pupils: Pupils are equal, round, and reactive to light.   Cardiovascular:      Rate and Rhythm: Normal rate and regular

## 2024-11-06 ENCOUNTER — HOSPITAL ENCOUNTER (OUTPATIENT)
Age: 74
Discharge: HOME | End: 2024-11-06
Payer: COMMERCIAL

## 2024-11-06 ENCOUNTER — HOSPITAL ENCOUNTER (OUTPATIENT)
Dept: GENERAL RADIOLOGY | Age: 74
Discharge: HOME | End: 2024-11-06
Payer: COMMERCIAL

## 2024-11-06 DIAGNOSIS — M54.9 MID BACK PAIN: ICD-10-CM

## 2024-11-06 PROCEDURE — 72080 X-RAY EXAM THORACOLMB 2/> VW: CPT

## 2024-11-08 DIAGNOSIS — E87.5 HYPERKALEMIA: ICD-10-CM

## 2024-11-08 LAB — POTASSIUM SERPL-SCNC: 4.8 MMOL/L (ref 3.5–5.1)

## 2024-11-15 DIAGNOSIS — M54.9 MID BACK PAIN: Primary | ICD-10-CM

## 2024-11-15 DIAGNOSIS — I70.90 ARTERIAL CALCIFICATION: Primary | ICD-10-CM

## 2024-11-27 ENCOUNTER — INITIAL CONSULT (OUTPATIENT)
Dept: CARDIOLOGY CLINIC | Age: 74
End: 2024-11-27
Payer: COMMERCIAL

## 2024-11-27 VITALS
HEIGHT: 68 IN | WEIGHT: 172 LBS | BODY MASS INDEX: 26.07 KG/M2 | HEART RATE: 55 BPM | SYSTOLIC BLOOD PRESSURE: 152 MMHG | DIASTOLIC BLOOD PRESSURE: 64 MMHG

## 2024-11-27 DIAGNOSIS — I10 ESSENTIAL HYPERTENSION: Primary | ICD-10-CM

## 2024-11-27 DIAGNOSIS — I70.90 ARTERIAL CALCIFICATION: ICD-10-CM

## 2024-11-27 DIAGNOSIS — R06.02 SHORTNESS OF BREATH: ICD-10-CM

## 2024-11-27 DIAGNOSIS — E78.5 DYSLIPIDEMIA: ICD-10-CM

## 2024-11-27 DIAGNOSIS — Z72.0 NICOTINE ABUSE: ICD-10-CM

## 2024-11-27 PROCEDURE — 99204 OFFICE O/P NEW MOD 45 MIN: CPT | Performed by: INTERNAL MEDICINE

## 2024-11-27 PROCEDURE — 1123F ACP DISCUSS/DSCN MKR DOCD: CPT | Performed by: INTERNAL MEDICINE

## 2024-11-27 PROCEDURE — 93000 ELECTROCARDIOGRAM COMPLETE: CPT | Performed by: INTERNAL MEDICINE

## 2024-11-27 PROCEDURE — 3078F DIAST BP <80 MM HG: CPT | Performed by: INTERNAL MEDICINE

## 2024-11-27 PROCEDURE — 1159F MED LIST DOCD IN RCRD: CPT | Performed by: INTERNAL MEDICINE

## 2024-11-27 PROCEDURE — 3077F SYST BP >= 140 MM HG: CPT | Performed by: INTERNAL MEDICINE

## 2024-11-27 NOTE — PROGRESS NOTES
TROPONINT <0.010 03/27/2018     BNP:    Lab Results   Component Value Date    PROBNP 75.50 03/27/2018     PT/INR:  No results found for: \"PTINR\"  No results found for: \"LABA1C\"  Lab Results   Component Value Date    CHOL 162 05/01/2024    TRIG 72 05/01/2024    HDL 64 (H) 05/01/2024     Lab Results   Component Value Date    WBC 7.5 05/01/2024    HGB 15.5 05/01/2024    HCT 45.9 05/01/2024    MCV 91.0 05/01/2024     05/01/2024     TSH: No results found for: \"TSH\"  Lab Results   Component Value Date    AST 17 05/01/2024    ALT 16 05/01/2024    BILITOT 0.4 05/01/2024    ALKPHOS 72 05/01/2024         All labs, medications and tests reviewed by myself including data and history from outside source , patient and available family .      1. Essential hypertension    2. Shortness of breath    3. Arterial calcification    4. Dyslipidemia    5. Nicotine abuse         Impression and Plan:      Prominent arterial calcification noted on thoracolumbar spine, possible suggestive of aortic calcification  Essential hypertension  Mild dyspnea  Hyperlipidemia  Nicotine dependence: Patient smokes cigars, former smoker of cigarettes    Will obtain abdominal ultrasound to screen for AAA  Exercise stress test for risk stratification  Echocardiogram to rule out structural heart disease    Continue with statin therapy: Lipitor 10 mg daily.  Last LDL is 84, total cholesterol 162, triglycerides 72, HDL 64.  Blood pressure slightly elevated in office today 152/64, however patient mentioned that he is normotensive at home, prior blood pressure readings are also normal.  Will hold off on escalating therapy, recommend to closely monitor blood pressure and report to PCP or myself.          Return in about 1 month (around 12/27/2024).          Counseled extensively and medication compliance urged.  We discussed that for the  prevention of ASCVD our  goal is aggressive risk modification.Patient is encouraged to exercise even a brisk walk for 30

## 2024-12-02 DIAGNOSIS — M25.511 ACUTE PAIN OF RIGHT SHOULDER: ICD-10-CM

## 2024-12-02 RX ORDER — MELOXICAM 15 MG/1
TABLET ORAL
Qty: 90 TABLET | Refills: 0 | Status: SHIPPED | OUTPATIENT
Start: 2024-12-02

## 2024-12-06 ENCOUNTER — TELEPHONE (OUTPATIENT)
Dept: CARDIOLOGY CLINIC | Age: 74
End: 2024-12-06

## 2024-12-06 NOTE — TELEPHONE ENCOUNTER
Spoke with NP about this result as well as upcoming test and next OV. It was indicated to get the other test preformed and keep the OV already established       LMx1    Exercise stress test       ECG: Resting ECG demonstrates sinus bradycardia.    Stress Test: A Estuardo protocol stress test was performed. The patient was stressed for 4 min and 31 sec.           Abnormal exercise stress test indicating ST depression in inferior leads at maximal exercise from baseline.  No chest pain reported during exercise  Stress is limited by fatigue     Samuels score -1, medium cardiovascular risk     Recommend outpatient follow-up to discuss results

## 2024-12-09 SDOH — HEALTH STABILITY: PHYSICAL HEALTH: ON AVERAGE, HOW MANY DAYS PER WEEK DO YOU ENGAGE IN MODERATE TO STRENUOUS EXERCISE (LIKE A BRISK WALK)?: 5 DAYS

## 2024-12-09 SDOH — HEALTH STABILITY: PHYSICAL HEALTH: ON AVERAGE, HOW MANY MINUTES DO YOU ENGAGE IN EXERCISE AT THIS LEVEL?: 30 MIN

## 2024-12-09 ASSESSMENT — LIFESTYLE VARIABLES
HOW MANY STANDARD DRINKS CONTAINING ALCOHOL DO YOU HAVE ON A TYPICAL DAY: 1
HOW OFTEN DO YOU HAVE A DRINK CONTAINING ALCOHOL: 5
HOW OFTEN DO YOU HAVE SIX OR MORE DRINKS ON ONE OCCASION: 2
HOW OFTEN DURING THE LAST YEAR HAVE YOU HAD A FEELING OF GUILT OR REMORSE AFTER DRINKING: NEVER
HOW OFTEN DURING THE LAST YEAR HAVE YOU FAILED TO DO WHAT WAS NORMALLY EXPECTED FROM YOU BECAUSE OF DRINKING: NEVER
HOW OFTEN DO YOU HAVE A DRINK CONTAINING ALCOHOL: 4 OR MORE TIMES A WEEK
HOW OFTEN DURING THE LAST YEAR HAVE YOU HAD A FEELING OF GUILT OR REMORSE AFTER DRINKING: NEVER
HOW OFTEN DURING THE LAST YEAR HAVE YOU NEEDED AN ALCOHOLIC DRINK FIRST THING IN THE MORNING TO GET YOURSELF GOING AFTER A NIGHT OF HEAVY DRINKING: NEVER
HOW MANY STANDARD DRINKS CONTAINING ALCOHOL DO YOU HAVE ON A TYPICAL DAY: 1 OR 2
HOW OFTEN DURING THE LAST YEAR HAVE YOU FOUND THAT YOU WERE NOT ABLE TO STOP DRINKING ONCE YOU HAD STARTED: NEVER
HOW OFTEN DURING THE LAST YEAR HAVE YOU FOUND THAT YOU WERE NOT ABLE TO STOP DRINKING ONCE YOU HAD STARTED: NEVER
HOW OFTEN DURING THE LAST YEAR HAVE YOU BEEN UNABLE TO REMEMBER WHAT HAPPENED THE NIGHT BEFORE BECAUSE YOU HAD BEEN DRINKING: NEVER
HAVE YOU OR SOMEONE ELSE BEEN INJURED AS A RESULT OF YOUR DRINKING: NO
HOW OFTEN DURING THE LAST YEAR HAVE YOU FAILED TO DO WHAT WAS NORMALLY EXPECTED FROM YOU BECAUSE OF DRINKING: NEVER
HAS A RELATIVE, FRIEND, DOCTOR, OR ANOTHER HEALTH PROFESSIONAL EXPRESSED CONCERN ABOUT YOUR DRINKING OR SUGGESTED YOU CUT DOWN: NO
HAVE YOU OR SOMEONE ELSE BEEN INJURED AS A RESULT OF YOUR DRINKING: NO
HOW OFTEN DURING THE LAST YEAR HAVE YOU BEEN UNABLE TO REMEMBER WHAT HAPPENED THE NIGHT BEFORE BECAUSE YOU HAD BEEN DRINKING: NEVER
HAS A RELATIVE, FRIEND, DOCTOR, OR ANOTHER HEALTH PROFESSIONAL EXPRESSED CONCERN ABOUT YOUR DRINKING OR SUGGESTED YOU CUT DOWN: NO
HOW OFTEN DURING THE LAST YEAR HAVE YOU NEEDED AN ALCOHOLIC DRINK FIRST THING IN THE MORNING TO GET YOURSELF GOING AFTER A NIGHT OF HEAVY DRINKING: NEVER

## 2024-12-09 ASSESSMENT — PATIENT HEALTH QUESTIONNAIRE - PHQ9
SUM OF ALL RESPONSES TO PHQ QUESTIONS 1-9: 0
SUM OF ALL RESPONSES TO PHQ QUESTIONS 1-9: 0
2. FEELING DOWN, DEPRESSED OR HOPELESS: NOT AT ALL
1. LITTLE INTEREST OR PLEASURE IN DOING THINGS: NOT AT ALL
SUM OF ALL RESPONSES TO PHQ QUESTIONS 1-9: 0
SUM OF ALL RESPONSES TO PHQ QUESTIONS 1-9: 0
SUM OF ALL RESPONSES TO PHQ9 QUESTIONS 1 & 2: 0

## 2024-12-11 ENCOUNTER — TELEMEDICINE (OUTPATIENT)
Dept: FAMILY MEDICINE CLINIC | Age: 74
End: 2024-12-11

## 2024-12-11 DIAGNOSIS — Z00.00 MEDICARE ANNUAL WELLNESS VISIT, SUBSEQUENT: Primary | ICD-10-CM

## 2024-12-11 ASSESSMENT — LIFESTYLE VARIABLES
HAS A RELATIVE, FRIEND, DOCTOR, OR ANOTHER HEALTH PROFESSIONAL EXPRESSED CONCERN ABOUT YOUR DRINKING OR SUGGESTED YOU CUT DOWN: NO
HOW OFTEN DURING THE LAST YEAR HAVE YOU HAD A FEELING OF GUILT OR REMORSE AFTER DRINKING: NEVER
HOW OFTEN DURING THE LAST YEAR HAVE YOU BEEN UNABLE TO REMEMBER WHAT HAPPENED THE NIGHT BEFORE BECAUSE YOU HAD BEEN DRINKING: NEVER
HOW MANY STANDARD DRINKS CONTAINING ALCOHOL DO YOU HAVE ON A TYPICAL DAY: 1 OR 2
HAVE YOU OR SOMEONE ELSE BEEN INJURED AS A RESULT OF YOUR DRINKING: NO
HOW OFTEN DURING THE LAST YEAR HAVE YOU FOUND THAT YOU WERE NOT ABLE TO STOP DRINKING ONCE YOU HAD STARTED: NEVER
HOW OFTEN DO YOU HAVE A DRINK CONTAINING ALCOHOL: 4 OR MORE TIMES A WEEK
HOW OFTEN DURING THE LAST YEAR HAVE YOU NEEDED AN ALCOHOLIC DRINK FIRST THING IN THE MORNING TO GET YOURSELF GOING AFTER A NIGHT OF HEAVY DRINKING: NEVER
HOW OFTEN DURING THE LAST YEAR HAVE YOU FAILED TO DO WHAT WAS NORMALLY EXPECTED FROM YOU BECAUSE OF DRINKING: NEVER

## 2024-12-11 ASSESSMENT — PATIENT HEALTH QUESTIONNAIRE - PHQ9
2. FEELING DOWN, DEPRESSED OR HOPELESS: NOT AT ALL
SUM OF ALL RESPONSES TO PHQ QUESTIONS 1-9: 0
SUM OF ALL RESPONSES TO PHQ9 QUESTIONS 1 & 2: 0
SUM OF ALL RESPONSES TO PHQ QUESTIONS 1-9: 0
SUM OF ALL RESPONSES TO PHQ QUESTIONS 1-9: 0
1. LITTLE INTEREST OR PLEASURE IN DOING THINGS: NOT AT ALL
SUM OF ALL RESPONSES TO PHQ QUESTIONS 1-9: 0

## 2024-12-11 NOTE — PATIENT INSTRUCTIONS
Personalized Preventive Plan for Tobias Bailey Jr. - 12/11/2024  Medicare offers a range of preventive health benefits. Some of the tests and screenings are paid in full while other may be subject to a deductible, co-insurance, and/or copay.    Some of these benefits include a comprehensive review of your medical history including lifestyle, illnesses that may run in your family, and various assessments and screenings as appropriate.    After reviewing your medical record and screening and assessments performed today your provider may have ordered immunizations, labs, imaging, and/or referrals for you.  A list of these orders (if applicable) as well as your Preventive Care list are included within your After Visit Summary for your review.    Other Preventive Recommendations:    A preventive eye exam performed by an eye specialist is recommended every 1-2 years to screen for glaucoma; cataracts, macular degeneration, and other eye disorders.  A preventive dental visit is recommended every 6 months.  Try to get at least 150 minutes of exercise per week or 10,000 steps per day on a pedometer .  Order or download the FREE \"Exercise & Physical Activity: Your Everyday Guide\" from [employer]. Call 1-940.990.6992 or search [employer] online.  You need 5643-2657 mg of calcium and 0654-6342 IU of vitamin D per day. It is possible to meet your calcium requirement with diet alone, but a vitamin D supplement is usually necessary to meet this goal.  When exposed to the sun, use a sunscreen that protects against both UVA and UVB radiation with an SPF of 30 or greater. Reapply every 2 to 3 hours or after sweating, drying off with a towel, or swimming.  Always wear a seat belt when traveling in a car. Always wear a helmet when riding a bicycle or motorcycle.

## 2024-12-11 NOTE — PROGRESS NOTES
Medicare Annual Wellness Visit    Tobias Bailey Jr. is here for Medicare AWV    Assessment & Plan   Medicare annual wellness visit, subsequent  Recommendations for Preventive Services Due: see orders and patient instructions/AVS.  Recommended screening schedule for the next 5-10 years is provided to the patient in written form: see Patient Instructions/AVS.     No follow-ups on file.     Subjective       Patient's complete Health Risk Assessment and screening values have been reviewed and are found in Flowsheets. The following problems were reviewed today and where indicated follow up appointments were made and/or referrals ordered.    Positive Risk Factor Screenings with Interventions:       Alcohol Screening:  AUDIT-C Score: 5  AUDIT Total Score: 5  Total Score Interpretation: 0-7 suggests low risk alcohol consumption but assess individual risks   Interventions:  Patient declined any further intervention or treatment                      Advanced Directives:  Do you have a Living Will?: (!) No (needs to get it notarized)    Intervention:  Patient states he needs to get his Living Will notarized. Request he bring it to the office for us to scan when he's ready.        Tobacco Use:    Tobacco Use      Smoking status: Light Smoker        Types: Cigars        Start date: 1974      Smokeless tobacco: Never      Tobacco comments: Patient states he smokes a couple of cigars every now and then 12/11/24     Interventions:  Patient comments: states he smokes a couple of cigars every now and then.  Patient declined any further intervention or treatment                      Objective    Patient-Reported Vitals  No data recorded     Unable to obtain 3 vital signs due to patient not having equipment to take blood pressure/temperature.                Allergies   Allergen Reactions    Seasonal      Prior to Visit Medications    Medication Sig Taking? Authorizing Provider   meloxicam (MOBIC) 15 MG tablet Take one tablet po once

## 2024-12-26 ENCOUNTER — TELEPHONE (OUTPATIENT)
Dept: CARDIOLOGY CLINIC | Age: 74
End: 2024-12-26

## 2024-12-26 NOTE — TELEPHONE ENCOUNTER
Notified       Vascular US Abdominal AORTA Duplex Scan    No evidence of aneurysm of the abdominal aorta or bilateral common iliac arteries.    Significant atherosclerosis of the abdominal aortaand bilateral BEN.  Right BEN exhibits elevated velocities.      Echo (TTE) complete       Left Ventricle: Normal left ventricular systolic function with a visually estimated EF of 55 - 60%. Left ventricle size is normal. Mildly increased wall thickness. Findings consistent with concentric hypertrophy. Normal wall motion. Diastolic dysfunction present with normal LV EF.    Mitral Valve: Mild regurgitation.    Tricuspid Valve: Normal RVSP. The estimated RVSP is 26 mmHg.

## 2024-12-27 ENCOUNTER — TELEPHONE (OUTPATIENT)
Dept: FAMILY MEDICINE CLINIC | Age: 74
End: 2024-12-27

## 2024-12-27 DIAGNOSIS — U07.1 COVID: Primary | ICD-10-CM

## 2024-12-27 NOTE — TELEPHONE ENCOUNTER
Okay to send in standard strength Paxlovid 5-day prescription.  He will need to hold atorvastatin while on Paxlovid

## 2024-12-27 NOTE — TELEPHONE ENCOUNTER
To Dr. Arias-    Patient tested Positive for COVID today----symptoms started  Today.     Symptoms:    Achy Body    Runny Nose    Cough    No Fever    Fatigue        [employer]

## 2025-01-02 ENCOUNTER — OFFICE VISIT (OUTPATIENT)
Dept: CARDIOLOGY CLINIC | Age: 75
End: 2025-01-02
Payer: COMMERCIAL

## 2025-01-02 VITALS
SYSTOLIC BLOOD PRESSURE: 142 MMHG | WEIGHT: 176 LBS | HEART RATE: 60 BPM | DIASTOLIC BLOOD PRESSURE: 64 MMHG | BODY MASS INDEX: 26.76 KG/M2

## 2025-01-02 DIAGNOSIS — R94.39 ABNORMAL STRESS TEST: ICD-10-CM

## 2025-01-02 DIAGNOSIS — I73.9 PAD (PERIPHERAL ARTERY DISEASE) (HCC): Primary | ICD-10-CM

## 2025-01-02 DIAGNOSIS — I10 ESSENTIAL HYPERTENSION: ICD-10-CM

## 2025-01-02 DIAGNOSIS — E78.5 DYSLIPIDEMIA: ICD-10-CM

## 2025-01-02 DIAGNOSIS — Z72.0 NICOTINE ABUSE: ICD-10-CM

## 2025-01-02 PROCEDURE — 3077F SYST BP >= 140 MM HG: CPT | Performed by: INTERNAL MEDICINE

## 2025-01-02 PROCEDURE — 3078F DIAST BP <80 MM HG: CPT | Performed by: INTERNAL MEDICINE

## 2025-01-02 PROCEDURE — 1159F MED LIST DOCD IN RCRD: CPT | Performed by: INTERNAL MEDICINE

## 2025-01-02 PROCEDURE — 99214 OFFICE O/P EST MOD 30 MIN: CPT | Performed by: INTERNAL MEDICINE

## 2025-01-02 PROCEDURE — 1123F ACP DISCUSS/DSCN MKR DOCD: CPT | Performed by: INTERNAL MEDICINE

## 2025-01-02 RX ORDER — ASPIRIN 81 MG/1
81 TABLET ORAL DAILY
Qty: 90 TABLET | Refills: 0 | Status: SHIPPED | OUTPATIENT
Start: 2025-01-02

## 2025-01-02 RX ORDER — ROSUVASTATIN CALCIUM 20 MG/1
20 TABLET, COATED ORAL DAILY
Qty: 90 TABLET | Refills: 1 | Status: SHIPPED | OUTPATIENT
Start: 2025-01-02

## 2025-01-02 RX ORDER — ATENOLOL 25 MG/1
12.5 TABLET ORAL DAILY
Qty: 30 TABLET | Refills: 3 | Status: SHIPPED | OUTPATIENT
Start: 2025-01-02 | End: 2025-08-30

## 2025-01-02 NOTE — PROGRESS NOTES
Jesus Garcia MD                                  CARDIOLOGY  NOTE       Chief Complaint:    Chief Complaint   Patient presents with    Results     Pt states SOB that he had Covid last week, no refills needed     Shortness of Breath        Follow-up PAD, CAD        Prior HPI:     Tobias is a 74 y.o. year old male who is presenting for evaluation of abnormal x-ray.  Patient underwent lumbar spine x-ray for mid back pain which showed prominent arterial calcification.    Patient has prior medical history significant for essential hypertension hyperlipidemia.     Patient plays golf 2-3 times per week.  Denies any active chest pains, recently had a flu/common cold and feels congested.  Denies any palpitation, has mild dyspnea occasionally.    Patient has osteoarthritis of the back and recently underwent x-ray  Family history is positive for congestive heart failure in father who passed away at the age of 90  Patient is a former smoker who quit in 1990s, currently smokes cigars occasionally.      AP and lateral thoracolumbar spine:     COMPARISON: Lumbar spine study of 5/12/2021..     HISTORY: Mid back pain. No specific injury.     IMPRESSION:  Prominent arterial calcification is seen.     A mild degree of gentle thoracolumbar levoscoliosis is centered about the L1  additional 2 level.     Mild to moderate degenerative changes are seen throughout the lumbar spine, with  moderate disc narrowing noted at L3-L4. Prominent Schmorl's nodes are seen at  the superior L2 and L3 vertebral bodies.     Mid to lower lumbar posterior facet hypertrophy is noted.  No evidence of spondylolysis or spondylolisthesis.     Minimal sacroiliac joint degenerative changes are seen.      Current Outpatient Medications   Medication Sig Dispense Refill    aspirin 81 MG EC tablet Take 1 tablet by mouth daily 90 tablet 0    rosuvastatin (CRESTOR) 20 MG tablet Take 1 tablet by mouth daily 90 tablet 1    atenolol (TENORMIN) 25 MG tablet Take 0.5

## 2025-01-24 RX ORDER — ATENOLOL 25 MG/1
12.5 TABLET ORAL DAILY
Qty: 30 TABLET | Refills: 3 | OUTPATIENT
Start: 2025-01-24 | End: 2025-09-21

## 2025-03-09 ENCOUNTER — APPOINTMENT (OUTPATIENT)
Dept: GENERAL RADIOLOGY | Age: 75
End: 2025-03-09
Payer: COMMERCIAL

## 2025-03-09 ENCOUNTER — HOSPITAL ENCOUNTER (EMERGENCY)
Age: 75
Discharge: HOME | End: 2025-03-09
Attending: STUDENT IN AN ORGANIZED HEALTH CARE EDUCATION/TRAINING PROGRAM
Payer: COMMERCIAL

## 2025-03-09 VITALS
RESPIRATION RATE: 16 BRPM | HEIGHT: 68 IN | SYSTOLIC BLOOD PRESSURE: 176 MMHG | BODY MASS INDEX: 26.22 KG/M2 | HEART RATE: 52 BPM | TEMPERATURE: 98.7 F | OXYGEN SATURATION: 94 % | WEIGHT: 173 LBS | DIASTOLIC BLOOD PRESSURE: 63 MMHG

## 2025-03-09 DIAGNOSIS — M25.511 ACUTE PAIN OF RIGHT SHOULDER: ICD-10-CM

## 2025-03-09 DIAGNOSIS — M25.511 ACUTE PAIN OF RIGHT SHOULDER: Primary | ICD-10-CM

## 2025-03-09 DIAGNOSIS — M19.90 ARTHRITIS: ICD-10-CM

## 2025-03-09 PROCEDURE — 6370000000 HC RX 637 (ALT 250 FOR IP): Performed by: STUDENT IN AN ORGANIZED HEALTH CARE EDUCATION/TRAINING PROGRAM

## 2025-03-09 PROCEDURE — 99283 EMERGENCY DEPT VISIT LOW MDM: CPT

## 2025-03-09 PROCEDURE — 73030 X-RAY EXAM OF SHOULDER: CPT

## 2025-03-09 RX ORDER — HYDROCODONE BITARTRATE AND ACETAMINOPHEN 5; 325 MG/1; MG/1
1 TABLET ORAL ONCE
Status: COMPLETED | OUTPATIENT
Start: 2025-03-09 | End: 2025-03-09

## 2025-03-09 RX ORDER — METHYLPREDNISOLONE 4 MG/1
TABLET ORAL
Qty: 1 KIT | Refills: 0 | Status: SHIPPED | OUTPATIENT
Start: 2025-03-09

## 2025-03-09 RX ADMIN — HYDROCODONE BITARTRATE AND ACETAMINOPHEN 1 TABLET: 5; 325 TABLET ORAL at 07:26

## 2025-03-09 ASSESSMENT — LIFESTYLE VARIABLES
HOW OFTEN DO YOU HAVE A DRINK CONTAINING ALCOHOL: 4 OR MORE TIMES A WEEK
HOW MANY STANDARD DRINKS CONTAINING ALCOHOL DO YOU HAVE ON A TYPICAL DAY: 1 OR 2

## 2025-03-09 ASSESSMENT — PAIN DESCRIPTION - LOCATION
LOCATION: SHOULDER

## 2025-03-09 ASSESSMENT — PAIN DESCRIPTION - DESCRIPTORS
DESCRIPTORS: SHARP
DESCRIPTORS: SHARP

## 2025-03-09 ASSESSMENT — PAIN DESCRIPTION - ORIENTATION
ORIENTATION: RIGHT

## 2025-03-09 ASSESSMENT — PAIN SCALES - GENERAL
PAINLEVEL_OUTOF10: 10
PAINLEVEL_OUTOF10: 10
PAINLEVEL_OUTOF10: 6

## 2025-03-09 ASSESSMENT — PAIN - FUNCTIONAL ASSESSMENT
PAIN_FUNCTIONAL_ASSESSMENT: 0-10
PAIN_FUNCTIONAL_ASSESSMENT: 0-10

## 2025-03-09 NOTE — DISCHARGE INSTRUCTIONS
You can use acetaminophen as needed for pain  You can try the steroids to help with your pain  You can call and follow up with Dr Moreno and/or Dr Rosen or orthopedic surgery in the next 1-3 days regarding your symptoms  Call and follow-up with your family doctor in the next 1-3 days  Return to the ED if your symptoms worsen or you feel you need to be reevaluated   Clear

## 2025-03-09 NOTE — ED TRIAGE NOTES
Patient presents to the ED via [walk in from home.  Chief complaint: right shoulder pain.   Initial vital signs:   Vitals:    03/09/25 0642   BP: (!) 190/61   Pulse: 56   Resp: 18   Temp: 98.7 °F (37.1 °C)   SpO2: 98%      Patient reports right shoulder pain that started yesterday morning. Patient states this happens about once a year and receives steroids via a shot that helped the pain.   Patient rates pain as 10 on a scale of 0-10, described as sharp. Pain is located at right shoulder / shoulder blade.  Patient did take a meloxicam roughly 45 minutes ago, has not reduced pain.

## 2025-03-09 NOTE — ED PROVIDER NOTES
included in the SEP-1 Core Measure due to severe sepsis or septic shock?   No   Exclusion criteria - the patient is NOT to be included for SEP-1 Core Measure due to:  Infection is not suspected      ED Medications administered this visit:    Medications   HYDROcodone-acetaminophen (NORCO) 5-325 MG per tablet 1 tablet (1 tablet Oral Given 3/9/25 0726)       DISCHARGE PRESCRIPTIONS: (None if blank)  New Prescriptions    METHYLPREDNISOLONE (MEDROL, SRAVAN,) 4 MG TABLET    As Directed       I have reviewed and interpreted all of the currently available lab results from this visit (if applicable):    Radiographs (if obtained):  Radiologist's Report Reviewed:  XR SHOULDER RIGHT (MIN 2 VIEWS)   Final Result        XR THORACOLUMBAR SPINE (MIN 2 VIEWS)  Result Date: 11/14/2024  AP and lateral thoracolumbar spine: COMPARISON: Lumbar spine study of 5/12/2021.. HISTORY: Mid back pain. No specific injury.     Prominent arterial calcification is seen. A mild degree of gentle thoracolumbar levoscoliosis is centered about the L1 additional 2 level. Mild to moderate degenerative changes are seen throughout the lumbar spine, with moderate disc narrowing noted at L3-L4. Prominent Schmorl's nodes are seen at the superior L2 and L3 vertebral bodies. Mid to lower lumbar posterior facet hypertrophy is noted. No evidence of spondylolysis or spondylolisthesis. Minimal sacroiliac joint degenerative changes are seen. Electronically signed by Luis Rosa MD      LABS: (none if blank)  Labs Reviewed - No data to display    FINAL IMPRESSION      Final diagnoses:   Acute pain of right shoulder   Arthritis     Condition: stable  Dispo: Discharge      This transcription was electronically signed. Parts of this transcriptions may have been dictated by use of voice recognition software and electronically transcribed, and parts may have been transcribed with the assistance of an ED scribe and may contain errors related to that system including errors

## 2025-03-10 RX ORDER — MELOXICAM 15 MG/1
TABLET ORAL
Qty: 90 TABLET | Refills: 0 | Status: SHIPPED | OUTPATIENT
Start: 2025-03-10

## 2025-03-11 ENCOUNTER — OFFICE VISIT (OUTPATIENT)
Dept: FAMILY MEDICINE CLINIC | Age: 75
End: 2025-03-11

## 2025-03-11 ENCOUNTER — HOSPITAL ENCOUNTER (OUTPATIENT)
Dept: GENERAL RADIOLOGY | Age: 75
Discharge: HOME | End: 2025-03-11
Payer: COMMERCIAL

## 2025-03-11 ENCOUNTER — HOSPITAL ENCOUNTER (OUTPATIENT)
Age: 75
Discharge: HOME | End: 2025-03-11
Payer: COMMERCIAL

## 2025-03-11 ENCOUNTER — CARE COORDINATION (OUTPATIENT)
Dept: CARE COORDINATION | Age: 75
End: 2025-03-11

## 2025-03-11 VITALS
WEIGHT: 175.9 LBS | DIASTOLIC BLOOD PRESSURE: 70 MMHG | OXYGEN SATURATION: 97 % | BODY MASS INDEX: 26.66 KG/M2 | RESPIRATION RATE: 18 BRPM | HEIGHT: 68 IN | SYSTOLIC BLOOD PRESSURE: 140 MMHG | HEART RATE: 52 BPM

## 2025-03-11 DIAGNOSIS — M79.601 RIGHT ARM PAIN: ICD-10-CM

## 2025-03-11 DIAGNOSIS — M79.601 RIGHT ARM PAIN: Primary | ICD-10-CM

## 2025-03-11 PROCEDURE — 72050 X-RAY EXAM NECK SPINE 4/5VWS: CPT

## 2025-03-11 RX ORDER — METHYLPREDNISOLONE ACETATE 40 MG/ML
40 INJECTION, SUSPENSION INTRA-ARTICULAR; INTRALESIONAL; INTRAMUSCULAR; SOFT TISSUE ONCE
Status: COMPLETED | OUTPATIENT
Start: 2025-03-11 | End: 2025-03-11

## 2025-03-11 RX ORDER — GABAPENTIN 100 MG/1
100 CAPSULE ORAL 2 TIMES DAILY
Qty: 60 CAPSULE | Refills: 0 | Status: SHIPPED | OUTPATIENT
Start: 2025-03-11 | End: 2025-04-10

## 2025-03-11 RX ADMIN — METHYLPREDNISOLONE ACETATE 40 MG: 40 INJECTION, SUSPENSION INTRA-ARTICULAR; INTRALESIONAL; INTRAMUSCULAR; SOFT TISSUE at 08:22

## 2025-03-11 SDOH — ECONOMIC STABILITY: FOOD INSECURITY: WITHIN THE PAST 12 MONTHS, YOU WORRIED THAT YOUR FOOD WOULD RUN OUT BEFORE YOU GOT MONEY TO BUY MORE.: NEVER TRUE

## 2025-03-11 SDOH — ECONOMIC STABILITY: FOOD INSECURITY: WITHIN THE PAST 12 MONTHS, THE FOOD YOU BOUGHT JUST DIDN'T LAST AND YOU DIDN'T HAVE MONEY TO GET MORE.: NEVER TRUE

## 2025-03-11 ASSESSMENT — PATIENT HEALTH QUESTIONNAIRE - PHQ9
SUM OF ALL RESPONSES TO PHQ QUESTIONS 1-9: 0
SUM OF ALL RESPONSES TO PHQ QUESTIONS 1-9: 0
2. FEELING DOWN, DEPRESSED OR HOPELESS: NOT AT ALL
SUM OF ALL RESPONSES TO PHQ QUESTIONS 1-9: 0
SUM OF ALL RESPONSES TO PHQ QUESTIONS 1-9: 0
1. LITTLE INTEREST OR PLEASURE IN DOING THINGS: NOT AT ALL

## 2025-03-11 ASSESSMENT — ENCOUNTER SYMPTOMS
TROUBLE SWALLOWING: 0
CHEST TIGHTNESS: 0
NAUSEA: 0
EYE PAIN: 0
BLOOD IN STOOL: 0
DIARRHEA: 0
VOMITING: 0
WHEEZING: 0
SHORTNESS OF BREATH: 0
ABDOMINAL PAIN: 0

## 2025-03-11 NOTE — PROGRESS NOTES
3/11/2025    Tobias Bailey .    Chief Complaint   Patient presents with    ED Follow-up     Acute pain of right shoulder 3/9/25 Patient states still having shoulder pain. Some better. States was given 1 norco pill and that did help.        HPI  History was obtained from the patient.  Tobias is a 74 y.o. male who presents today with increasing pain diffuse around right shoulder for about 4 days see ER visit 3/9/2025.  X-ray right shoulder was fairly benign with just some mild OA changes noted in cervical spine..  He has had something like this happen a couple times in the past.  No history of injury does get a little relief with some of this pain with elevation of arm above his head.  Fair range of motion of the shoulder noted just uncomfortable in general it is slightly tender at times.  No current neck pain.    REVIEW OF SYMPTOMS    Review of Systems   Constitutional:  Negative for activity change and fatigue.   HENT:  Negative for congestion, hearing loss, mouth sores and trouble swallowing.    Eyes:  Negative for pain and visual disturbance.   Respiratory:  Negative for chest tightness, shortness of breath and wheezing.    Cardiovascular:  Negative for chest pain and palpitations.   Gastrointestinal:  Negative for abdominal pain, blood in stool, diarrhea, nausea and vomiting.   Genitourinary:  Negative for dysuria, frequency and urgency.   Musculoskeletal:  Negative for arthralgias, gait problem and neck stiffness.   Skin:  Negative for rash.   Allergic/Immunologic: Negative for environmental allergies.   Neurological:  Negative for dizziness, seizures, speech difficulty and weakness.        Right shoulder girdle pain extending down into proximal right upper arm   Hematological:  Does not bruise/bleed easily.   Psychiatric/Behavioral:  Negative for agitation, confusion, hallucinations and sleep disturbance. The patient is not nervous/anxious.        PAST MEDICAL HISTORY  Past Medical History:   Diagnosis Date

## 2025-03-11 NOTE — CARE COORDINATION
Ambulatory Care Coordination Note     3/11/2025 3:49 PM     Patient Current Location:  Home: [address] [place] [address]     This patient was received as a referral from [facility] health report .    ACM contacted the patient by telephone. Verified name and  with patient as identifiers. Provided introduction to self, and explanation of the ACM role.   Patient declined care management services at this time.          ACM: Briana Munoz, RN     Care Summary Note: ACM outreach to patient for Care Management.     PCP/Specialist follow up:   Future Appointments         Provider Specialty Dept Phone    2025 8:00 AM Jesus Garcia MD Cardiology [phone]    2025 8:00 AM (Arrive by 7:45 AM) Clayton Mon MD Family Medicine [phone]    2025 3:30 PM [facility] AWV LPN Family Medicine [phone]

## 2025-03-12 ENCOUNTER — RESULTS FOLLOW-UP (OUTPATIENT)
Dept: GENERAL RADIOLOGY | Age: 75
End: 2025-03-12

## 2025-03-20 RX ORDER — LISINOPRIL 20 MG/1
20 TABLET ORAL DAILY
Qty: 90 TABLET | Refills: 1 | Status: SHIPPED | OUTPATIENT
Start: 2025-03-20

## 2025-04-02 ENCOUNTER — OFFICE VISIT (OUTPATIENT)
Dept: CARDIOLOGY CLINIC | Age: 75
End: 2025-04-02
Payer: COMMERCIAL

## 2025-04-02 VITALS
SYSTOLIC BLOOD PRESSURE: 134 MMHG | BODY MASS INDEX: 26.52 KG/M2 | DIASTOLIC BLOOD PRESSURE: 60 MMHG | HEIGHT: 68 IN | HEART RATE: 56 BPM | WEIGHT: 175 LBS

## 2025-04-02 DIAGNOSIS — E78.5 DYSLIPIDEMIA: ICD-10-CM

## 2025-04-02 DIAGNOSIS — I10 ESSENTIAL HYPERTENSION: ICD-10-CM

## 2025-04-02 DIAGNOSIS — R00.1 BRADYCARDIA: ICD-10-CM

## 2025-04-02 DIAGNOSIS — R94.39 ABNORMAL STRESS TEST: Primary | ICD-10-CM

## 2025-04-02 DIAGNOSIS — R06.02 SHORTNESS OF BREATH: ICD-10-CM

## 2025-04-02 PROCEDURE — 99214 OFFICE O/P EST MOD 30 MIN: CPT | Performed by: INTERNAL MEDICINE

## 2025-04-02 PROCEDURE — 3075F SYST BP GE 130 - 139MM HG: CPT | Performed by: INTERNAL MEDICINE

## 2025-04-02 PROCEDURE — 3078F DIAST BP <80 MM HG: CPT | Performed by: INTERNAL MEDICINE

## 2025-04-02 PROCEDURE — 1123F ACP DISCUSS/DSCN MKR DOCD: CPT | Performed by: INTERNAL MEDICINE

## 2025-04-02 RX ORDER — ATENOLOL 25 MG/1
12.5 TABLET ORAL DAILY
Qty: 30 TABLET | Refills: 3 | Status: SHIPPED | OUTPATIENT
Start: 2025-04-02 | End: 2025-11-28

## 2025-04-02 NOTE — PROGRESS NOTES
CLINICAL STAFF DOCUMENTATION    Dr. Jesus Bailey Jr.  1950  [id number]    Have you had any Chest Pain recently? - No    Have you had any Shortness of Breath - No  Have you had any dizziness - No    Have you had any palpitations recently? - No  Any thyroid issues? - No    Do you have any edema - swelling in No    Is the patient on any of the following medications -   If Yes DO EKG - Needs done every 3 months    When did you have your last labs drawn lipid 5/24  What doctor ordered   Do we have the labs in their chart Yes  If we do not have the labs, ask where they were drawn     If we do not have these labs, you are retrieve these labs for the provider!    Do you need any prescriptions refilled? - Yes    Do you have a surgery or procedure scheduled in the near future - No  Do use tobacco products? -  cigar  Do you drink alcohol? - Yes  Do you use any illicit drugs? - No  Caffeine? - Yes  How much caffeine? .1  cups

## 2025-04-02 NOTE — PROGRESS NOTES
Jesus Garcia MD                                  CARDIOLOGY  NOTE       Chief Complaint:    Chief Complaint   Patient presents with    3 Month Follow-Up     No complaints        Follow-up PAD, CAD        Prior HPI:     Tobias is a 74 y.o. year old male who is presenting for evaluation of abnormal x-ray.  Patient underwent lumbar spine x-ray for mid back pain which showed prominent arterial calcification.    Patient has prior medical history significant for essential hypertension hyperlipidemia.     Patient plays golf 2-3 times per week.  Denies any active chest pains, recently had a flu/common cold and feels congested.  Denies any palpitation, has mild dyspnea occasionally.    Patient has osteoarthritis of the back and recently underwent x-ray  Family history is positive for congestive heart failure in father who passed away at the age of 90  Patient is a former smoker who quit in 1990s, currently smokes cigars occasionally.      AP and lateral thoracolumbar spine:     COMPARISON: Lumbar spine study of 5/12/2021..     HISTORY: Mid back pain. No specific injury.     IMPRESSION:  Prominent arterial calcification is seen.     A mild degree of gentle thoracolumbar levoscoliosis is centered about the L1  additional 2 level.     Mild to moderate degenerative changes are seen throughout the lumbar spine, with  moderate disc narrowing noted at L3-L4. Prominent Schmorl's nodes are seen at  the superior L2 and L3 vertebral bodies.     Mid to lower lumbar posterior facet hypertrophy is noted.  No evidence of spondylolysis or spondylolisthesis.     Minimal sacroiliac joint degenerative changes are seen.      Current Outpatient Medications   Medication Sig Dispense Refill    lisinopril (PRINIVIL;ZESTRIL) 20 MG tablet Take 1 tablet by mouth daily 90 tablet 1    meloxicam (MOBIC) 15 MG tablet TAKE 1 TABLET BY MOUTH EVERY DAY WITH FOOD AVOID ALL OTHER NSAIDS 90 tablet 0    aspirin 81 MG EC tablet Take 1 tablet by mouth daily

## 2025-05-06 ENCOUNTER — OFFICE VISIT (OUTPATIENT)
Dept: FAMILY MEDICINE CLINIC | Age: 75
End: 2025-05-06
Payer: COMMERCIAL

## 2025-05-06 VITALS
OXYGEN SATURATION: 97 % | WEIGHT: 176 LBS | HEART RATE: 78 BPM | SYSTOLIC BLOOD PRESSURE: 130 MMHG | HEIGHT: 68 IN | DIASTOLIC BLOOD PRESSURE: 76 MMHG | BODY MASS INDEX: 26.67 KG/M2

## 2025-05-06 DIAGNOSIS — E78.2 MIXED HYPERLIPIDEMIA: ICD-10-CM

## 2025-05-06 DIAGNOSIS — I10 ESSENTIAL HYPERTENSION: Primary | ICD-10-CM

## 2025-05-06 DIAGNOSIS — K57.90 DIVERTICULAR DISEASE: ICD-10-CM

## 2025-05-06 DIAGNOSIS — R53.83 OTHER FATIGUE: ICD-10-CM

## 2025-05-06 DIAGNOSIS — J30.9 ALLERGIC RHINITIS, UNSPECIFIED SEASONALITY, UNSPECIFIED TRIGGER: ICD-10-CM

## 2025-05-06 DIAGNOSIS — M15.0 PRIMARY OSTEOARTHRITIS INVOLVING MULTIPLE JOINTS: ICD-10-CM

## 2025-05-06 DIAGNOSIS — I10 ESSENTIAL HYPERTENSION: ICD-10-CM

## 2025-05-06 LAB
ALBUMIN SERPL-MCNC: 4.3 G/DL (ref 3.4–5)
ALBUMIN/GLOB SERPL: 1.7 {RATIO} (ref 1.1–2.2)
ALP SERPL-CCNC: 73 U/L (ref 40–129)
ALT SERPL-CCNC: 24 U/L (ref 10–40)
ANION GAP SERPL CALCULATED.3IONS-SCNC: 10 MMOL/L (ref 3–16)
AST SERPL-CCNC: 24 U/L (ref 15–37)
BILIRUB SERPL-MCNC: 0.4 MG/DL (ref 0–1)
BUN SERPL-MCNC: 14 MG/DL (ref 7–20)
CALCIUM SERPL-MCNC: 9.6 MG/DL (ref 8.3–10.6)
CHLORIDE SERPL-SCNC: 100 MMOL/L (ref 99–110)
CHOLEST SERPL-MCNC: 131 MG/DL (ref 0–199)
CO2 SERPL-SCNC: 28 MMOL/L (ref 21–32)
CREAT SERPL-MCNC: 0.9 MG/DL (ref 0.8–1.3)
DEPRECATED RDW RBC AUTO: 14 % (ref 12.4–15.4)
GFR SERPLBLD CREATININE-BSD FMLA CKD-EPI: 89 ML/MIN/{1.73_M2}
GLUCOSE SERPL-MCNC: 77 MG/DL (ref 70–99)
HCT VFR BLD AUTO: 46.8 % (ref 40.5–52.5)
HDLC SERPL-MCNC: 63 MG/DL (ref 40–60)
HGB BLD-MCNC: 15.7 G/DL (ref 13.5–17.5)
LDLC SERPL CALC-MCNC: 56 MG/DL
MCH RBC QN AUTO: 30.5 PG (ref 26–34)
MCHC RBC AUTO-ENTMCNC: 33.6 G/DL (ref 31–36)
MCV RBC AUTO: 90.8 FL (ref 80–100)
PLATELET # BLD AUTO: 224 K/UL (ref 135–450)
PMV BLD AUTO: 9.5 FL (ref 5–10.5)
POTASSIUM SERPL-SCNC: 5.2 MMOL/L (ref 3.5–5.1)
PROT SERPL-MCNC: 6.9 G/DL (ref 6.4–8.2)
RBC # BLD AUTO: 5.15 M/UL (ref 4.2–5.9)
SODIUM SERPL-SCNC: 138 MMOL/L (ref 136–145)
TRIGL SERPL-MCNC: 59 MG/DL (ref 0–150)
VLDLC SERPL CALC-MCNC: 12 MG/DL
WBC # BLD AUTO: 8.6 K/UL (ref 4–11)

## 2025-05-06 PROCEDURE — 1159F MED LIST DOCD IN RCRD: CPT | Performed by: FAMILY MEDICINE

## 2025-05-06 PROCEDURE — 3075F SYST BP GE 130 - 139MM HG: CPT | Performed by: FAMILY MEDICINE

## 2025-05-06 PROCEDURE — 1123F ACP DISCUSS/DSCN MKR DOCD: CPT | Performed by: FAMILY MEDICINE

## 2025-05-06 PROCEDURE — 3078F DIAST BP <80 MM HG: CPT | Performed by: FAMILY MEDICINE

## 2025-05-06 PROCEDURE — 99214 OFFICE O/P EST MOD 30 MIN: CPT | Performed by: FAMILY MEDICINE

## 2025-05-06 ASSESSMENT — ENCOUNTER SYMPTOMS
CHEST TIGHTNESS: 0
VOMITING: 0
EYE PAIN: 0
DIARRHEA: 0
BLOOD IN STOOL: 0
WHEEZING: 0
SHORTNESS OF BREATH: 0
TROUBLE SWALLOWING: 0
ABDOMINAL PAIN: 0
NAUSEA: 0

## 2025-05-06 NOTE — PROGRESS NOTES
5/6/2025    Tobias Bailey .    Chief Complaint   Patient presents with    6 Month Follow-Up     - no complaints       HPI  History was obtained from the patient.  Tobias is a 74 y.o. male who presents today with routine recheck.  He has history of hyperlipidemia, hypertension, allergies, and arthralgia.  He is actually feeling quite well low back pain has been an issue is feeling a bit better he is little discouraged because he no longer has a distance he used to have when he golfs.  Patient's last colonoscopy was 2019 and is due for a recheck.  I believe with Dr. Gonzalez.  He is also due for lab work.  Med list was updated    REVIEW OF SYMPTOMS    Review of Systems   Constitutional:  Negative for activity change and fatigue.   HENT:  Negative for congestion, hearing loss, mouth sores and trouble swallowing.    Eyes:  Negative for pain and visual disturbance.   Respiratory:  Negative for chest tightness, shortness of breath and wheezing.    Cardiovascular:  Negative for chest pain and palpitations.   Gastrointestinal:  Negative for abdominal pain, blood in stool, diarrhea, nausea and vomiting.   Genitourinary:  Negative for dysuria, frequency and urgency.   Musculoskeletal:  Positive for arthralgias. Negative for gait problem and neck stiffness.   Skin:  Negative for rash.   Allergic/Immunologic: Positive for environmental allergies.   Neurological:  Negative for dizziness, seizures, speech difficulty and weakness.   Hematological:  Does not bruise/bleed easily.   Psychiatric/Behavioral:  Negative for agitation, confusion and hallucinations.        PAST MEDICAL HISTORY  Past Medical History:   Diagnosis Date    Hyperlipidemia     Hypertension        FAMILY HISTORY  Family History   Problem Relation Age of Onset    Dementia Mother     Heart Failure Father     Cancer Sister         hysterectomy    Atrial Fibrillation Sister     No Known Problems Sister     No Known Problems Sister        SOCIAL HISTORY  Social

## 2025-06-06 DIAGNOSIS — M25.511 ACUTE PAIN OF RIGHT SHOULDER: ICD-10-CM

## 2025-06-06 RX ORDER — MELOXICAM 15 MG/1
TABLET ORAL
Qty: 90 TABLET | Refills: 0 | Status: SHIPPED | OUTPATIENT
Start: 2025-06-06

## 2025-06-24 RX ORDER — ROSUVASTATIN CALCIUM 20 MG/1
20 TABLET, COATED ORAL DAILY
Qty: 90 TABLET | Refills: 0 | Status: SHIPPED | OUTPATIENT
Start: 2025-06-24

## 2025-09-04 DIAGNOSIS — M25.511 ACUTE PAIN OF RIGHT SHOULDER: ICD-10-CM

## 2025-09-05 RX ORDER — MELOXICAM 15 MG/1
TABLET ORAL
Qty: 90 TABLET | Refills: 0 | Status: SHIPPED | OUTPATIENT
Start: 2025-09-05

## (undated) DEVICE — [AGGRESSIVE 6-FLUTE BARREL BUR, ARTHROSCOPIC SHAVER BLADE,  DO NOT RESTERILIZE,  DO NOT USE IF PACKAGE IS DAMAGED,  KEEP DRY,  KEEP AWAY FROM SUNLIGHT]: Brand: FORMULA

## (undated) DEVICE — SUTURE MAXBRAID SZ 2 NONABSORBABLE BLU C-7 TAPR NDL 900335

## (undated) DEVICE — TOWEL,OR,DSP,ST,BLUE,STD,6/PK,12PK/CS: Brand: MEDLINE

## (undated) DEVICE — SPONGE GZ W4XL8IN COT WVN 12 PLY

## (undated) DEVICE — PAD,ABDOMINAL,5"X9",ST,LF,25/BX: Brand: MEDLINE INDUSTRIES, INC.

## (undated) DEVICE — WEREWOLF FLOW 90 COBLATION WAND: Brand: COBLATION

## (undated) DEVICE — BANDAGE COMPR W4INXL5YD WHT BGE POLY COT M E WRP WV HK AND

## (undated) DEVICE — SUTURE PROL SZ 3-0 L18IN NONABSORBABLE BLU L30MM FS-1 3/8 8663G

## (undated) DEVICE — BANDAGE,SELF ADHRNT,COFLEX,4"X5YD,STRL: Brand: COLABEL

## (undated) DEVICE — GLOVE SURG SZ 65 L12IN FNGR THK79MIL GRN LTX FREE

## (undated) DEVICE — PENCIL ES CRD L10FT HND SWCHING ROCK SWCH W/ EDGE COAT BLDE

## (undated) DEVICE — SUTURE FIBERWIRE SZ 2 W/ TAPERED NEEDLE BLUE L38IN NONABSORB BLU L26.5MM 1/2 CIRCLE AR7200

## (undated) DEVICE — MARKER SURG SKIN UTIL REGULAR/FINE 2 TIP W/ RUL AND 9 LBL

## (undated) DEVICE — SPONGE LAP W18XL18IN WHT COT 4 PLY FLD STRUNG RADPQ DISP ST

## (undated) DEVICE — NEEDLE SPNL L3.5IN PNK HUB S STL REG WALL FIT STYL W/ QNCKE

## (undated) DEVICE — STERILE LATEX POWDER-FREE SURGICAL GLOVESWITH NITRILE COATING: Brand: PROTEXIS

## (undated) DEVICE — TUBING, SUCTION, 9/32" X 10', STRAIGHT: Brand: MEDLINE

## (undated) DEVICE — Device

## (undated) DEVICE — COUNTER NDL 30 COUNT FOAM STRP SGL MAG

## (undated) DEVICE — INTENT TO BE USED WITH SUTURE MATERIAL FOR TISSUE CLOSURE: Brand: RICHARD-ALLAN® NEEDLE 1/2 CIRCLE TAPER

## (undated) DEVICE — COTTON UNDERCAST PADDING,CRIMPED FINISH: Brand: WEBRIL

## (undated) DEVICE — SYRINGE IRRIG 60ML SFT PLIABLE BLB EZ TO GRP 1 HND USE W/

## (undated) DEVICE — CONTAINER,SPECIMEN,OR STERILE,4OZ: Brand: MEDLINE

## (undated) DEVICE — GLOVE SURG SZ 8 L12IN THK75MIL DK GRN LTX FREE

## (undated) DEVICE — SYRINGE 10ML HYPO W/O NDL W/ LUER SLP TP

## (undated) DEVICE — TUBING PMP L16FT MAIN DISP FOR AR-6400 AR-6475

## (undated) DEVICE — 3M™ STERI-DRAPE™ U-DRAPE 1067 1067 5/BX 4BX/CS/CTN&#X20;: Brand: STERI-DRAPE™

## (undated) DEVICE — SUTURE ETHLN SZ 3-0 L30IN NONABSORBABLE BLK FS-1 L24MM 3/8 669H

## (undated) DEVICE — 3M™ STERI-DRAPE™ U-DRAPE 1015: Brand: STERI-DRAPE™

## (undated) DEVICE — RESTRAINT POS UNIV HD NK ALIGN DISP FOR SHLDR PROC

## (undated) DEVICE — GLOVE ORANGE PI 8   MSG9080

## (undated) DEVICE — SYRINGE MED 30ML STD CLR PLAS LUERLOCK TIP N CTRL DISP

## (undated) DEVICE — SYRINGE, LUER LOCK, 5ML: Brand: MEDLINE

## (undated) DEVICE — DRAPE SHEET ULTRAGARD: Brand: MEDLINE

## (undated) DEVICE — SHEET,DRAPE,53X77,STERILE: Brand: MEDLINE

## (undated) DEVICE — NEEDLE HYPO 20GA L1.5IN YEL POLYPR HUB S STL REG BVL STR

## (undated) DEVICE — [AGGRESSIVE PLUS CUTTER, ARTHROSCOPIC SHAVER BLADE,  DO NOT RESTERILIZE,  DO NOT USE IF PACKAGE IS DAMAGED,  KEEP DRY,  KEEP AWAY FROM SUNLIGHT]: Brand: FORMULA

## (undated) DEVICE — GLOVE SURG SZ 7 L12IN FNGR THK79MIL GRN LTX FREE

## (undated) DEVICE — SYRINGE MED 10ML SLIP TIP BLNT FILL AND LUERLOCK DISP

## (undated) DEVICE — INTENDED FOR TISSUE SEPARATION, AND OTHER PROCEDURES THAT REQUIRE A SHARP SURGICAL BLADE TO PUNCTURE OR CUT.: Brand: BARD-PARKER ® STAINLESS STEEL BLADES

## (undated) DEVICE — COVER,MAYO STAND,STERILE: Brand: MEDLINE

## (undated) DEVICE — 3M™ MICROFOAM™ TAPE 1528-4: Brand: 3M™ MICROFOAM™

## (undated) DEVICE — SHOULDER PK

## (undated) DEVICE — GOWN,ECLIPSE,POLYRNF,BRTHSLV,XL,30/CS: Brand: MEDLINE

## (undated) DEVICE — Z INACTIVE USE 2660664 SOLUTION IRRIG 3000ML 0.9% SOD CHL USP UROMATIC PLAS CONT

## (undated) DEVICE — NEEDLE FLTR 19GA L1.5IN WALL THK5UM BRN POLYPR HUB S STL

## (undated) DEVICE — DRAPE ORTH 60X70IN POLY FLM ANCIL U RESIST FLAME TEARING

## (undated) DEVICE — SUTURE ABSORBABLE BRAIDED 2-0 CT-1 27 IN UD VICRYL J259H

## (undated) DEVICE — GLOVE SURG SZ 65 CRM LTX FREE POLYISOPRENE POLYMER BEAD ANTI

## (undated) DEVICE — KIT CHAIR TRIMANO FOAM W/ SUPP ARM DRP ERGONOMICALLY DESIGNED

## (undated) DEVICE — IMMOBILIZER SHLDR SWTH UNIV DEV BLK P.A.D. III

## (undated) DEVICE — PACK,BASIC,IX: Brand: MEDLINE

## (undated) DEVICE — YANKAUER,FLEXIBLE HANDLE,REGLR CAPACITY: Brand: MEDLINE INDUSTRIES, INC.

## (undated) DEVICE — GOWN,ECLIPSE,POLYRNF,BRTHSLV,L,30/CS: Brand: MEDLINE

## (undated) DEVICE — SOLUTION IV IRRIG WATER 1000ML POUR BRL 2F7114

## (undated) DEVICE — CHLORAPREP 26ML ORANGE

## (undated) DEVICE — 3M™ STERI-DRAPE™ INSTRUMENT POUCH 1018: Brand: STERI-DRAPE™